# Patient Record
Sex: MALE | Race: WHITE | Employment: OTHER | URBAN - NONMETROPOLITAN AREA
[De-identification: names, ages, dates, MRNs, and addresses within clinical notes are randomized per-mention and may not be internally consistent; named-entity substitution may affect disease eponyms.]

---

## 2017-10-30 ENCOUNTER — TELEPHONE (OUTPATIENT)
Dept: FAMILY MEDICINE | Facility: OTHER | Age: 62
End: 2017-10-30

## 2017-10-30 ENCOUNTER — OFFICE VISIT (OUTPATIENT)
Dept: FAMILY MEDICINE | Facility: OTHER | Age: 62
End: 2017-10-30
Attending: FAMILY MEDICINE
Payer: COMMERCIAL

## 2017-10-30 VITALS
HEART RATE: 72 BPM | WEIGHT: 170 LBS | TEMPERATURE: 96.9 F | BODY MASS INDEX: 26.68 KG/M2 | OXYGEN SATURATION: 98 % | HEIGHT: 67 IN | SYSTOLIC BLOOD PRESSURE: 132 MMHG | DIASTOLIC BLOOD PRESSURE: 80 MMHG

## 2017-10-30 DIAGNOSIS — R73.03 PRE-DIABETES: Primary | ICD-10-CM

## 2017-10-30 DIAGNOSIS — Z13.220 SCREENING FOR LIPOID DISORDERS: Primary | ICD-10-CM

## 2017-10-30 DIAGNOSIS — E78.2 MIXED HYPERLIPIDEMIA: ICD-10-CM

## 2017-10-30 DIAGNOSIS — B19.20 HEPATITIS C VIRUS INFECTION WITHOUT HEPATIC COMA, UNSPECIFIED CHRONICITY: ICD-10-CM

## 2017-10-30 DIAGNOSIS — I10 ESSENTIAL HYPERTENSION, BENIGN: ICD-10-CM

## 2017-10-30 LAB
ANION GAP SERPL CALCULATED.3IONS-SCNC: 4 MMOL/L (ref 3–14)
BUN SERPL-MCNC: 13 MG/DL (ref 7–30)
CALCIUM SERPL-MCNC: 9 MG/DL (ref 8.5–10.1)
CHLORIDE SERPL-SCNC: 100 MMOL/L (ref 94–109)
CHOLEST SERPL-MCNC: 199 MG/DL
CO2 SERPL-SCNC: 30 MMOL/L (ref 20–32)
CREAT SERPL-MCNC: 0.94 MG/DL (ref 0.66–1.25)
GFR SERPL CREATININE-BSD FRML MDRD: 81 ML/MIN/1.7M2
GLUCOSE SERPL-MCNC: 114 MG/DL (ref 70–99)
HDLC SERPL-MCNC: 51 MG/DL
LDLC SERPL CALC-MCNC: 131 MG/DL
NONHDLC SERPL-MCNC: 148 MG/DL
POTASSIUM SERPL-SCNC: 4.5 MMOL/L (ref 3.4–5.3)
SODIUM SERPL-SCNC: 134 MMOL/L (ref 133–144)
TRIGL SERPL-MCNC: 83 MG/DL

## 2017-10-30 PROCEDURE — 36415 COLL VENOUS BLD VENIPUNCTURE: CPT | Performed by: FAMILY MEDICINE

## 2017-10-30 PROCEDURE — 80048 BASIC METABOLIC PNL TOTAL CA: CPT | Performed by: FAMILY MEDICINE

## 2017-10-30 PROCEDURE — 99214 OFFICE O/P EST MOD 30 MIN: CPT | Performed by: FAMILY MEDICINE

## 2017-10-30 PROCEDURE — 80061 LIPID PANEL: CPT | Performed by: FAMILY MEDICINE

## 2017-10-30 RX ORDER — LISINOPRIL 10 MG/1
10 TABLET ORAL DAILY
Qty: 90 TABLET | Refills: 3 | Status: SHIPPED | OUTPATIENT
Start: 2017-10-30 | End: 2018-11-19

## 2017-10-30 RX ORDER — ATORVASTATIN CALCIUM 10 MG/1
10 TABLET, FILM COATED ORAL DAILY
Qty: 90 TABLET | Refills: 1 | Status: SHIPPED | OUTPATIENT
Start: 2017-10-30 | End: 2018-11-29

## 2017-10-30 ASSESSMENT — ANXIETY QUESTIONNAIRES
2. NOT BEING ABLE TO STOP OR CONTROL WORRYING: NOT AT ALL
1. FEELING NERVOUS, ANXIOUS, OR ON EDGE: NOT AT ALL
GAD7 TOTAL SCORE: 0
5. BEING SO RESTLESS THAT IT IS HARD TO SIT STILL: NOT AT ALL
4. TROUBLE RELAXING: NOT AT ALL
6. BECOMING EASILY ANNOYED OR IRRITABLE: NOT AT ALL
7. FEELING AFRAID AS IF SOMETHING AWFUL MIGHT HAPPEN: NOT AT ALL
3. WORRYING TOO MUCH ABOUT DIFFERENT THINGS: NOT AT ALL

## 2017-10-30 ASSESSMENT — PATIENT HEALTH QUESTIONNAIRE - PHQ9: SUM OF ALL RESPONSES TO PHQ QUESTIONS 1-9: 0

## 2017-10-30 ASSESSMENT — PAIN SCALES - GENERAL: PAINLEVEL: NO PAIN (0)

## 2017-10-30 NOTE — PROGRESS NOTES
SUBJECTIVE:   Gamaliel Johnson is a 62 year old male who presents to clinic today for the following health issues:      Hypertension Follow-up      Outpatient blood pressures are not being checked.    Low Salt Diet: low salt  Currently on lisinopril 10 mg daily- needs refill       Amount of exercise or physical activity: 6-7 days/week for an average of 45-60 minutes    Problems taking medications regularly: No    Medication side effects: none    Diet: low salt        Diagnosed with hepatitis C a few years ago.  He never did follow-through to see the gastroenterologist    Problem list and histories reviewed & adjusted, as indicated.  Additional history: as documented    Patient Active Problem List   Diagnosis     Chronic hepatitis C (H)     Hypertension     ACP (advance care planning)     Past Surgical History:   Procedure Laterality Date     COLONOSCOPY  1986     COLONOSCOPY N/A 2/19/2015    Procedure: COLONOSCOPY;  Surgeon: Efren Hernandez MD;  Location: [facility]     VASECTOMY Bilateral        Social History   Substance Use Topics     Smoking status: Never Smoker     Smokeless tobacco: Current User     Types: Chew     Alcohol use Yes      Comment: 4-5 beers max/case a week     Family History   Problem Relation Age of Onset     Hypertension Father      Hypertension Mother          Current Outpatient Prescriptions   Medication Sig Dispense Refill     lisinopril (PRINIVIL/ZESTRIL) 10 MG tablet Take 1 tablet (10 mg) by mouth daily 90 tablet 3     sildenafil (VIAGRA) 100 MG tablet Take 0.5-1 tablets ( mg) by mouth daily as needed for erectile dysfunction Take 30 min to 4 hours before intercourse.  Never use with nitroglycerin, terazosin or doxazosin. 12 tablet 11     [DISCONTINUED] lisinopril (PRINIVIL,ZESTRIL) 10 MG tablet TAKE 1 TABLET DAILY 90 tablet 2     Allergies   Allergen Reactions     Sulfa Drugs          Reviewed and updated as needed this visit by clinical staff     Reviewed and updated as needed this  "visit by Provider         ROS:  Constitutional, HEENT, cardiovascular, pulmonary, gi and gu systems are negative, except as otherwise noted.      OBJECTIVE:   /80  Pulse 72  Temp 96.9  F (36.1  C) (Tympanic)  Ht 5' 6.5\" (1.689 m)  Wt 170 lb (77.1 kg)  SpO2 98%  BMI 27.03 kg/m2  Body mass index is 27.03 kg/(m^2).  GENERAL: healthy, alert and no distress  NECK: no adenopathy, no asymmetry, masses, or scars and thyroid normal to palpation  RESP: lungs clear to auscultation - no rales, rhonchi or wheezes  CV: regular rate and rhythm, normal S1 S2, no S3 or S4, no murmur, click or rub, no peripheral edema and peripheral pulses strong  ABDOMEN: soft, nontender, no hepatosplenomegaly, no masses and bowel sounds normal  MS: no gross musculoskeletal defects noted, no edema    Diagnostic Test Results:pending  Exam Date Exam Time Accession # Results    11/28/14  8:56 AM [id number]    Component Results   Component Value Flag Ref Range Units Status Collected Lab   HCV RNA Quant IU/ml [id number] (H) 0 - 12 IU/mL Final 11/28/2014  8:56 AM 75   Log of HCV RNA Qt 5.4 (H) 0 - 1.1 Log IU/mL Final 11/28/2014  8:56 AM 75   Comment:   Assay methodology is polymerase chain reaction (PCR) using FDA approved Abbott    RealTime HCV test.    The International Unit (IU) is a designated unit value assigned to the    \"International Standard for Nucleic Acid Amplification Technology Assays for    HCV RNA\" which is accepted by the WHO Expert Committee on Biological    Standardization       ASSESSMENT/PLAN:               ICD-10-CM    1. Screening for lipoid disorders Z13.220 Lipid Profile done will call with results   2. Essential hypertension, benign I10 Basic metabolic panel done. BP is controlled     lisinopril (PRINIVIL/ZESTRIL) 10 MG tablet   3. Hepatitis C virus infection without hepatic coma, unspecified chronicity B19.20 GASTROENTEROLOGY ADULT REF CONSULT ONLY. Patient had abnormal hepatitis C lab tests in 2014.  He never " followed through to see the gastroenterologist.  He is now willing to see them.  Will make another referral to be treated for hepatitis C.  He did not want  Current labs on this now.           NEIL Valverde MD  [facility]

## 2017-10-30 NOTE — TELEPHONE ENCOUNTER
Patient notified of lab results, declined diabetic ed, will recheck labs in 3 months and start medication  DANNY JORDAN

## 2017-10-30 NOTE — MR AVS SNAPSHOT
After Visit Summary   10/30/2017    Gamaliel Johnson    MRN: [id number]           Patient Information     Date Of Birth          1955        Visit Information        Provider Department      10/30/2017 10:30 AM NEIL Valverde MD [facility]        Today's Diagnoses     Screening for lipoid disorders    -  1    Essential hypertension, benign        Hepatitis C virus infection without hepatic coma, unspecified chronicity        Essential hypertension with goal blood pressure less than 130/85          Care Instructions    We will call with the labs.            Follow-ups after your visit        Additional Services     GASTROENTEROLOGY ADULT REF CONSULT ONLY       Preferred Location: [facility]      Please be aware that coverage of these services is subject to the terms and limitations of your health insurance plan.  Call member services at your health plan with any benefit or coverage questions.  Any procedures must be performed at a [facility] facility OR coordinated by your clinic's referral office.    Please bring the following with you to your appointment:    (1) Any X-Rays, CTs or MRIs which have been performed.  Contact the facility where they were done to arrange for  prior to your scheduled appointment.    (2) List of current medications   (3) This referral request   (4) Any documents/labs given to you for this referral                  Who to contact     If you have questions or need follow up information about today's clinic visit or your schedule please contact [facility] directly at [phone].  Normal or non-critical lab and imaging results will be communicated to you by MyChart, letter or phone within 4 business days after the clinic has received the results. If you do not hear from us within 7 days, please contact the clinic through MyChart or phone. If you have a critical or abnormal lab result, we will notify you by phone as soon as  "possible.  Submit refill requests through [employer] or call your pharmacy and they will forward the refill request to us. Please allow 3 business days for your refill to be completed.          Additional Information About Your Visit        [employer] Information     The [employer] lets you send messages to your doctor, view your test results, renew your prescriptions, schedule appointments and more. To sign up, go to [email or web address] . Click on \"Log in\" on the left side of the screen, which will take you to the Welcome page. Then click on \"Sign up Now\" on the right side of the page.     You will be asked to enter the access code listed below, as well as some personal information. Please follow the directions to create your username and password.     Your access code is: [email or web address]  Expires: 2018 10:42 AM     Your access code will  in 90 days. If you need help or a new code, please call your [facility] clinic or [phone].        Care EveryWhere ID     This is your Care EveryWhere ID. This could be used by other organizations to access your [facility] medical records  [id number]        Your Vitals Were     Pulse Temperature Height Pulse Oximetry BMI (Body Mass Index)       72 96.9  F (36.1  C) (Tympanic) 5' 6.5\" (1.689 m) 98% 27.03 kg/m2        Blood Pressure from Last 3 Encounters:   10/30/17 132/80   16 130/81   03/16/15 132/80    Weight from Last 3 Encounters:   10/30/17 170 lb (77.1 kg)   16 167 lb (75.8 kg)   03/16/15 170 lb (77.1 kg)              We Performed the Following     Basic metabolic panel     GASTROENTEROLOGY ADULT REF CONSULT ONLY     Lipid Profile          Today's Medication Changes          These changes are accurate as of: 10/30/17 10:42 AM.  If you have any questions, ask your nurse or doctor.               These medicines have changed or have updated prescriptions.        Dose/Directions    lisinopril 10 MG tablet   Commonly known as:  PRINIVIL/ZESTRIL   This may have " changed:  See the new instructions.   Used for:  Essential hypertension with goal blood pressure less than 130/85   Changed by:  NEIL Valverde MD        Dose:  10 mg   Take 1 tablet (10 mg) by mouth daily   Quantity:  90 tablet   Refills:  3            Where to get your medicines      These medications were sent to [facility] - [place], [place] - [address]  [address] [place] [address]     Phone:  [phone]     lisinopril 10 MG tablet                Primary Care Provider    None Specified       No primary provider on file.        Equal Access to Services     [facility]: Hadii aad ku hadasho Soomaali, waaxda luqadaha, qaybta kaalmada adeegyada, waxay audrey fuentes . So [facility] [phone].    ATENCIÓN: Si habla español, tiene a watters disposición servicios gratuitos de asistencia lingüística. Ll[facility] [phone].    We comply with applicable federal civil rights laws and [place] laws. We do not discriminate on the basis of race, color, national origin, age, disability, sex, sexual orientation, or gender identity.            Thank you!     Thank you for choosing [facility]  for your care. Our goal is always to provide you with excellent care. Hearing back from our patients is one way we can continue to improve our services. Please take a few minutes to complete the written survey that you may receive in the mail after your visit with us. Thank you!             Your Updated Medication List - Protect others around you: Learn how to safely use, store and throw away your medicines at www.disposemymeds.org.          This list is accurate as of: 10/30/17 10:42 AM.  Always use your most recent med list.                   Brand Name Dispense Instructions for use Diagnosis    lisinopril 10 MG tablet    PRINIVIL/ZESTRIL    90 tablet    Take 1 tablet (10 mg) by mouth daily    Essential hypertension with goal blood pressure less than 130/85       sildenafil 100 MG  tablet    VIAGRA    12 tablet    Take 0.5-1 tablets ( mg) by mouth daily as needed for erectile dysfunction Take 30 min to 4 hours before intercourse.  Never use with nitroglycerin, terazosin or doxazosin.    Other male erectile dysfunction

## 2017-10-30 NOTE — NURSING NOTE
"Chief Complaint   Patient presents with     [facility]     Last PCP- Venice Antony      Hypertension       Initial BP (!) 148/92 (BP Location: Left arm, Patient Position: Chair, Cuff Size: Adult Large)  Pulse 72  Temp 96.9  F (36.1  C) (Tympanic)  Ht 5' 6.5\" (1.689 m)  Wt 170 lb (77.1 kg)  SpO2 98%  BMI 27.03 kg/m2 Estimated body mass index is 27.03 kg/(m^2) as calculated from the following:    Height as of this encounter: 5' 6.5\" (1.689 m).    Weight as of this encounter: 170 lb (77.1 kg).  Medication Reconciliation: complete     DANNY JORDAN      "

## 2017-10-31 ASSESSMENT — ANXIETY QUESTIONNAIRES: GAD7 TOTAL SCORE: 0

## 2017-12-12 ENCOUNTER — TRANSFERRED RECORDS (OUTPATIENT)
Dept: HEALTH INFORMATION MANAGEMENT | Facility: HOSPITAL | Age: 62
End: 2017-12-12

## 2017-12-20 ENCOUNTER — TRANSFERRED RECORDS (OUTPATIENT)
Dept: HEALTH INFORMATION MANAGEMENT | Facility: HOSPITAL | Age: 62
End: 2017-12-20

## 2018-01-05 ENCOUNTER — TRANSFERRED RECORDS (OUTPATIENT)
Dept: HEALTH INFORMATION MANAGEMENT | Facility: HOSPITAL | Age: 63
End: 2018-01-05

## 2018-01-06 ENCOUNTER — TRANSFERRED RECORDS (OUTPATIENT)
Dept: HEALTH INFORMATION MANAGEMENT | Facility: HOSPITAL | Age: 63
End: 2018-01-06

## 2018-01-10 ENCOUNTER — TRANSFERRED RECORDS (OUTPATIENT)
Dept: HEALTH INFORMATION MANAGEMENT | Facility: HOSPITAL | Age: 63
End: 2018-01-10

## 2018-01-10 LAB — HBA1C MFR BLD: 5.6 % (ref 4–6)

## 2018-01-15 ENCOUNTER — TRANSFERRED RECORDS (OUTPATIENT)
Dept: HEALTH INFORMATION MANAGEMENT | Facility: HOSPITAL | Age: 63
End: 2018-01-15

## 2018-01-18 ENCOUNTER — TRANSFERRED RECORDS (OUTPATIENT)
Dept: HEALTH INFORMATION MANAGEMENT | Facility: HOSPITAL | Age: 63
End: 2018-01-18

## 2018-03-20 ENCOUNTER — TRANSFERRED RECORDS (OUTPATIENT)
Dept: HEALTH INFORMATION MANAGEMENT | Facility: CLINIC | Age: 63
End: 2018-03-20

## 2018-04-16 ENCOUNTER — TRANSFERRED RECORDS (OUTPATIENT)
Dept: HEALTH INFORMATION MANAGEMENT | Facility: CLINIC | Age: 63
End: 2018-04-16

## 2018-05-25 ENCOUNTER — TRANSFERRED RECORDS (OUTPATIENT)
Dept: HEALTH INFORMATION MANAGEMENT | Facility: CLINIC | Age: 63
End: 2018-05-25

## 2018-08-02 ENCOUNTER — TRANSFERRED RECORDS (OUTPATIENT)
Dept: HEALTH INFORMATION MANAGEMENT | Facility: CLINIC | Age: 63
End: 2018-08-02

## 2018-08-09 ENCOUNTER — TRANSFERRED RECORDS (OUTPATIENT)
Dept: HEALTH INFORMATION MANAGEMENT | Facility: CLINIC | Age: 63
End: 2018-08-09

## 2018-11-19 DIAGNOSIS — I10 ESSENTIAL HYPERTENSION, BENIGN: ICD-10-CM

## 2018-11-19 NOTE — TELEPHONE ENCOUNTER
lisinopril (PRINIVIL/ZESTRIL) 10 MG tablet    Last Written Prescription Date:  10/30/17  Last Fill Quantity: 90,   # refills: 3  Last Office Visit: 10/30/17  Future Office visit:    Next 5 appointments (look out 90 days)     Nov 29, 2018  9:00 AM CST   (Arrive by 8:45 AM)   SHORT with NEIL Valverde MD   [facility] ([facility]    [address] [place] [address]   [phone]

## 2018-11-20 RX ORDER — LISINOPRIL 10 MG/1
10 TABLET ORAL DAILY
Qty: 90 TABLET | Refills: 0 | Status: SHIPPED | OUTPATIENT
Start: 2018-11-20 | End: 2019-02-18

## 2018-11-27 NOTE — PROGRESS NOTES
SUBJECTIVE:   Gamaliel Johnson is a 63 year old male who presents to clinic today for the following health issues:      Hyperlipidemia Follow-Up      Rate your low fat/cholesterol diet?: not monitoring fat    Taking statin?  No taking Lipitor     Other lipid medications/supplements?:  none    Hypertension Follow-up      Outpatient blood pressures are not being checked.    Low Salt Diet: low salt      Musculoskeletal problem/pain      Duration: almost 1 year     Description  Location: right hip     Intensity:  mild, moderate    Accompanying signs and symptoms: symptoms occur with physical activity     History  Previous similar problem: no   Previous evaluation:  none    Precipitating or alleviating factors:  Trauma or overuse: no   Aggravating factors include: shoveling     Therapies tried and outcome: rest     WART(S)      Onset: 6 months     Description (location/number): right hand middle finger     Accompanying signs and symptoms: Painful: YES    History: prior warts: YES    Therapies tried and outcome: otc wart removal       [facility]    Gamaliel Johnson, 63 year old, male presents with   Chief Complaint   Patient presents with     Lipids     Hypertension     Musculoskeletal Problem     Derm Problem       PAST MEDICAL HISTORY:  Past Medical History:   Diagnosis Date     Compensated HCV cirrhosis (H)      Hepatitis C        PAST SURGICAL HISTORY:  Past Surgical History:   Procedure Laterality Date     COLONOSCOPY  1986     COLONOSCOPY N/A 2/19/2015    Procedure: COLONOSCOPY;  Surgeon: Efren Hernandez MD;  Location: [facility]     VASECTOMY Bilateral        MEDICATIONS:  Prior to Admission medications    Medication Sig Start Date End Date Taking? Authorizing Provider   lisinopril (PRINIVIL/ZESTRIL) 10 MG tablet Take 1 tablet (10 mg) by mouth daily 11/20/18  Yes NEIL Valvrede MD   sildenafil (VIAGRA) 100 MG tablet Take 0.5-1 tablets ( mg) by mouth daily as needed for erectile dysfunction  Take 30 min to 4 hours before intercourse.  Never use with nitroglycerin, terazosin or doxazosin. 10/28/16  Yes Venice Antony NP       ALLERGIES:     Allergies   Allergen Reactions     Sulfa Drugs        ROS:  Constitutional, neuro, ENT, endocrine, pulmonary, cardiac, gastrointestinal, genitourinary, musculoskeletal, integument and psychiatric systems are negative, except as otherwise noted.      EXAM:  /78  Pulse 82  Temp 97  F (36.1  C) (Tympanic)  Wt 185 lb (83.9 kg)  SpO2 98%  BMI 29.41 kg/m2 Body mass index is 29.41 kg/(m^2).   GENERAL APPEARANCE: healthy, alert and no distress  EYES: Eyes grossly normal to inspection, PERRL and conjunctivae and sclerae normal  NECK: no adenopathy, no asymmetry, masses, or scars and thyroid normal to palpation  RESP: lungs clear to auscultation - no rales, rhonchi or wheezes  CV: regular rates and rhythm, normal S1 S2, no S3 or S4 and no murmur, click or rub  ABDOMEN: soft, nontender, without hepatosplenomegaly or masses and bowel sounds normal  MS: extremities normal- no gross deformities noted, has little tenderness left paraspinal lumbar region  NEURO: Normal strength and tone, mentation intact and speech normal  PSYCH: mentation appears normal and affect normal/bright  Lab/ X-ray  No results found for this or any previous visit (from the past 24 hour(s)).    ASSESSMENT/PLAN:    ICD-10-CM    1. Hypercholesterolemia E78.00 Lipid Profile   2. Essential hypertension, benign I10 Basic metabolic panel   3. Common wart B07.8    4. Chronic hepatitis C without hepatic coma (H) B18.2    5. Back strain, initial encounter S39.012A    We will check fasting lipids for hypercholesterolemia and a BMP for his hypertension blood pressure is controlled on lisinopril.  Has a common wart right hand middle digit PIP joint laterally discussed cryotherapy he consents cryotherapy x2 tolerated the procedure well.  Is followed by gastroenterology for his chronic hepatitis C will be having  quantitative hepatitis C test in a couple weeks.  For his back he notices it when he goes out and shovels.  I told him before he travels he should stretch and warm up because there is a lot of bending twisting and lifting.  He will try these measures first and if that does not help then he will come in for evaluation x-ray and possible formal physical therapy.      FORREST Valverde MD  November 29, 2018

## 2018-11-29 ENCOUNTER — TELEPHONE (OUTPATIENT)
Dept: FAMILY MEDICINE | Facility: OTHER | Age: 63
End: 2018-11-29

## 2018-11-29 ENCOUNTER — OFFICE VISIT (OUTPATIENT)
Dept: FAMILY MEDICINE | Facility: OTHER | Age: 63
End: 2018-11-29
Attending: FAMILY MEDICINE
Payer: COMMERCIAL

## 2018-11-29 VITALS
SYSTOLIC BLOOD PRESSURE: 138 MMHG | WEIGHT: 185 LBS | TEMPERATURE: 97 F | BODY MASS INDEX: 29.41 KG/M2 | DIASTOLIC BLOOD PRESSURE: 78 MMHG | HEART RATE: 82 BPM | OXYGEN SATURATION: 98 %

## 2018-11-29 DIAGNOSIS — S39.012A BACK STRAIN, INITIAL ENCOUNTER: ICD-10-CM

## 2018-11-29 DIAGNOSIS — B18.2 CHRONIC HEPATITIS C WITHOUT HEPATIC COMA (H): ICD-10-CM

## 2018-11-29 DIAGNOSIS — I10 ESSENTIAL HYPERTENSION, BENIGN: ICD-10-CM

## 2018-11-29 DIAGNOSIS — E78.00 HYPERCHOLESTEROLEMIA: Primary | ICD-10-CM

## 2018-11-29 DIAGNOSIS — E78.2 MIXED HYPERLIPIDEMIA: Primary | ICD-10-CM

## 2018-11-29 DIAGNOSIS — B07.8 COMMON WART: ICD-10-CM

## 2018-11-29 LAB
ANION GAP SERPL CALCULATED.3IONS-SCNC: 4 MMOL/L (ref 3–14)
BUN SERPL-MCNC: 19 MG/DL (ref 7–30)
CALCIUM SERPL-MCNC: 8.7 MG/DL (ref 8.5–10.1)
CHLORIDE SERPL-SCNC: 103 MMOL/L (ref 94–109)
CHOLEST SERPL-MCNC: 183 MG/DL
CO2 SERPL-SCNC: 26 MMOL/L (ref 20–32)
CREAT SERPL-MCNC: 1 MG/DL (ref 0.66–1.25)
GFR SERPL CREATININE-BSD FRML MDRD: 75 ML/MIN/1.7M2
GLUCOSE SERPL-MCNC: 106 MG/DL (ref 70–99)
HDLC SERPL-MCNC: 51 MG/DL
LDLC SERPL CALC-MCNC: 117 MG/DL
NONHDLC SERPL-MCNC: 132 MG/DL
POTASSIUM SERPL-SCNC: 4.6 MMOL/L (ref 3.4–5.3)
SODIUM SERPL-SCNC: 133 MMOL/L (ref 133–144)
TRIGL SERPL-MCNC: 76 MG/DL

## 2018-11-29 PROCEDURE — 36415 COLL VENOUS BLD VENIPUNCTURE: CPT | Performed by: FAMILY MEDICINE

## 2018-11-29 PROCEDURE — 80061 LIPID PANEL: CPT | Performed by: FAMILY MEDICINE

## 2018-11-29 PROCEDURE — 17110 DESTRUCTION B9 LES UP TO 14: CPT | Performed by: FAMILY MEDICINE

## 2018-11-29 PROCEDURE — 99212 OFFICE O/P EST SF 10 MIN: CPT | Mod: 25 | Performed by: FAMILY MEDICINE

## 2018-11-29 PROCEDURE — 80048 BASIC METABOLIC PNL TOTAL CA: CPT | Performed by: FAMILY MEDICINE

## 2018-11-29 RX ORDER — ATORVASTATIN CALCIUM 10 MG/1
10 TABLET, FILM COATED ORAL DAILY
Qty: 90 TABLET | Refills: 1 | COMMUNITY
Start: 2018-11-29 | End: 2020-02-03

## 2018-11-29 ASSESSMENT — PAIN SCALES - GENERAL: PAINLEVEL: NO PAIN (0)

## 2018-11-29 NOTE — NURSING NOTE
"Chief Complaint   Patient presents with     Lipids     Hypertension     Musculoskeletal Problem     Derm Problem       Initial BP (!) 150/94 (BP Location: Left arm, Patient Position: Chair, Cuff Size: Adult Large)  Pulse 82  Temp 97  F (36.1  C) (Tympanic)  Wt 185 lb (83.9 kg)  SpO2 98%  BMI 29.41 kg/m2 Estimated body mass index is 29.41 kg/(m^2) as calculated from the following:    Height as of 10/30/17: 5' 6.5\" (1.689 m).    Weight as of this encounter: 185 lb (83.9 kg).  Medication Reconciliation: complete    DANNY JORDAN LPN  "

## 2018-11-29 NOTE — LETTER
[facility]  [address]  [place] [address]  [phone]        Sylvia 3, 2019    Gamaliel Johnson  [address] [place] [address]              Dear Gamaliel Johnson    This is to remind you that your fasting lab is due.    You may call our office at [phone] to schedule an appointment.    Please disregard this notice if you have already had your labs drawn or made an appointment.        Sincerely,        NEIL Valverde MD

## 2018-11-29 NOTE — MR AVS SNAPSHOT
After Visit Summary   11/29/2018    Gamaliel Johnson    MRN: [id number]           Patient Information     Date Of Birth          1955        Visit Information        Provider Department      11/29/2018 9:00 AM NEIL Valverde MD [facility]        Today's Diagnoses     Hypercholesterolemia    -  1    Essential hypertension, benign        Common wart           Follow-ups after your visit        Who to contact     If you have questions or need follow up information about today's clinic visit or your schedule please contact [facility] directly at [phone].  Normal or non-critical lab and imaging results will be communicated to you by MyChart, letter or phone within 4 business days after the clinic has received the results. If you do not hear from us within 7 days, please contact the clinic through MyChart or phone. If you have a critical or abnormal lab result, we will notify you by phone as soon as possible.  Submit refill requests through [employer] or call your pharmacy and they will forward the refill request to us. Please allow 3 business days for your refill to be completed.          Additional Information About Your Visit        Care EveryWhere ID     This is your Care EveryWhere ID. This could be used by other organizations to access your [facility] medical records  [id number]        Your Vitals Were     Pulse Temperature Pulse Oximetry BMI (Body Mass Index)          82 97  F (36.1  C) (Tympanic) 98% 29.41 kg/m2         Blood Pressure from Last 3 Encounters:   11/29/18 138/78   10/30/17 132/80   07/27/16 130/81    Weight from Last 3 Encounters:   11/29/18 185 lb (83.9 kg)   10/30/17 170 lb (77.1 kg)   07/27/16 167 lb (75.8 kg)              We Performed the Following     Basic metabolic panel     Lipid Profile          Today's Medication Changes          These changes are accurate as of 11/29/18  9:12 AM.  If you have any questions, ask your nurse  or doctor.               Stop taking these medicines if you haven't already. Please contact your care team if you have questions.     atorvastatin 10 MG tablet   Commonly known as:  LIPITOR   Stopped by:  NEIL Valverde MD                    Primary Care Provider Office Phone # Fax #    NEIL Valverde [phone] [address] [address]        Equal Access to Services     [facility]: Hadii aad ku hadasho Soomaali, waaxda luqadaha, qaybta kaalmada adeegyada, waxay idiin hayaan adeeg kharash la'aan . So [facility] [phone].    ATENCIÓN: Si habla español, tiene a watters disposición servicios gratuitos de asistencia lingüística. Llame al [phone].    We comply with applicable federal civil rights laws and [place] laws. We do not discriminate on the basis of race, color, national origin, age, disability, sex, sexual orientation, or gender identity.            Thank you!     Thank you for choosing [facility]  for your care. Our goal is always to provide you with excellent care. Hearing back from our patients is one way we can continue to improve our services. Please take a few minutes to complete the written survey that you may receive in the mail after your visit with us. Thank you!             Your Updated Medication List - Protect others around you: Learn how to safely use, store and throw away your medicines at www.disposemymeds.org.          This list is accurate as of 11/29/18  9:12 AM.  Always use your most recent med list.                   Brand Name Dispense Instructions for use Diagnosis    lisinopril 10 MG tablet    PRINIVIL/ZESTRIL    90 tablet    Take 1 tablet (10 mg) by mouth daily    Essential hypertension, benign       sildenafil 100 MG tablet    VIAGRA    12 tablet    Take 0.5-1 tablets ( mg) by mouth daily as needed for erectile dysfunction Take 30 min to 4 hours before intercourse.  Never use with nitroglycerin, terazosin or  doxazosin.    Other male erectile dysfunction

## 2018-12-03 ENCOUNTER — TRANSFERRED RECORDS (OUTPATIENT)
Dept: HEALTH INFORMATION MANAGEMENT | Facility: HOSPITAL | Age: 63
End: 2018-12-03
Payer: COMMERCIAL

## 2018-12-03 LAB — HEP C HIM: NORMAL

## 2018-12-06 ENCOUNTER — TRANSFERRED RECORDS (OUTPATIENT)
Dept: HEALTH INFORMATION MANAGEMENT | Facility: CLINIC | Age: 63
End: 2018-12-06

## 2019-02-18 DIAGNOSIS — I10 ESSENTIAL HYPERTENSION, BENIGN: ICD-10-CM

## 2019-02-18 RX ORDER — LISINOPRIL 10 MG/1
TABLET ORAL
Qty: 90 TABLET | Refills: 1 | Status: SHIPPED | OUTPATIENT
Start: 2019-02-18 | End: 2019-10-02

## 2019-10-02 DIAGNOSIS — I10 ESSENTIAL HYPERTENSION, BENIGN: ICD-10-CM

## 2019-10-02 NOTE — TELEPHONE ENCOUNTER
lisinopril (PRINIVIL/ZESTRIL) 10 MG tablet  Last Written Prescription Date:  2/118/19  Last Fill Quantity: 90,   # refills: 1  Last Office Visit: 11/29/18  Future Office visit:

## 2019-10-03 RX ORDER — LISINOPRIL 10 MG/1
TABLET ORAL
Qty: 90 TABLET | Refills: 0 | Status: SHIPPED | OUTPATIENT
Start: 2019-10-03 | End: 2020-01-27

## 2020-01-27 DIAGNOSIS — I10 ESSENTIAL HYPERTENSION, BENIGN: ICD-10-CM

## 2020-01-27 RX ORDER — LISINOPRIL 10 MG/1
10 TABLET ORAL DAILY
Qty: 30 TABLET | Refills: 0 | Status: SHIPPED | OUTPATIENT
Start: 2020-01-27 | End: 2020-02-03

## 2020-01-29 NOTE — PROGRESS NOTES
Subjective     Gamaliel Johnson is a 64 year old male who presents to clinic today for the following health issues:    HPI   Hyperlipidemia Follow-Up      Are you regularly taking any medication or supplement to lower your cholesterol?   No    Are you having muscle aches or other side effects that you think could be caused by your cholesterol lowering medication?  No    Hypertension Follow-up      Do you check your blood pressure regularly outside of the clinic? No     Are you following a low salt diet? No    Are your blood pressures ever more than 140 on the top number (systolic) OR more   than 90 on the bottom number (diastolic), for example 140/90? Yes      How many servings of fruits and vegetables do you eat daily?  4 or more    On average, how many sweetened beverages do you drink each day (Examples: soda, juice, sweet tea, etc.  Do NOT count diet or artificially sweetened beverages)?   0    How many days per week do you exercise enough to make your heart beat faster? 5    How many minutes a day do you exercise enough to make your heart beat faster? 30 - 60  How many days per week do you miss taking your medication? 1    What makes it hard for you to take your medications?  remembering to take    [facility]    Gamaliel Johnson, 64 year old, male presents with   Chief Complaint   Patient presents with     Lipids     Hypertension       PAST MEDICAL HISTORY:  Past Medical History:   Diagnosis Date     Compensated HCV cirrhosis (H)      Hepatitis C        PAST SURGICAL HISTORY:  Past Surgical History:   Procedure Laterality Date     COLONOSCOPY  1986     COLONOSCOPY N/A 2/19/2015    Procedure: COLONOSCOPY;  Surgeon: Efren Hernandez MD;  Location: [facility]     VASECTOMY Bilateral        MEDICATIONS:  Prior to Admission medications    Medication Sig Start Date End Date Taking? Authorizing Provider   lisinopril (PRINIVIL/ZESTRIL) 10 MG tablet Take 1 tablet (10 mg) by mouth daily 2/3/20  Yes Abram  "NEIL Sargent MD   sildenafil (VIAGRA) 100 MG tablet Take 0.5-1 tablets ( mg) by mouth daily as needed for erectile dysfunction Take 30 min to 4 hours before intercourse.  Never use with nitroglycerin, terazosin or doxazosin. 10/28/16  Yes Venice Antony NP       ALLERGIES:     Allergies   Allergen Reactions     Sulfa Drugs        ROS:  Constitutional, HEENT, cardiovascular, pulmonary, gi and gu systems are negative, except as otherwise noted.      EXAM:  /74   Pulse 73   Temp 97.4  F (36.3  C) (Tympanic)   Ht 1.702 m (5' 7\")   Wt 81.2 kg (179 lb)   SpO2 97%   BMI 28.04 kg/m   Body mass index is 28.04 kg/m .   GENERAL APPEARANCE: healthy, alert and no distress  EYES: Eyes grossly normal to inspection, PERRL and conjunctivae and sclerae normal  NECK: no adenopathy, no asymmetry, masses, or scars and thyroid normal to palpation  RESP: lungs clear to auscultation - no rales, rhonchi or wheezes  CV: regular rates and rhythm, normal S1 S2, no S3 or S4 and no murmur, click or rub  NEURO: Normal strength and tone, mentation intact and speech normal  Lab/ X-ray  pending    ASSESSMENT/PLAN:    ICD-10-CM    1. Hypercholesterolemia E78.00 Lipid Profile   2. Essential hypertension, benign I10 lisinopril (PRINIVIL/ZESTRIL) 10 MG tablet     Basic metabolic panel   3. Screening for prostate cancer Z12.5 Prostate spec antigen screen     He did have a little coffee with little cream but otherwise is fasting we will check lipids follow-up hypercholesterolemia and follow-up hypertension blood pressures controlled lisinopril renewed.  Check a BMP today.  Also is going to check a PSA.  He is up-to-date with colonoscopy.  Previous knee and hip problems seem to be improved with more physical activities.    FORREST Valverde MD  February 3, 2020              "

## 2020-02-03 ENCOUNTER — OFFICE VISIT (OUTPATIENT)
Dept: FAMILY MEDICINE | Facility: OTHER | Age: 65
End: 2020-02-03
Attending: FAMILY MEDICINE
Payer: COMMERCIAL

## 2020-02-03 VITALS
SYSTOLIC BLOOD PRESSURE: 132 MMHG | HEART RATE: 73 BPM | BODY MASS INDEX: 28.09 KG/M2 | WEIGHT: 179 LBS | TEMPERATURE: 97.4 F | DIASTOLIC BLOOD PRESSURE: 74 MMHG | OXYGEN SATURATION: 97 % | HEIGHT: 67 IN

## 2020-02-03 DIAGNOSIS — I10 ESSENTIAL HYPERTENSION, BENIGN: ICD-10-CM

## 2020-02-03 DIAGNOSIS — E78.00 HYPERCHOLESTEROLEMIA: Primary | ICD-10-CM

## 2020-02-03 DIAGNOSIS — Z12.5 SCREENING FOR PROSTATE CANCER: ICD-10-CM

## 2020-02-03 LAB
ANION GAP SERPL CALCULATED.3IONS-SCNC: 5 MMOL/L (ref 3–14)
BUN SERPL-MCNC: 13 MG/DL (ref 7–30)
CALCIUM SERPL-MCNC: 8.8 MG/DL (ref 8.5–10.1)
CHLORIDE SERPL-SCNC: 104 MMOL/L (ref 94–109)
CHOLEST SERPL-MCNC: 176 MG/DL
CO2 SERPL-SCNC: 26 MMOL/L (ref 20–32)
CREAT SERPL-MCNC: 0.98 MG/DL (ref 0.66–1.25)
GFR SERPL CREATININE-BSD FRML MDRD: 81 ML/MIN/{1.73_M2}
GLUCOSE SERPL-MCNC: 108 MG/DL (ref 70–99)
HDLC SERPL-MCNC: 57 MG/DL
LDLC SERPL CALC-MCNC: 105 MG/DL
NONHDLC SERPL-MCNC: 119 MG/DL
POTASSIUM SERPL-SCNC: 4.1 MMOL/L (ref 3.4–5.3)
PSA SERPL-ACNC: 0.39 UG/L (ref 0–4)
SODIUM SERPL-SCNC: 135 MMOL/L (ref 133–144)
TRIGL SERPL-MCNC: 71 MG/DL

## 2020-02-03 PROCEDURE — 99213 OFFICE O/P EST LOW 20 MIN: CPT | Performed by: FAMILY MEDICINE

## 2020-02-03 PROCEDURE — G0103 PSA SCREENING: HCPCS | Mod: ZL | Performed by: FAMILY MEDICINE

## 2020-02-03 PROCEDURE — 80048 BASIC METABOLIC PNL TOTAL CA: CPT | Mod: ZL | Performed by: FAMILY MEDICINE

## 2020-02-03 PROCEDURE — 36415 COLL VENOUS BLD VENIPUNCTURE: CPT | Mod: ZL | Performed by: FAMILY MEDICINE

## 2020-02-03 PROCEDURE — G0463 HOSPITAL OUTPT CLINIC VISIT: HCPCS

## 2020-02-03 PROCEDURE — 80061 LIPID PANEL: CPT | Mod: ZL | Performed by: FAMILY MEDICINE

## 2020-02-03 RX ORDER — LISINOPRIL 10 MG/1
10 TABLET ORAL DAILY
Qty: 90 TABLET | Refills: 3 | Status: SHIPPED | OUTPATIENT
Start: 2020-02-03 | End: 2021-04-09

## 2020-02-03 ASSESSMENT — ANXIETY QUESTIONNAIRES
4. TROUBLE RELAXING: NOT AT ALL
2. NOT BEING ABLE TO STOP OR CONTROL WORRYING: NOT AT ALL
6. BECOMING EASILY ANNOYED OR IRRITABLE: NOT AT ALL
1. FEELING NERVOUS, ANXIOUS, OR ON EDGE: NOT AT ALL
7. FEELING AFRAID AS IF SOMETHING AWFUL MIGHT HAPPEN: NOT AT ALL
IF YOU CHECKED OFF ANY PROBLEMS ON THIS QUESTIONNAIRE, HOW DIFFICULT HAVE THESE PROBLEMS MADE IT FOR YOU TO DO YOUR WORK, TAKE CARE OF THINGS AT HOME, OR GET ALONG WITH OTHER PEOPLE: NOT DIFFICULT AT ALL
5. BEING SO RESTLESS THAT IT IS HARD TO SIT STILL: NOT AT ALL
3. WORRYING TOO MUCH ABOUT DIFFERENT THINGS: NOT AT ALL
GAD7 TOTAL SCORE: 0

## 2020-02-03 ASSESSMENT — PATIENT HEALTH QUESTIONNAIRE - PHQ9: SUM OF ALL RESPONSES TO PHQ QUESTIONS 1-9: 0

## 2020-02-03 ASSESSMENT — MIFFLIN-ST. JEOR: SCORE: 1555.57

## 2020-02-03 ASSESSMENT — PAIN SCALES - GENERAL: PAINLEVEL: NO PAIN (0)

## 2020-02-03 NOTE — NURSING NOTE
"Chief Complaint   Patient presents with     Lipids     Hypertension       Initial /74   Pulse 73   Temp 97.4  F (36.3  C) (Tympanic)   Ht 1.702 m (5' 7\")   Wt 81.2 kg (179 lb)   SpO2 97%   BMI 28.04 kg/m   Estimated body mass index is 28.04 kg/m  as calculated from the following:    Height as of this encounter: 1.702 m (5' 7\").    Weight as of this encounter: 81.2 kg (179 lb).  Medication Reconciliation: complete  Chitra Lehman LPN  "

## 2020-02-03 NOTE — LETTER
February 3, 2020      Gamaliel Johnson  [address] [place] [address]        Dear ,    We are writing to inform you of your test results. -PSA is good     The 10-year ASCVD risk score (Javi GOODSON Jr., et al., 2013) is: 13.2%.  Ten-year heart risk is slightly elevated plus fasting glucose is a little up.  Would recommend restarting Lipitor 10 mg a day to prevent heart attack.  We can check fasting lipids SGOT in 3-month     Values used to calculate the score:       Age: 64 years       Sex: Male       Is Non- : No       Diabetic: No       Tobacco smoker: No       Systolic Blood Pressure: 132 mmHg       Is BP treated: Yes       HDL Cholesterol: 57 mg/dL       Total Cholesterol: 176 mg/dL   Resulted Orders   Lipid Profile   Result Value Ref Range    Cholesterol 176 <200 mg/dL    Triglycerides 71 <150 mg/dL    HDL Cholesterol 57 >39 mg/dL    LDL Cholesterol Calculated 105 (H) <100 mg/dL      Comment:      Above desirable:  100-129 mg/dl  Borderline High:  130-159 mg/dL  High:             160-189 mg/dL  Very high:       >189 mg/dl      Non HDL Cholesterol 119 <130 mg/dL   Basic metabolic panel   Result Value Ref Range    Sodium 135 133 - 144 mmol/L    Potassium 4.1 3.4 - 5.3 mmol/L    Chloride 104 94 - 109 mmol/L    Carbon Dioxide 26 20 - 32 mmol/L    Anion Gap 5 3 - 14 mmol/L    Glucose 108 (H) 70 - 99 mg/dL    Urea Nitrogen 13 7 - 30 mg/dL    Creatinine 0.98 0.66 - 1.25 mg/dL    GFR Estimate 81 >60 mL/min/[1.73_m2]      Comment:      Non  GFR Calc  Starting 12/18/2018, serum creatinine based estimated GFR (eGFR) will be   calculated using the Chronic Kidney Disease Epidemiology Collaboration   (CKD-EPI) equation.      GFR Estimate If Black >90 >60 mL/min/[1.73_m2]      Comment:       GFR Calc  Starting 12/18/2018, serum creatinine based estimated GFR (eGFR) will be   calculated using the Chronic Kidney Disease Epidemiology Collaboration   (CKD-EPI)  equation.      Calcium 8.8 8.5 - 10.1 mg/dL   Prostate spec antigen screen   Result Value Ref Range    PSA 0.39 0 - 4 ug/L      Comment:      Assay Method:  Chemiluminescence using Siemens Vista analyzer       If you have any questions or concerns, please call the clinic at the number listed above.       Sincerely,        NEIL Valverde MD

## 2020-02-04 ASSESSMENT — ANXIETY QUESTIONNAIRES: GAD7 TOTAL SCORE: 0

## 2020-03-16 ENCOUNTER — OFFICE VISIT (OUTPATIENT)
Dept: FAMILY MEDICINE | Facility: OTHER | Age: 65
End: 2020-03-16
Attending: FAMILY MEDICINE
Payer: COMMERCIAL

## 2020-03-16 ENCOUNTER — NURSE TRIAGE (OUTPATIENT)
Dept: FAMILY MEDICINE | Facility: OTHER | Age: 65
End: 2020-03-16

## 2020-03-16 VITALS
TEMPERATURE: 98.4 F | HEIGHT: 67 IN | WEIGHT: 179 LBS | BODY MASS INDEX: 28.09 KG/M2 | HEART RATE: 91 BPM | OXYGEN SATURATION: 97 % | SYSTOLIC BLOOD PRESSURE: 144 MMHG | RESPIRATION RATE: 20 BRPM | DIASTOLIC BLOOD PRESSURE: 92 MMHG

## 2020-03-16 DIAGNOSIS — L30.9 DERMATITIS: Primary | ICD-10-CM

## 2020-03-16 DIAGNOSIS — L03.319 CELLULITIS OF TRUNK, UNSPECIFIED SITE OF TRUNK: ICD-10-CM

## 2020-03-16 PROCEDURE — 99213 OFFICE O/P EST LOW 20 MIN: CPT | Performed by: FAMILY MEDICINE

## 2020-03-16 PROCEDURE — G0463 HOSPITAL OUTPT CLINIC VISIT: HCPCS

## 2020-03-16 RX ORDER — MOMETASONE FUROATE 1 MG/G
CREAM TOPICAL DAILY
Qty: 30 G | Refills: 1 | Status: SHIPPED | OUTPATIENT
Start: 2020-03-16 | End: 2020-04-06

## 2020-03-16 ASSESSMENT — PAIN SCALES - GENERAL: PAINLEVEL: NO PAIN (0)

## 2020-03-16 ASSESSMENT — MIFFLIN-ST. JEOR: SCORE: 1555.57

## 2020-03-16 NOTE — NURSING NOTE
"Chief Complaint   Patient presents with     Derm Problem       Initial BP (!) 144/92   Pulse 91   Temp 98.4  F (36.9  C) (Tympanic)   Resp 20   Ht 1.702 m (5' 7\")   Wt 81.2 kg (179 lb)   SpO2 97%   BMI 28.04 kg/m   Estimated body mass index is 28.04 kg/m  as calculated from the following:    Height as of this encounter: 1.702 m (5' 7\").    Weight as of this encounter: 81.2 kg (179 lb).  Medication Reconciliation: complete  Chitra Lehman LPN  "

## 2020-03-16 NOTE — TELEPHONE ENCOUNTER
Patient called and has had rash for a couple of weeks.he thought it would go away.Started on his hand and now its on torso,arms,back. One area a little itchy. Little, red dots.None on face.Denies difficulty breathing,thick tongue.He has not tried anything for this and states its not an emergency. Gave care advise and scheduled. Advised if s/s worsen go to ED/UC.    Riddhi Wilson RN       Reason for Disposition    Mild widespread rash    Additional Information    Negative: [1] Life-threatening reaction (anaphylaxis) in the past to similar substance (e.g., food, insect bite/sting, chemical, etc.) AND [2] < 2 hours since exposure    Negative: Shock suspected (e.g., cold/pale/clammy skin, too weak to stand, low BP, rapid pulse)    Negative: Difficult to awaken or acting confused (e.g., disoriented, slurred speech)    Negative: [1] Sudden onset of rash (within last 2 hours) AND [2] difficulty with breathing or swallowing    Negative: [1] Purple or blood-colored spots or dots AND [2] fever    Negative: Sounds like a life-threatening emergency to the triager    Negative: [1] Chickenpox suspected AND [2] known exposure to chickenpox in past 3 weeks    Negative: Hives suspected    Negative: Sunburn suspected    Negative: Swimmer's Itch suspected    Negative: Insect bites suspected    Negative: [1] Drug rash suspected AND [2] started taking new medicine within last 2 weeks(Exception: antihistamine, eye drops, ear drops, decongestant or other OTC cough/cold medicines)    Negative: [1] Widespread rash AND [2] bright red, sunburn-like AND [3] current tampon use or nasal packing    Negative: [1] Widespread rash AND [2] bright red, sunburn-like AND [3] wound infection or recent surgery    Negative: [1] Bright red skin AND [2] peels off in sheets    Negative: Stiff neck (unable to touch chin to chest)    Negative: Fever    Negative: Joint pain or swelling    Negative: Rash looks like large or small blisters (i.e., fluid filled  "bubbles or sacs on the skin)    Negative: Patient sounds very sick or weak to the triager    Negative: Face becomes swollen    Negative: Sores in mouth    Negative: [1] Purple or blood-colored rash (spots or dots) AND [2] no fever AND [3] sounds well to triager    Negative: [1] Headache AND [2] no fever    Negative: SEVERE itching (i.e., interferes with sleep, normal activities or school)    Negative: Sore throat    Negative: Ring-like appearance of rash (or ask: does it look like a  \"target\" or \"bulls- eye\")    Negative: Pregnant    Protocols used: RASH OR REDNESS - WIDESPREAD-A-AH      "

## 2020-03-16 NOTE — PROGRESS NOTES
Subjective     Gamaliel Johnson is a 64 year old male who presents to clinic today for the following health issues:    HPI   Rash      Duration: He has had these patches on his back for a few months is tried different treatments including antibiotic and antifungal    Description  Location: back, bilateral arms, lower legs  Itching: mild    Intensity:  mild    Accompanying signs and symptoms: dry skin     History (similar episodes/previous evaluation): None    Precipitating or alleviating factors:  New exposures:  None  Recent travel: no      Therapies tried and outcome: topical antibiotic, cream for athletes foot        PAST MEDICAL HISTORY:  Past Medical History:   Diagnosis Date     Compensated HCV cirrhosis (H)      Hepatitis C        PAST SURGICAL HISTORY:  Past Surgical History:   Procedure Laterality Date     COLONOSCOPY  1986     COLONOSCOPY N/A 2/19/2015    Procedure: COLONOSCOPY;  Surgeon: Efren Hernandez MD;  Location: [facility]     VASECTOMY Bilateral        MEDICATIONS:  Prior to Admission medications    Medication Sig Start Date End Date Taking? Authorizing Provider   amoxicillin-clavulanate (AUGMENTIN) 875-125 MG tablet Take 1 tablet by mouth 2 times daily 3/16/20  Yes NEIL Valverde MD   lisinopril (PRINIVIL/ZESTRIL) 10 MG tablet Take 1 tablet (10 mg) by mouth daily 2/3/20  Yes NEIL Valverde MD   mometasone (ELOCON) 0.1 % external cream Apply topically daily 3/16/20  Yes NEIL Valverde MD   sildenafil (VIAGRA) 100 MG tablet Take 0.5-1 tablets ( mg) by mouth daily as needed for erectile dysfunction Take 30 min to 4 hours before intercourse.  Never use with nitroglycerin, terazosin or doxazosin. 10/28/16  Yes Venice Antony NP       ALLERGIES:     Allergies   Allergen Reactions     Sulfa Drugs        ROS:  Constitutional, HEENT, cardiovascular, pulmonary, gi and gu systems are negative, except as otherwise noted.      EXAM:  BP (!) 144/92   Pulse 91   Temp 98.4  F (36.9  C) (Tympanic)   Resp  "20   Ht 1.702 m (5' 7\")   Wt 81.2 kg (179 lb)   SpO2 97%   BMI 28.04 kg/m   Body mass index is 28.04 kg/m .   GENERAL APPEARANCE: healthy, alert and no distress  RESP: Breathing comfortably  SKIN: On his tricep area right arm has 2 cm diameter patch of count of reddish rough skin skin and has 3 lesions on his back they are all about a centimeter little bigger in diameter have a rough feel to them little bit red and has a couple on his forearm that are small look like little areas of redness where he is been scratching  Lab/ X-ray  No results found for this or any previous visit (from the past 24 hour(s)).    ASSESSMENT/PLAN:    ICD-10-CM    1. Dermatitis  L30.9 mometasone (ELOCON) 0.1 % external cream   2. Cellulitis of trunk, unspecified site of trunk  L03.319 amoxicillin-clavulanate (AUGMENTIN) 875-125 MG tablet   Looks like he has had patches of just dry skin it does not look like psoriasis but he has been scratching at it now it secondarily infected.  It sounds like he tried a antifungal topical and an antibiotic topical to the 3 big lesions on his back and it did not change them.  We will treat with oral antibiotic and topical Elocon.  He will give us an update in a couple weeks if it is not cleared.      FORREST Valverde MD  March 16, 2020                   "

## 2020-04-06 DIAGNOSIS — L03.319 CELLULITIS OF TRUNK, UNSPECIFIED SITE OF TRUNK: ICD-10-CM

## 2020-04-06 DIAGNOSIS — L30.9 DERMATITIS: ICD-10-CM

## 2020-04-06 RX ORDER — MOMETASONE FUROATE 1 MG/G
CREAM TOPICAL DAILY
Qty: 30 G | Refills: 1 | Status: SHIPPED | OUTPATIENT
Start: 2020-04-06

## 2020-04-06 NOTE — TELEPHONE ENCOUNTER
Elocon cream refill request:    Last refill was 3/16/2020 for 30 gm and 1 additional refill..  Patient would like this refilled.  The refill is prepared for you to sign if you wish him to continue.      See additional notes regarding antibiotic refill, should you choose. It is also prepared, should you wish to refill.   Please, delete if you decline.     Thank you, Dr. Sargent.  Deepali Mane RN

## 2020-04-06 NOTE — TELEPHONE ENCOUNTER
mometasone      Last Written Prescription Date:  3/16/2020  Last Fill Quantity: 30 g,   # refills: 1  Last Office Visit: 3/16/2020  Future Office visit:       Routing refill request to provider for review/approval because:    Patient called requesting a refill on the cream for his rash, states it has improved since he was prescribed the Augmentin and cream. States he is about out of the cream though and feels it is helping and would like more of it. He wasn't sure if he would need more antibiotics. States the rash isn't getting any worse, has cleared up quite a bit but it is still there. Ashley A. Lechevalier, LPN on 4/6/2020 at 11:06 AM

## 2020-04-22 ENCOUNTER — OFFICE VISIT (OUTPATIENT)
Dept: FAMILY MEDICINE | Facility: OTHER | Age: 65
End: 2020-04-22
Attending: FAMILY MEDICINE
Payer: COMMERCIAL

## 2020-04-22 VITALS
SYSTOLIC BLOOD PRESSURE: 138 MMHG | HEART RATE: 75 BPM | BODY MASS INDEX: 28.41 KG/M2 | OXYGEN SATURATION: 98 % | HEIGHT: 67 IN | DIASTOLIC BLOOD PRESSURE: 78 MMHG | WEIGHT: 181 LBS | TEMPERATURE: 98 F

## 2020-04-22 DIAGNOSIS — L40.9 PSORIASIS: Primary | ICD-10-CM

## 2020-04-22 PROCEDURE — 99213 OFFICE O/P EST LOW 20 MIN: CPT

## 2020-04-22 PROCEDURE — G0463 HOSPITAL OUTPT CLINIC VISIT: HCPCS

## 2020-04-22 RX ORDER — PREDNISONE 20 MG/1
20 TABLET ORAL 2 TIMES DAILY
Qty: 14 TABLET | Refills: 0 | Status: SHIPPED | OUTPATIENT
Start: 2020-04-22 | End: 2020-04-22

## 2020-04-22 RX ORDER — BETAMETHASONE DIPROPIONATE 0.5 MG/G
CREAM TOPICAL DAILY
Qty: 50 G | Refills: 0 | Status: SHIPPED | OUTPATIENT
Start: 2020-04-22 | End: 2020-04-28

## 2020-04-22 ASSESSMENT — PAIN SCALES - GENERAL: PAINLEVEL: NO PAIN (0)

## 2020-04-22 ASSESSMENT — MIFFLIN-ST. JEOR: SCORE: 1564.64

## 2020-04-22 NOTE — PROGRESS NOTES
"Subjective     Gamaliel Johnson is a 64 year old male who presents to clinic today for the following health issues:    HPI   Rash      Duration: months    Description  Location: arms, legs, back  Itching: moderate    Intensity:  moderate    Accompanying signs and symptoms: None    History (similar episodes/previous evaluation): None    Precipitating or alleviating factors:  New exposures:  None  Recent travel: no      Therapies tried and outcome: antibiotics, antifungal, creams        PAST MEDICAL HISTORY:  Past Medical History:   Diagnosis Date     Compensated HCV cirrhosis (H)      Hepatitis C        PAST SURGICAL HISTORY:  Past Surgical History:   Procedure Laterality Date     COLONOSCOPY  1986     COLONOSCOPY N/A 2/19/2015    Procedure: COLONOSCOPY;  Surgeon: Efren Hernandez MD;  Location: [facility]     VASECTOMY Bilateral        MEDICATIONS:  Prior to Admission medications    Medication Sig Start Date End Date Taking? Authorizing Provider   lisinopril (PRINIVIL/ZESTRIL) 10 MG tablet Take 1 tablet (10 mg) by mouth daily 2/3/20  Yes NEIL Valverde MD   mometasone (ELOCON) 0.1 % external cream Apply topically daily 4/6/20  Yes NEIL Valverde MD   predniSONE (DELTASONE) 20 MG tablet Take 1 tablet (20 mg) by mouth 2 times daily 4/22/20  Yes NEIL Valverde MD   sildenafil (VIAGRA) 100 MG tablet Take 0.5-1 tablets ( mg) by mouth daily as needed for erectile dysfunction Take 30 min to 4 hours before intercourse.  Never use with nitroglycerin, terazosin or doxazosin. 10/28/16  Yes Venice Antony NP       ALLERGIES:     Allergies   Allergen Reactions     Sulfa Drugs        ROS:  Constitutional, HEENT, cardiovascular, pulmonary, gi and gu systems are negative, except as otherwise noted.      EXAM:  /78   Pulse 75   Temp 98  F (36.7  C)   Ht 1.702 m (5' 7\")   Wt 82.1 kg (181 lb)   SpO2 98%   BMI 28.35 kg/m   Body mass index is 28.35 kg/m .   He is alert breathing comfortably in no acute distress the " lesions on his arms seem to be drying up.  On his legs are several slightly raised salmon-colored lesions no vesicles no nodules.  He has a few on his low back but mainly both lower extremities  Lab/ X-ray  No results found for this or any previous visit (from the past 24 hour(s)).    ASSESSMENT/PLAN:    ICD-10-CM    2. Psoriasis  L40.9 augmented betamethasone dipropionate (DIPROLENE-AF) 0.05 % external cream     Generalized oval-shaped slightly raised salmon lesions mostly on his legs a few on his low back there is a history of psoriasis but there is no real scaling.  I did take a picture and send it to Dr. Mayes.  For now we will treat with a prednisone burst and see if that clears he will stop any topical treatment.  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  Would advise a punch biopsy but at this time because a COVID unable will go with his recommendation of betamethasone dipropionate 0.05% patient will apply twice a day for 2 weeks and can give us a call with an update.  FORREST Valverde MD  April 22, 2020

## 2020-04-22 NOTE — NURSING NOTE
"Chief Complaint   Patient presents with     Derm Problem       Initial /78   Pulse 75   Temp 98  F (36.7  C)   Ht 1.702 m (5' 7\")   Wt 82.1 kg (181 lb)   SpO2 98%   BMI 28.35 kg/m   Estimated body mass index is 28.35 kg/m  as calculated from the following:    Height as of this encounter: 1.702 m (5' 7\").    Weight as of this encounter: 82.1 kg (181 lb).  Medication Reconciliation: complete  Calvin Goddard LPN  "

## 2020-04-28 ENCOUNTER — TELEPHONE (OUTPATIENT)
Dept: FAMILY MEDICINE | Facility: OTHER | Age: 65
End: 2020-04-28

## 2020-04-28 DIAGNOSIS — L40.9 PSORIASIS: ICD-10-CM

## 2020-04-28 RX ORDER — BETAMETHASONE DIPROPIONATE 0.5 MG/G
CREAM TOPICAL DAILY
Qty: 50 G | Refills: 1 | Status: SHIPPED | OUTPATIENT
Start: 2020-04-28

## 2020-07-05 ENCOUNTER — APPOINTMENT (OUTPATIENT)
Dept: CT IMAGING | Facility: HOSPITAL | Age: 65
End: 2020-07-05
Attending: EMERGENCY MEDICINE
Payer: COMMERCIAL

## 2020-07-05 ENCOUNTER — HOSPITAL ENCOUNTER (EMERGENCY)
Facility: HOSPITAL | Age: 65
Discharge: ANOTHER HOSPITAL | End: 2020-07-05
Attending: EMERGENCY MEDICINE | Admitting: EMERGENCY MEDICINE
Payer: COMMERCIAL

## 2020-07-05 ENCOUNTER — APPOINTMENT (OUTPATIENT)
Dept: GENERAL RADIOLOGY | Facility: HOSPITAL | Age: 65
End: 2020-07-05
Attending: EMERGENCY MEDICINE
Payer: COMMERCIAL

## 2020-07-05 ENCOUNTER — TRANSFERRED RECORDS (OUTPATIENT)
Dept: HEALTH INFORMATION MANAGEMENT | Facility: CLINIC | Age: 65
End: 2020-07-05

## 2020-07-05 DIAGNOSIS — I62.9 INTRACRANIAL HEMORRHAGE (H): ICD-10-CM
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
## 2020-07-05 LAB
ALBUMIN SERPL-MCNC: 3.8 G/DL (ref 3.4–5)
ALP SERPL-CCNC: 79 U/L (ref 40–150)
ALT SERPL W P-5'-P-CCNC: 46 U/L (ref 0–70)
ANION GAP SERPL CALCULATED.3IONS-SCNC: 12 MMOL/L (ref 3–14)
AST SERPL W P-5'-P-CCNC: 39 U/L (ref 0–45)
BASOPHILS # BLD AUTO: 0 10E9/L (ref 0–0.2)
BASOPHILS NFR BLD AUTO: 0.5 %
BILIRUB SERPL-MCNC: 0.7 MG/DL (ref 0.2–1.3)
BUN SERPL-MCNC: 13 MG/DL (ref 7–30)
CALCIUM SERPL-MCNC: 8.7 MG/DL (ref 8.5–10.1)
CHLORIDE SERPL-SCNC: 104 MMOL/L (ref 94–109)
CO2 SERPL-SCNC: 19 MMOL/L (ref 20–32)
CREAT SERPL-MCNC: 1.02 MG/DL (ref 0.7–1.2)
CREAT SERPL-MCNC: 1.06 MG/DL (ref 0.66–1.25)
DIFFERENTIAL METHOD BLD: ABNORMAL
EOSINOPHIL # BLD AUTO: 0.1 10E9/L (ref 0–0.7)
EOSINOPHIL NFR BLD AUTO: 0.6 %
ERYTHROCYTE [DISTWIDTH] IN BLOOD BY AUTOMATED COUNT: 13.2 % (ref 10–15)
ETHANOL SERPL-MCNC: <0.01 G/DL
GFR SERPL CREATININE-BSD FRML MDRD: 73 ML/MIN/{1.73_M2}
GFR SERPL CREATININE-BSD FRML MDRD: >60 ML/MIN/1.73M*2
GLUCOSE BLDC GLUCOMTR-MCNC: 141 MG/DL (ref 70–99)
GLUCOSE SERPL-MCNC: 121 MG/DL (ref 70–99)
GLUCOSE SERPL-MCNC: 133 MG/DL (ref 70–99)
HCT VFR BLD AUTO: 42.5 % (ref 40–53)
HGB BLD-MCNC: 14.9 G/DL (ref 13.3–17.7)
IMM GRANULOCYTES # BLD: 0.2 10E9/L (ref 0–0.4)
IMM GRANULOCYTES NFR BLD: 2.4 %
INR PPP: 1.11 (ref 0.86–1.14)
INR PPP: 1.2 (ref 0.9–1.1)
LACTATE BLD-SCNC: 6.8 MMOL/L (ref 0.7–2)
LYMPHOCYTES # BLD AUTO: 1.7 10E9/L (ref 0.8–5.3)
LYMPHOCYTES NFR BLD AUTO: 19.9 %
MCH RBC QN AUTO: 33.6 PG (ref 26.5–33)
MCHC RBC AUTO-ENTMCNC: 35.1 G/DL (ref 31.5–36.5)
MCV RBC AUTO: 96 FL (ref 78–100)
MONOCYTES # BLD AUTO: 0.6 10E9/L (ref 0–1.3)
MONOCYTES NFR BLD AUTO: 7.2 %
NEUTROPHILS # BLD AUTO: 5.7 10E9/L (ref 1.6–8.3)
NEUTROPHILS NFR BLD AUTO: 69.4 %
NRBC # BLD AUTO: 0 10*3/UL
NRBC BLD AUTO-RTO: 0 /100
PLATELET # BLD AUTO: 195 10E9/L (ref 150–450)
POTASSIUM SERPL-SCNC: 3.9 MMOL/L (ref 3.4–5.3)
POTASSIUM SERPL-SCNC: 4 MEQ/L (ref 3.4–5.1)
PROT SERPL-MCNC: 7.5 G/DL (ref 6.8–8.8)
RBC # BLD AUTO: 4.43 10E12/L (ref 4.4–5.9)
SODIUM SERPL-SCNC: 135 MMOL/L (ref 133–144)
TROPONIN I SERPL-MCNC: <0.015 UG/L (ref 0–0.04)
WBC # BLD AUTO: 8.3 10E9/L (ref 4–11)

## 2020-07-05 PROCEDURE — 70450 CT HEAD/BRAIN W/O DYE: CPT | Mod: TC

## 2020-07-05 PROCEDURE — 99291 CRITICAL CARE FIRST HOUR: CPT | Mod: 25

## 2020-07-05 PROCEDURE — 99285 EMERGENCY DEPT VISIT HI MDM: CPT | Mod: Z6 | Performed by: EMERGENCY MEDICINE

## 2020-07-05 PROCEDURE — 83605 ASSAY OF LACTIC ACID: CPT | Performed by: EMERGENCY MEDICINE

## 2020-07-05 PROCEDURE — 74177 CT ABD & PELVIS W/CONTRAST: CPT | Mod: TC

## 2020-07-05 PROCEDURE — 36415 COLL VENOUS BLD VENIPUNCTURE: CPT | Performed by: EMERGENCY MEDICINE

## 2020-07-05 PROCEDURE — 93005 ELECTROCARDIOGRAM TRACING: CPT

## 2020-07-05 PROCEDURE — 25500064 ZZH RX 255 OP 636: Performed by: EMERGENCY MEDICINE

## 2020-07-05 PROCEDURE — 40000986 XR ANKLE PORT LT G/E 3 VW: Mod: TC,LT

## 2020-07-05 PROCEDURE — 93010 ELECTROCARDIOGRAM REPORT: CPT | Performed by: INTERNAL MEDICINE

## 2020-07-05 PROCEDURE — 80320 DRUG SCREEN QUANTALCOHOLS: CPT | Performed by: EMERGENCY MEDICINE

## 2020-07-05 PROCEDURE — 25000128 H RX IP 250 OP 636

## 2020-07-05 PROCEDURE — 25800030 ZZH RX IP 258 OP 636: Performed by: EMERGENCY MEDICINE

## 2020-07-05 PROCEDURE — 71045 X-RAY EXAM CHEST 1 VIEW: CPT | Mod: TC

## 2020-07-05 PROCEDURE — 96375 TX/PRO/DX INJ NEW DRUG ADDON: CPT | Mod: XU

## 2020-07-05 PROCEDURE — 72125 CT NECK SPINE W/O DYE: CPT | Mod: TC

## 2020-07-05 PROCEDURE — 85610 PROTHROMBIN TIME: CPT

## 2020-07-05 PROCEDURE — 25000125 ZZHC RX 250

## 2020-07-05 PROCEDURE — G0390 TRAUMA RESPONS W/HOSP CRITI: HCPCS

## 2020-07-05 PROCEDURE — 25000125 ZZHC RX 250: Performed by: EMERGENCY MEDICINE

## 2020-07-05 PROCEDURE — 85025 COMPLETE CBC W/AUTO DIFF WBC: CPT | Performed by: EMERGENCY MEDICINE

## 2020-07-05 PROCEDURE — 00000146 ZZHCL STATISTIC GLUCOSE BY METER IP

## 2020-07-05 PROCEDURE — 80053 COMPREHEN METABOLIC PANEL: CPT | Performed by: EMERGENCY MEDICINE

## 2020-07-05 PROCEDURE — 73600 X-RAY EXAM OF ANKLE: CPT | Mod: TC,LT

## 2020-07-05 PROCEDURE — 84484 ASSAY OF TROPONIN QUANT: CPT | Performed by: EMERGENCY MEDICINE

## 2020-07-05 PROCEDURE — 96374 THER/PROPH/DIAG INJ IV PUSH: CPT | Mod: XU

## 2020-07-05 RX ORDER — KETAMINE HYDROCHLORIDE 10 MG/ML
100 INJECTION, SOLUTION INTRAMUSCULAR; INTRAVENOUS ONCE
Status: COMPLETED | OUTPATIENT
Start: 2020-07-05 | End: 2020-07-05

## 2020-07-05 RX ORDER — FENTANYL CITRATE 50 UG/ML
50 INJECTION, SOLUTION INTRAMUSCULAR; INTRAVENOUS ONCE
Status: COMPLETED | OUTPATIENT
Start: 2020-07-05 | End: 2020-07-05

## 2020-07-05 RX ORDER — FENTANYL CITRATE 50 UG/ML
INJECTION, SOLUTION INTRAMUSCULAR; INTRAVENOUS
Status: COMPLETED
Start: 2020-07-05 | End: 2020-07-05

## 2020-07-05 RX ORDER — KETAMINE HYDROCHLORIDE 10 MG/ML
100 INJECTION, SOLUTION INTRAMUSCULAR; INTRAVENOUS ONCE
Status: DISCONTINUED | OUTPATIENT
Start: 2020-07-05 | End: 2020-07-06 | Stop reason: HOSPADM

## 2020-07-05 RX ORDER — IOPAMIDOL 612 MG/ML
100 INJECTION, SOLUTION INTRAVASCULAR ONCE
Status: COMPLETED | OUTPATIENT
Start: 2020-07-05 | End: 2020-07-05

## 2020-07-05 RX ORDER — KETAMINE HYDROCHLORIDE 10 MG/ML
INJECTION, SOLUTION INTRAMUSCULAR; INTRAVENOUS
Status: COMPLETED
Start: 2020-07-05 | End: 2020-07-05

## 2020-07-05 RX ADMIN — IOPAMIDOL 100 ML: 612 INJECTION, SOLUTION INTRAVENOUS at 18:11

## 2020-07-05 RX ADMIN — FENTANYL CITRATE 50 MCG: 50 INJECTION, SOLUTION INTRAMUSCULAR; INTRAVENOUS at 18:14

## 2020-07-05 RX ADMIN — KETAMINE HYDROCHLORIDE 100 MG: 10 INJECTION INTRAMUSCULAR; INTRAVENOUS at 18:16

## 2020-07-05 RX ADMIN — SODIUM CHLORIDE 3 MG/HR: 9 INJECTION, SOLUTION INTRAVENOUS at 18:09

## 2020-07-05 RX ADMIN — KETAMINE HYDROCHLORIDE 100 MG: 10 INJECTION INTRAMUSCULAR; INTRAVENOUS at 18:12

## 2020-07-05 RX ADMIN — KETAMINE HYDROCHLORIDE 100 MG: 10 INJECTION, SOLUTION INTRAMUSCULAR; INTRAVENOUS at 18:12

## 2020-07-05 NOTE — ED NOTES
Transferred to [facility] with assist of 5 and maintaining c-spine. Patient on [employer] III monitors.

## 2020-07-05 NOTE — ED NOTES
DATE:  7/5/2020   TIME OF RECEIPT FROM LAB:  4967  LAB TEST:  Lactic acid  LAB VALUE:  6.8  RESULTS GIVEN WITH READ-BACK TO (PROVIDER):  Cortes Brannon MD  TIME LAB VALUE REPORTED TO PROVIDER:   4335
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
## 2020-07-05 NOTE — ED NOTES
100mg IVP ketamine given at this time. Pr restless and unable to lay still for imaging PTA. Verbal order from Dr. Brannon

## 2020-07-06 VITALS — DIASTOLIC BLOOD PRESSURE: 110 MMHG | HEART RATE: 86 BPM | SYSTOLIC BLOOD PRESSURE: 173 MMHG | OXYGEN SATURATION: 100 %

## 2020-07-06 LAB
CREAT SERPL-MCNC: 0.87 MG/DL (ref 0.7–1.2)
GFR SERPL CREATININE-BSD FRML MDRD: >60 ML/MIN/1.73M*2
GLUCOSE SERPL-MCNC: 119 MG/DL (ref 70–99)
POTASSIUM SERPL-SCNC: 3.7 MEQ/L (ref 3.4–5.1)
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
## 2020-07-06 NOTE — ED NOTES
"Dr. Brannon at bedside and states he is going to reduce left ankle fx. Dr. Brannon giving verbal orders for medication to TIANNA Brito. Patient continues to have no short term memory and repeatedly asking \"what is going on, where am I, what happened.\" Patient requires constant reassurance and explanation of what happened and where he is. Patient stays calm and in bed with constant reassurance and reminders of what is happening.   "

## 2020-07-06 NOTE — ED NOTES
TIANNA Sow,  from [facility] completing bedside FAST exam with ultrasound at this time.

## 2020-07-06 NOTE — ED NOTES
Portable x-ray at bedside. Dr. Brannon states he wants portable x-rays completed before patient is brought to CT.

## 2020-07-07 ENCOUNTER — TRANSFERRED RECORDS (OUTPATIENT)
Dept: HEALTH INFORMATION MANAGEMENT | Facility: CLINIC | Age: 65
End: 2020-07-07

## 2020-07-08 ENCOUNTER — HOSPITAL ENCOUNTER (EMERGENCY)
Facility: HOSPITAL | Age: 65
Discharge: HOME | End: 2020-07-09
Attending: INTERNAL MEDICINE | Admitting: INTERNAL MEDICINE
Payer: COMMERCIAL

## 2020-07-08 VITALS
HEART RATE: 100 BPM | TEMPERATURE: 98.6 F | SYSTOLIC BLOOD PRESSURE: 166 MMHG | OXYGEN SATURATION: 95 % | RESPIRATION RATE: 20 BRPM | DIASTOLIC BLOOD PRESSURE: 108 MMHG

## 2020-07-08 DIAGNOSIS — Z48.89 ENCOUNTER FOR POST SURGICAL WOUND CHECK: ICD-10-CM

## 2020-07-08 LAB
CREAT SERPL-MCNC: 0.78 MG/DL (ref 0.7–1.2)
GFR SERPL CREATININE-BSD FRML MDRD: >60 ML/MIN/1.73M*2
GLUCOSE SERPL-MCNC: 145 MG/DL (ref 70–99)
POTASSIUM SERPL-SCNC: 4.2 MEQ/L (ref 3.4–5.1)

## 2020-07-08 PROCEDURE — 99282 EMERGENCY DEPT VISIT SF MDM: CPT | Mod: Z6 | Performed by: INTERNAL MEDICINE

## 2020-07-08 PROCEDURE — 99281 EMR DPT VST MAYX REQ PHY/QHP: CPT

## 2020-07-08 RX ORDER — OXYCODONE AND ACETAMINOPHEN 10; 325 MG/1; MG/1
1 TABLET ORAL EVERY 6 HOURS PRN
COMMUNITY
End: 2020-09-11

## 2020-07-08 NOTE — ED AVS SNAPSHOT
[facility]  [address]  [place] [address]  Phone:  [phone]                                    Gamaliel Johnson   MRN: [id number]    Department:  [facility]   Date of Visit:  7/8/2020           After Visit Summary Signature Page    I have received my discharge instructions, and my questions have been answered. I have discussed any challenges I see with this plan with the nurse or doctor.    ..........................................................................................................................................  Patient/Patient Representative Signature      ..........................................................................................................................................  Patient Representative Print Name and Relationship to Patient    ..................................................               ................................................  Date                                   Time    ..........................................................................................................................................  Reviewed by Signature/Title    ...................................................              ..............................................  Date                                               Time          22EPIC Rev 08/18

## 2020-07-09 ASSESSMENT — ENCOUNTER SYMPTOMS
DIZZINESS: 0
ANAL BLEEDING: 0
NUMBNESS: 0
COLOR CHANGE: 0
WEAKNESS: 0
WHEEZING: 0
MYALGIAS: 0
PALPITATIONS: 0
HEADACHES: 0
VOICE CHANGE: 0
ABDOMINAL PAIN: 0
CHEST TIGHTNESS: 0
FLANK PAIN: 0
BLOOD IN STOOL: 0
FEVER: 0
DYSURIA: 0
SLEEP DISTURBANCE: 0
NECK PAIN: 0
SHORTNESS OF BREATH: 0
LIGHT-HEADEDNESS: 0
CONFUSION: 0
DIAPHORESIS: 0
COUGH: 0
FREQUENCY: 0
NAUSEA: 0
VOMITING: 0
BACK PAIN: 0
CHILLS: 0
ABDOMINAL DISTENTION: 0

## 2020-07-09 NOTE — ED NOTES
Patient states that he was released from [facility] today after having surgery on bilateral ankles/feet. States he was walking today using his walker and tonight noticed drainage from bilateral ankles. Did remove bandages and reapplied new dressings. States they then called [facility] with symptoms and were advised to be seen.  Dressings are removed and provider is in room.  Has a couple drops of blood from right site. Left site had 2 dressings and outside has drainage on dressing and inside dressing with no drainage.

## 2020-07-09 NOTE — ED NOTES
Discharge instructions gone over with patient and he states understanding. Patient is then discharged in stable condition, ambulatory with walker, with significant other.

## 2020-07-10 NOTE — ED PROVIDER NOTES
History     Chief Complaint   Patient presents with     Wound Check     The history is provided by the patient.     Gamaliel Johnson is a 64 year old male who came for checking surgical wound on both ankle, he discharged from [facility] today and and dressing soaked in bloody/ yellowish discharge.     Allergies:  Allergies   Allergen Reactions     Sulfa Drugs        Problem List:    Patient Active Problem List    Diagnosis Date Noted     ACP (advance care planning) 07/27/2016     Priority: Medium     Advance Care Planning 7/27/2016: ACP Review of Chart / Resources Provided:  Reviewed chart for advance care plan.  Gamaliel Johnson has no plan or code status on file. Discussed available resources and provided with information. Confirmed code status reflects current choices pending further ACP discussions.  Confirmed/documented legally designated decision makers.  Added by Shima Hanson             Hypertension 03/16/2015     Priority: Medium     Chronic hepatitis C without hepatic coma (H) 12/02/2014     Priority: Medium     Overview:   SVR at end of Epclusa therapy   Liver biopsy done /unknown date          Past Medical History:    Past Medical History:   Diagnosis Date     Compensated HCV cirrhosis (H)      Hepatitis C        Past Surgical History:    Past Surgical History:   Procedure Laterality Date     COLONOSCOPY  1986     COLONOSCOPY N/A 2/19/2015    Procedure: COLONOSCOPY;  Surgeon: Efren Hernandez MD;  Location: [facility]     VASECTOMY Bilateral        Family History:    Family History   Problem Relation Age of Onset     Hypertension Father      Hypertension Mother        Social History:  Marital Status:  Single [1]  Social History     Tobacco Use     Smoking status: Never Smoker     Smokeless tobacco: Current User     Types: Chew   Substance Use Topics     Alcohol use: Yes     Comment: 4-5 beers max/case a week     Drug use: Yes     Comment: pot        Medications:    augmented betamethasone  dipropionate (DIPROLENE-AF) 0.05 % external cream  lisinopril (PRINIVIL/ZESTRIL) 10 MG tablet  oxyCODONE-acetaminophen (PERCOCET)  MG per tablet  mometasone (ELOCON) 0.1 % external cream  sildenafil (VIAGRA) 100 MG tablet          Review of Systems   Constitutional: Negative for chills, diaphoresis and fever.   HENT: Negative for voice change.    Eyes: Negative for visual disturbance.   Respiratory: Negative for cough, chest tightness, shortness of breath and wheezing.    Cardiovascular: Negative for chest pain, palpitations and leg swelling.   Gastrointestinal: Negative for abdominal distention, abdominal pain, anal bleeding, blood in stool, nausea and vomiting.   Genitourinary: Negative for decreased urine volume, dysuria, flank pain and frequency.   Musculoskeletal: Negative for back pain, gait problem, myalgias and neck pain.   Skin: Negative for color change, pallor and rash.   Neurological: Negative for dizziness, syncope, weakness, light-headedness, numbness and headaches.   Psychiatric/Behavioral: Negative for confusion, sleep disturbance and suicidal ideas.   All other systems reviewed and are negative.      Physical Exam   BP: (!) 166/108  Pulse: 100  Temp: 98.6  F (37  C)  Resp: 20  SpO2: 95 %      Physical Exam  Vitals signs and nursing note reviewed.   Constitutional:       Appearance: He is well-developed.   HENT:      Head: Normocephalic and atraumatic.      Mouth/Throat:      Pharynx: No oropharyngeal exudate.   Eyes:      Conjunctiva/sclera: Conjunctivae normal.      Pupils: Pupils are equal, round, and reactive to light.   Neck:      Musculoskeletal: Normal range of motion and neck supple.      Thyroid: No thyromegaly.      Vascular: No JVD.      Trachea: No tracheal deviation.   Cardiovascular:      Rate and Rhythm: Normal rate and regular rhythm.      Heart sounds: Normal heart sounds. No murmur. No friction rub. No gallop.    Pulmonary:      Effort: Pulmonary effort is normal. No  respiratory distress.      Breath sounds: Normal breath sounds. No stridor. No wheezing or rales.   Chest:      Chest wall: No tenderness.   Abdominal:      General: Bowel sounds are normal. There is no distension.      Palpations: Abdomen is soft. There is no mass.      Tenderness: There is no abdominal tenderness. There is no guarding or rebound.   Musculoskeletal: Normal range of motion.         General: No tenderness.      Comments: Surgical wound on ankles looks clean ,no bleeding. Slight serosanguinous oozing around sutures was noticed.    Lymphadenopathy:      Cervical: No cervical adenopathy.   Skin:     General: Skin is warm and dry.      Coloration: Skin is not pale.      Findings: No erythema or rash.   Neurological:      Mental Status: He is alert and oriented to person, place, and time.   Psychiatric:         Behavior: Behavior normal.         ED Course        Procedures             No results found for this or any previous visit (from the past 24 hour(s)).    Medications - No data to display    Assessments & Plan (with Medical Decision Making)   Normal serosanguinous discharge from surgical wound of ankle, no bleeding  Dressing replaced, pt observed in ER , no bleeding, dressing remain dry  D C home  I have reviewed the nursing notes.    I have reviewed the findings, diagnosis, plan and need for follow up with the patient.      Discharge Medication List as of 7/9/2020 12:42 AM          Final diagnoses:   Encounter for post surgical wound check       7/8/2020   [facility]     Tobias Jackson MD  07/09/20 2058
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
## 2020-07-20 ENCOUNTER — OFFICE VISIT (OUTPATIENT)
Dept: FAMILY MEDICINE | Facility: OTHER | Age: 65
End: 2020-07-20
Attending: FAMILY MEDICINE
Payer: COMMERCIAL

## 2020-07-20 VITALS
TEMPERATURE: 97.1 F | DIASTOLIC BLOOD PRESSURE: 74 MMHG | OXYGEN SATURATION: 99 % | HEART RATE: 89 BPM | SYSTOLIC BLOOD PRESSURE: 132 MMHG

## 2020-07-20 DIAGNOSIS — S82.891S OPEN RIGHT ANKLE FRACTURE, SEQUELA: ICD-10-CM

## 2020-07-20 DIAGNOSIS — S93.05XS ANKLE DISLOCATION, LEFT, SEQUELA: ICD-10-CM

## 2020-07-20 DIAGNOSIS — S32.010S COMPRESSION FRACTURE OF L1 VERTEBRA, SEQUELA: ICD-10-CM

## 2020-07-20 DIAGNOSIS — S06.6X9S SUBARACHNOID HEMORRHAGE FOLLOWING INJURY, WITH LOSS OF CONSCIOUSNESS, SEQUELA (H): ICD-10-CM

## 2020-07-20 DIAGNOSIS — Z87.828 HISTORY OF MOTOR VEHICLE ACCIDENT: Primary | ICD-10-CM

## 2020-07-20 PROBLEM — S06.6X1A: Status: ACTIVE | Noted: 2020-07-06

## 2020-07-20 PROBLEM — S32.020A CLOSED COMPRESSION FRACTURE OF L2 LUMBAR VERTEBRA, INITIAL ENCOUNTER (H): Status: ACTIVE | Noted: 2020-07-06
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
## 2020-07-20 PROCEDURE — 99214 OFFICE O/P EST MOD 30 MIN: CPT | Performed by: FAMILY MEDICINE

## 2020-07-20 ASSESSMENT — PAIN SCALES - GENERAL: PAINLEVEL: NO PAIN (0)

## 2020-07-31 ENCOUNTER — TELEPHONE (OUTPATIENT)
Dept: FAMILY MEDICINE | Facility: OTHER | Age: 65
End: 2020-07-31

## 2020-07-31 NOTE — TELEPHONE ENCOUNTER
Please call daughter back regarding FMLA forms, brought forms in around 7/23/20. Already sent to boss, but needs FMLA forms faxed to [phone] today.

## 2020-08-06 ENCOUNTER — TELEPHONE (OUTPATIENT)
Dept: FAMILY MEDICINE | Facility: OTHER | Age: 65
End: 2020-08-06
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
## 2020-08-07 ENCOUNTER — HOSPITAL ENCOUNTER (EMERGENCY)
Facility: HOSPITAL | Age: 65
Discharge: HOME | End: 2020-08-07
Attending: EMERGENCY MEDICINE | Admitting: EMERGENCY MEDICINE
Payer: COMMERCIAL

## 2020-08-07 VITALS
OXYGEN SATURATION: 99 % | DIASTOLIC BLOOD PRESSURE: 101 MMHG | SYSTOLIC BLOOD PRESSURE: 145 MMHG | TEMPERATURE: 97 F | RESPIRATION RATE: 16 BRPM

## 2020-08-07 DIAGNOSIS — T81.49XA POSTOPERATIVE WOUND INFECTION: Primary | ICD-10-CM

## 2020-08-07 LAB
GRAM STN SPEC: ABNORMAL
GRAM STN SPEC: ABNORMAL
SPECIMEN SOURCE: ABNORMAL

## 2020-08-07 PROCEDURE — 87077 CULTURE AEROBIC IDENTIFY: CPT | Performed by: EMERGENCY MEDICINE

## 2020-08-07 PROCEDURE — 87205 SMEAR GRAM STAIN: CPT | Performed by: EMERGENCY MEDICINE

## 2020-08-07 PROCEDURE — 99283 EMERGENCY DEPT VISIT LOW MDM: CPT

## 2020-08-07 PROCEDURE — 87186 SC STD MICRODIL/AGAR DIL: CPT | Performed by: EMERGENCY MEDICINE

## 2020-08-07 PROCEDURE — 87205 SMEAR GRAM STAIN: CPT

## 2020-08-07 PROCEDURE — 87070 CULTURE OTHR SPECIMN AEROBIC: CPT | Performed by: EMERGENCY MEDICINE

## 2020-08-07 PROCEDURE — 99283 EMERGENCY DEPT VISIT LOW MDM: CPT | Mod: Z6 | Performed by: EMERGENCY MEDICINE

## 2020-08-07 RX ORDER — CLINDAMYCIN HCL 300 MG
300 CAPSULE ORAL 3 TIMES DAILY
Qty: 30 CAPSULE | Refills: 0 | Status: SHIPPED | OUTPATIENT
Start: 2020-08-07 | End: 2020-08-17

## 2020-08-07 ASSESSMENT — ENCOUNTER SYMPTOMS
ABDOMINAL PAIN: 0
FEVER: 0
SHORTNESS OF BREATH: 0
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
## 2020-08-07 NOTE — ED NOTES
Condition stable, understands discharge instructions, prescription e-scribed to Pharmacy of choice, discharged home.

## 2020-08-07 NOTE — ED AVS SNAPSHOT
[facility]  [address]  [place] [address]  Phone:  [phone]                                    Gamaliel Johnson   MRN: [id number]    Department:  [facility]   Date of Visit:  8/7/2020           After Visit Summary Signature Page    I have received my discharge instructions, and my questions have been answered. I have discussed any challenges I see with this plan with the nurse or doctor.    ..........................................................................................................................................  Patient/Patient Representative Signature      ..........................................................................................................................................  Patient Representative Print Name and Relationship to Patient    ..................................................               ................................................  Date                                   Time    ..........................................................................................................................................  Reviewed by Signature/Title    ...................................................              ..............................................  Date                                               Time          22EPIC Rev 08/18
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
## 2020-08-09 LAB
BACTERIA SPEC CULT: ABNORMAL
Lab: ABNORMAL
SPECIMEN SOURCE: ABNORMAL

## 2020-09-10 ENCOUNTER — TELEPHONE (OUTPATIENT)
Dept: FAMILY MEDICINE | Facility: OTHER | Age: 65
End: 2020-09-10
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
## 2020-09-11 ENCOUNTER — OFFICE VISIT (OUTPATIENT)
Dept: FAMILY MEDICINE | Facility: OTHER | Age: 65
End: 2020-09-11
Attending: FAMILY MEDICINE
Payer: COMMERCIAL

## 2020-09-11 VITALS
SYSTOLIC BLOOD PRESSURE: 134 MMHG | HEART RATE: 82 BPM | BODY MASS INDEX: 25.76 KG/M2 | WEIGHT: 170 LBS | OXYGEN SATURATION: 98 % | HEIGHT: 68 IN | DIASTOLIC BLOOD PRESSURE: 82 MMHG

## 2020-09-11 DIAGNOSIS — L08.9 FOOT INFECTION: Primary | ICD-10-CM

## 2020-09-11 PROCEDURE — G0463 HOSPITAL OUTPT CLINIC VISIT: HCPCS

## 2020-09-11 PROCEDURE — 99213 OFFICE O/P EST LOW 20 MIN: CPT | Performed by: FAMILY MEDICINE

## 2020-09-11 RX ORDER — CLINDAMYCIN HCL 300 MG
300 CAPSULE ORAL 3 TIMES DAILY
Qty: 30 CAPSULE | Refills: 0 | Status: SHIPPED | OUTPATIENT
Start: 2020-09-11 | End: 2020-10-13

## 2020-09-11 ASSESSMENT — PAIN SCALES - GENERAL: PAINLEVEL: NO PAIN (0)

## 2020-09-11 ASSESSMENT — MIFFLIN-ST. JEOR: SCORE: 1522.67
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
## 2020-09-11 NOTE — NURSING NOTE
"Chief Complaint   Patient presents with     Wound Check       Initial /82   Pulse 82   Ht 1.715 m (5' 7.5\")   Wt 77.1 kg (170 lb)   SpO2 98%   BMI 26.23 kg/m   Estimated body mass index is 26.23 kg/m  as calculated from the following:    Height as of this encounter: 1.715 m (5' 7.5\").    Weight as of this encounter: 77.1 kg (170 lb).  Medication Reconciliation: complete  Chitra Lehman LPN  "

## 2020-10-30 ENCOUNTER — TELEPHONE (OUTPATIENT)
Dept: FAMILY MEDICINE | Facility: OTHER | Age: 65
End: 2020-10-30

## 2020-10-30 NOTE — TELEPHONE ENCOUNTER
11:30 AM    Reason for Call: Phone Call    Description: pt would like to be worked in for a recheck on his ankle/ please call    Was an appointment offered for this call? No  If yes : Appointment type              Date    Preferred method for responding to this message: Telephone Call  What is your phone number ? [phone]    If we cannot reach you directly, may we leave a detailed response at the number you provided? Yes    Can this message wait until your PCP/provider returns, if available today? YES, No, provider is in    Mari Chaudhry

## 2020-11-02 ENCOUNTER — OFFICE VISIT (OUTPATIENT)
Dept: FAMILY MEDICINE | Facility: OTHER | Age: 65
End: 2020-11-02
Attending: FAMILY MEDICINE
Payer: COMMERCIAL

## 2020-11-02 VITALS
BODY MASS INDEX: 28.08 KG/M2 | HEART RATE: 81 BPM | WEIGHT: 182 LBS | DIASTOLIC BLOOD PRESSURE: 92 MMHG | SYSTOLIC BLOOD PRESSURE: 162 MMHG | OXYGEN SATURATION: 99 %

## 2020-11-02 DIAGNOSIS — Z98.890 STATUS POST ORIF OF FRACTURE OF ANKLE: Primary | ICD-10-CM

## 2020-11-02 DIAGNOSIS — Z87.81 STATUS POST ORIF OF FRACTURE OF ANKLE: Primary | ICD-10-CM

## 2020-11-02 PROBLEM — S51.852A DOG BITE OF LEFT FOREARM: Status: ACTIVE | Noted: 2020-07-06
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
## 2020-11-02 PROBLEM — R79.89 ELEVATED LIVER FUNCTION TESTS: Status: ACTIVE | Noted: 2018-01-06

## 2020-11-02 PROBLEM — S93.05XA: Status: ACTIVE | Noted: 2020-07-05

## 2020-11-02 PROCEDURE — G0463 HOSPITAL OUTPT CLINIC VISIT: HCPCS

## 2020-11-02 PROCEDURE — 99213 OFFICE O/P EST LOW 20 MIN: CPT | Performed by: FAMILY MEDICINE

## 2020-11-02 PROCEDURE — G0463 HOSPITAL OUTPT CLINIC VISIT: HCPCS | Mod: 25

## 2020-11-02 ASSESSMENT — PAIN SCALES - GENERAL: PAINLEVEL: NO PAIN (0)
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
## 2020-11-02 NOTE — NURSING NOTE
"Chief Complaint   Patient presents with     Wound Check       Initial BP (!) 162/92 (Patient Position: Sitting)   Pulse 81   Wt 82.6 kg (182 lb)   SpO2 99%   BMI 28.08 kg/m   Estimated body mass index is 28.08 kg/m  as calculated from the following:    Height as of 9/11/20: 1.715 m (5' 7.5\").    Weight as of this encounter: 82.6 kg (182 lb).  Medication Reconciliation: complete  Adriana Smith LPN  "

## 2020-11-16 ENCOUNTER — OFFICE VISIT (OUTPATIENT)
Dept: FAMILY MEDICINE | Facility: OTHER | Age: 65
End: 2020-11-16
Attending: FAMILY MEDICINE
Payer: COMMERCIAL

## 2020-11-16 ENCOUNTER — NURSE TRIAGE (OUTPATIENT)
Dept: FAMILY MEDICINE | Facility: OTHER | Age: 65
End: 2020-11-16

## 2020-11-16 VITALS
BODY MASS INDEX: 26.83 KG/M2 | HEIGHT: 68 IN | DIASTOLIC BLOOD PRESSURE: 90 MMHG | WEIGHT: 177 LBS | HEART RATE: 88 BPM | TEMPERATURE: 98.2 F | OXYGEN SATURATION: 98 % | SYSTOLIC BLOOD PRESSURE: 154 MMHG

## 2020-11-16 DIAGNOSIS — S91.001D WOUND OF RIGHT ANKLE, SUBSEQUENT ENCOUNTER: Primary | ICD-10-CM

## 2020-11-16 PROCEDURE — 87077 CULTURE AEROBIC IDENTIFY: CPT | Mod: ZL | Performed by: FAMILY MEDICINE

## 2020-11-16 PROCEDURE — 87186 SC STD MICRODIL/AGAR DIL: CPT | Mod: ZL | Performed by: FAMILY MEDICINE

## 2020-11-16 PROCEDURE — 99213 OFFICE O/P EST LOW 20 MIN: CPT | Performed by: FAMILY MEDICINE

## 2020-11-16 PROCEDURE — G0463 HOSPITAL OUTPT CLINIC VISIT: HCPCS | Mod: 25

## 2020-11-16 PROCEDURE — G0463 HOSPITAL OUTPT CLINIC VISIT: HCPCS

## 2020-11-16 PROCEDURE — 87070 CULTURE OTHR SPECIMN AEROBIC: CPT | Mod: ZL | Performed by: FAMILY MEDICINE

## 2020-11-16 ASSESSMENT — PAIN SCALES - GENERAL: PAINLEVEL: NO PAIN (0)

## 2020-11-16 ASSESSMENT — MIFFLIN-ST. JEOR: SCORE: 1549.43

## 2020-11-16 NOTE — TELEPHONE ENCOUNTER
"Pt called triage reports \"I had  Surgery on my right ankle\" Pt reports size of wound is smaller than a pencil eraser, blisters was present \"but I popped it\" pt reports it drained. Denies fever, reports mild discomfort occasionally. Pt encouraged not to pop a blister in the future. Pt wanting PCP to take a look pt had recent wound ck with Dr. Vera on 11/2/20. Pt reports his wound rubs on his shoes pt encouraged to rest and elevate leg.     Next 5 appointments (look out 90 days)    Nov 16, 2020  4:30 PM  (Arrive by 4:15 PM)  SHORT with NEIL Valverde MD  [facility] [place] ([facility] [place] ) [address]  [place] [address]  [phone]        Pt scheduled for today with pcp.   .     "
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
## 2020-11-16 NOTE — NURSING NOTE
"Chief Complaint   Patient presents with     Wound Check       Initial BP (!) 154/90 (BP Location: Right arm, Patient Position: Sitting, Cuff Size: Adult Regular)   Pulse 88   Temp 98.2  F (36.8  C) (Tympanic)   Ht 1.715 m (5' 7.5\")   Wt 80.3 kg (177 lb)   SpO2 98%   BMI 27.31 kg/m   Estimated body mass index is 27.31 kg/m  as calculated from the following:    Height as of this encounter: 1.715 m (5' 7.5\").    Weight as of this encounter: 80.3 kg (177 lb).  Medication Reconciliation: complete  Sabi Sadler LPN  "

## 2020-11-19 DIAGNOSIS — L08.9 FOOT INFECTION: Primary | ICD-10-CM

## 2020-11-19 LAB
BACTERIA SPEC CULT: ABNORMAL
SPECIMEN SOURCE: ABNORMAL

## 2020-11-19 RX ORDER — DOXYCYCLINE 100 MG/1
100 CAPSULE ORAL 2 TIMES DAILY
Qty: 14 CAPSULE | Refills: 0 | Status: SHIPPED | OUTPATIENT
Start: 2020-11-19 | End: 2020-12-04

## 2020-12-04 ENCOUNTER — TELEPHONE (OUTPATIENT)
Dept: FAMILY MEDICINE | Facility: OTHER | Age: 65
End: 2020-12-04

## 2020-12-04 DIAGNOSIS — L08.9 FOOT INFECTION: ICD-10-CM

## 2020-12-04 RX ORDER — DOXYCYCLINE 100 MG/1
100 CAPSULE ORAL 2 TIMES DAILY
Qty: 14 CAPSULE | Refills: 0 | Status: SHIPPED | OUTPATIENT
Start: 2020-12-04 | End: 2020-12-14

## 2020-12-04 NOTE — TELEPHONE ENCOUNTER
Call from pt reporting he has completed the prescribed course of doxycycline hyclate (VIBRAMYCIN) 100 MG capsule (prescribed on 11/19/20 x 7 days).    Pt reports right ankle wound has increased in pain, redness to the area, increase pain to touch and drainage (creamy / white drainage). No fever noted.     Pt inquiring on another course of antibiotic or if he should come in to have ankle evaluated. No openings on Dr. SP Valverde schedule.     Please advise.     Pt may be reached at [phone]

## 2020-12-11 NOTE — PATIENT INSTRUCTIONS

## 2020-12-14 ENCOUNTER — OFFICE VISIT (OUTPATIENT)
Dept: FAMILY MEDICINE | Facility: OTHER | Age: 65
End: 2020-12-14
Attending: FAMILY MEDICINE
Payer: COMMERCIAL

## 2020-12-14 ENCOUNTER — TELEPHONE (OUTPATIENT)
Dept: FAMILY MEDICINE | Facility: OTHER | Age: 65
End: 2020-12-14

## 2020-12-14 VITALS
WEIGHT: 177 LBS | HEART RATE: 82 BPM | TEMPERATURE: 97.2 F | BODY MASS INDEX: 26.83 KG/M2 | OXYGEN SATURATION: 97 % | HEIGHT: 68 IN

## 2020-12-14 DIAGNOSIS — Z01.818 PREOP GENERAL PHYSICAL EXAM: Primary | ICD-10-CM

## 2020-12-14 DIAGNOSIS — I10 ESSENTIAL HYPERTENSION: ICD-10-CM

## 2020-12-14 LAB
BASOPHILS # BLD AUTO: 0 10E9/L (ref 0–0.2)
BASOPHILS NFR BLD AUTO: 0.6 %
DIFFERENTIAL METHOD BLD: NORMAL
EOSINOPHIL # BLD AUTO: 0.2 10E9/L (ref 0–0.7)
EOSINOPHIL NFR BLD AUTO: 3.5 %
ERYTHROCYTE [DISTWIDTH] IN BLOOD BY AUTOMATED COUNT: 14.3 % (ref 10–15)
HCT VFR BLD AUTO: 45.3 % (ref 40–53)
HGB BLD-MCNC: 15.7 G/DL (ref 13.3–17.7)
IMM GRANULOCYTES # BLD: 0 10E9/L (ref 0–0.4)
IMM GRANULOCYTES NFR BLD: 0.6 %
LYMPHOCYTES # BLD AUTO: 1.7 10E9/L (ref 0.8–5.3)
LYMPHOCYTES NFR BLD AUTO: 32.1 %
MCH RBC QN AUTO: 32.1 PG (ref 26.5–33)
MCHC RBC AUTO-ENTMCNC: 34.7 G/DL (ref 31.5–36.5)
MCV RBC AUTO: 93 FL (ref 78–100)
MONOCYTES # BLD AUTO: 0.6 10E9/L (ref 0–1.3)
MONOCYTES NFR BLD AUTO: 11.1 %
NEUTROPHILS # BLD AUTO: 2.7 10E9/L (ref 1.6–8.3)
NEUTROPHILS NFR BLD AUTO: 52.1 %
NRBC # BLD AUTO: 0 10*3/UL
NRBC BLD AUTO-RTO: 0 /100
PLATELET # BLD AUTO: 202 10E9/L (ref 150–450)
RBC # BLD AUTO: 4.89 10E12/L (ref 4.4–5.9)
WBC # BLD AUTO: 5.1 10E9/L (ref 4–11)

## 2020-12-14 PROCEDURE — 99214 OFFICE O/P EST MOD 30 MIN: CPT | Performed by: FAMILY MEDICINE

## 2020-12-14 PROCEDURE — 85025 COMPLETE CBC W/AUTO DIFF WBC: CPT | Mod: ZL | Performed by: FAMILY MEDICINE

## 2020-12-14 PROCEDURE — 93005 ELECTROCARDIOGRAM TRACING: CPT

## 2020-12-14 PROCEDURE — 93010 ELECTROCARDIOGRAM REPORT: CPT | Performed by: INTERNAL MEDICINE

## 2020-12-14 PROCEDURE — G0463 HOSPITAL OUTPT CLINIC VISIT: HCPCS | Mod: 25

## 2020-12-14 PROCEDURE — 36415 COLL VENOUS BLD VENIPUNCTURE: CPT | Mod: ZL | Performed by: FAMILY MEDICINE

## 2020-12-14 RX ORDER — CLINDAMYCIN HCL 300 MG
CAPSULE ORAL
COMMUNITY
Start: 2020-12-08 | End: 2021-04-08

## 2020-12-14 ASSESSMENT — MIFFLIN-ST. JEOR: SCORE: 1549.43

## 2020-12-14 ASSESSMENT — PAIN SCALES - GENERAL: PAINLEVEL: NO PAIN (0)

## 2020-12-14 NOTE — NURSING NOTE
"Chief Complaint   Patient presents with     Pre-Op Exam       Initial Pulse 82   Temp 97.2  F (36.2  C) (Tympanic)   Ht 1.715 m (5' 7.5\")   Wt 80.3 kg (177 lb)   SpO2 97%   BMI 27.31 kg/m   Estimated body mass index is 27.31 kg/m  as calculated from the following:    Height as of this encounter: 1.715 m (5' 7.5\").    Weight as of this encounter: 80.3 kg (177 lb).  Medication Reconciliation: complete  Sabi Sadler LPN  "

## 2020-12-15 ENCOUNTER — TELEPHONE (OUTPATIENT)
Dept: FAMILY MEDICINE | Facility: OTHER | Age: 65
End: 2020-12-15

## 2020-12-16 ENCOUNTER — TELEPHONE (OUTPATIENT)
Dept: FAMILY MEDICINE | Facility: OTHER | Age: 65
End: 2020-12-16

## 2020-12-16 DIAGNOSIS — Z20.822 COVID-19 RULED OUT: Primary | ICD-10-CM

## 2020-12-17 ENCOUNTER — TRANSFERRED RECORDS (OUTPATIENT)
Dept: HEALTH INFORMATION MANAGEMENT | Facility: CLINIC | Age: 65
End: 2020-12-17

## 2021-04-08 ENCOUNTER — HOSPITAL ENCOUNTER (EMERGENCY)
Facility: HOSPITAL | Age: 66
Discharge: HOME | End: 2021-04-08
Attending: PHYSICIAN ASSISTANT | Admitting: PHYSICIAN ASSISTANT
Payer: COMMERCIAL

## 2021-04-08 ENCOUNTER — APPOINTMENT (OUTPATIENT)
Dept: CT IMAGING | Facility: HOSPITAL | Age: 66
End: 2021-04-08
Attending: PHYSICIAN ASSISTANT
Payer: COMMERCIAL

## 2021-04-08 VITALS
RESPIRATION RATE: 18 BRPM | TEMPERATURE: 98.2 F | OXYGEN SATURATION: 97 % | HEART RATE: 66 BPM | SYSTOLIC BLOOD PRESSURE: 159 MMHG | DIASTOLIC BLOOD PRESSURE: 106 MMHG

## 2021-04-08 DIAGNOSIS — R41.0 EPISODIC CONFUSION: Primary | ICD-10-CM

## 2021-04-08 DIAGNOSIS — K14.0 TONGUE ULCER: ICD-10-CM

## 2021-04-08 LAB
ALBUMIN SERPL-MCNC: 3.9 G/DL (ref 3.4–5)
ALP SERPL-CCNC: 56 U/L (ref 40–150)
ALT SERPL W P-5'-P-CCNC: 38 U/L (ref 0–70)
ANION GAP SERPL CALCULATED.3IONS-SCNC: 8 MMOL/L (ref 3–14)
AST SERPL W P-5'-P-CCNC: 35 U/L (ref 0–45)
BASOPHILS # BLD AUTO: 0 10E9/L (ref 0–0.2)
BASOPHILS NFR BLD AUTO: 0.4 %
BILIRUB SERPL-MCNC: 0.6 MG/DL (ref 0.2–1.3)
BUN SERPL-MCNC: 10 MG/DL (ref 7–30)
CALCIUM SERPL-MCNC: 8.9 MG/DL (ref 8.5–10.1)
CHLORIDE SERPL-SCNC: 101 MMOL/L (ref 94–109)
CO2 SERPL-SCNC: 23 MMOL/L (ref 20–32)
CREAT SERPL-MCNC: 0.92 MG/DL (ref 0.66–1.25)
DIFFERENTIAL METHOD BLD: ABNORMAL
EOSINOPHIL # BLD AUTO: 0.1 10E9/L (ref 0–0.7)
EOSINOPHIL NFR BLD AUTO: 1.1 %
ERYTHROCYTE [DISTWIDTH] IN BLOOD BY AUTOMATED COUNT: 12.8 % (ref 10–15)
GFR SERPL CREATININE-BSD FRML MDRD: 86 ML/MIN/{1.73_M2}
GLUCOSE SERPL-MCNC: 109 MG/DL (ref 70–99)
HCT VFR BLD AUTO: 42.4 % (ref 40–53)
HGB BLD-MCNC: 15.2 G/DL (ref 13.3–17.7)
IMM GRANULOCYTES # BLD: 0 10E9/L (ref 0–0.4)
IMM GRANULOCYTES NFR BLD: 0.3 %
LYMPHOCYTES # BLD AUTO: 1.2 10E9/L (ref 0.8–5.3)
LYMPHOCYTES NFR BLD AUTO: 14.8 %
MAGNESIUM SERPL-MCNC: 2.4 MG/DL (ref 1.6–2.3)
MCH RBC QN AUTO: 33.4 PG (ref 26.5–33)
MCHC RBC AUTO-ENTMCNC: 35.8 G/DL (ref 31.5–36.5)
MCV RBC AUTO: 93 FL (ref 78–100)
MONOCYTES # BLD AUTO: 0.7 10E9/L (ref 0–1.3)
MONOCYTES NFR BLD AUTO: 8.4 %
NEUTROPHILS # BLD AUTO: 5.9 10E9/L (ref 1.6–8.3)
NEUTROPHILS NFR BLD AUTO: 75 %
NRBC # BLD AUTO: 0 10*3/UL
NRBC BLD AUTO-RTO: 0 /100
PLATELET # BLD AUTO: 186 10E9/L (ref 150–450)
POTASSIUM SERPL-SCNC: 3.9 MMOL/L (ref 3.4–5.3)
PROT SERPL-MCNC: 7.9 G/DL (ref 6.8–8.8)
RBC # BLD AUTO: 4.55 10E12/L (ref 4.4–5.9)
SODIUM SERPL-SCNC: 132 MMOL/L (ref 133–144)
TSH SERPL DL<=0.005 MIU/L-ACNC: 1.44 MU/L (ref 0.4–4)
WBC # BLD AUTO: 7.9 10E9/L (ref 4–11)

## 2021-04-08 PROCEDURE — 85025 COMPLETE CBC W/AUTO DIFF WBC: CPT | Performed by: PHYSICIAN ASSISTANT

## 2021-04-08 PROCEDURE — 99284 EMERGENCY DEPT VISIT MOD MDM: CPT | Mod: 25

## 2021-04-08 PROCEDURE — 70450 CT HEAD/BRAIN W/O DYE: CPT

## 2021-04-08 PROCEDURE — 80053 COMPREHEN METABOLIC PANEL: CPT | Performed by: PHYSICIAN ASSISTANT

## 2021-04-08 PROCEDURE — 36415 COLL VENOUS BLD VENIPUNCTURE: CPT | Performed by: PHYSICIAN ASSISTANT

## 2021-04-08 PROCEDURE — 83735 ASSAY OF MAGNESIUM: CPT | Performed by: PHYSICIAN ASSISTANT

## 2021-04-08 PROCEDURE — 84443 ASSAY THYROID STIM HORMONE: CPT | Performed by: PHYSICIAN ASSISTANT

## 2021-04-08 PROCEDURE — 99284 EMERGENCY DEPT VISIT MOD MDM: CPT | Performed by: PHYSICIAN ASSISTANT

## 2021-04-08 RX ORDER — NYSTATIN 100000/ML
500000 SUSPENSION, ORAL (FINAL DOSE FORM) ORAL 4 TIMES DAILY
Qty: 400 ML | Refills: 1 | Status: SHIPPED | OUTPATIENT
Start: 2021-04-08

## 2021-04-08 ASSESSMENT — ENCOUNTER SYMPTOMS
VOMITING: 0
BRUISES/BLEEDS EASILY: 0
NECK PAIN: 0
LIGHT-HEADEDNESS: 0
FATIGUE: 0
SPEECH DIFFICULTY: 0
APPETITE CHANGE: 0
NAUSEA: 0
DIAPHORESIS: 0
DIZZINESS: 0
WEAKNESS: 0
CHILLS: 0
ABDOMINAL PAIN: 0
NUMBNESS: 0
RESPIRATORY NEGATIVE: 1
PHOTOPHOBIA: 0
NECK STIFFNESS: 0
ACTIVITY CHANGE: 0
HEADACHES: 0
PALPITATIONS: 0
BACK PAIN: 0
PSYCHIATRIC NEGATIVE: 1
FEVER: 0

## 2021-04-09 ENCOUNTER — TELEPHONE (OUTPATIENT)
Dept: FAMILY MEDICINE | Facility: OTHER | Age: 66
End: 2021-04-09

## 2021-04-09 ENCOUNTER — OFFICE VISIT (OUTPATIENT)
Dept: FAMILY MEDICINE | Facility: OTHER | Age: 66
End: 2021-04-09
Attending: FAMILY MEDICINE
Payer: COMMERCIAL

## 2021-04-09 VITALS
BODY MASS INDEX: 27.62 KG/M2 | WEIGHT: 179 LBS | HEART RATE: 77 BPM | OXYGEN SATURATION: 98 % | SYSTOLIC BLOOD PRESSURE: 166 MMHG | TEMPERATURE: 98 F | RESPIRATION RATE: 20 BRPM | DIASTOLIC BLOOD PRESSURE: 86 MMHG

## 2021-04-09 DIAGNOSIS — E78.00 HYPERCHOLESTEROLEMIA: ICD-10-CM

## 2021-04-09 DIAGNOSIS — I10 ESSENTIAL HYPERTENSION, BENIGN: ICD-10-CM

## 2021-04-09 DIAGNOSIS — R41.3 MEMORY LOSS: Primary | ICD-10-CM

## 2021-04-09 LAB
ALBUMIN SERPL-MCNC: 4.2 G/DL (ref 3.4–5)
ALP SERPL-CCNC: 61 U/L (ref 40–150)
ALT SERPL W P-5'-P-CCNC: 43 U/L (ref 0–70)
ANION GAP SERPL CALCULATED.3IONS-SCNC: 6 MMOL/L (ref 3–14)
AST SERPL W P-5'-P-CCNC: 42 U/L (ref 0–45)
BILIRUB SERPL-MCNC: 0.7 MG/DL (ref 0.2–1.3)
BUN SERPL-MCNC: 10 MG/DL (ref 7–30)
CALCIUM SERPL-MCNC: 9.3 MG/DL (ref 8.5–10.1)
CHLORIDE SERPL-SCNC: 102 MMOL/L (ref 94–109)
CHOLEST SERPL-MCNC: 175 MG/DL
CO2 SERPL-SCNC: 24 MMOL/L (ref 20–32)
CREAT SERPL-MCNC: 0.89 MG/DL (ref 0.66–1.25)
GFR SERPL CREATININE-BSD FRML MDRD: 89 ML/MIN/{1.73_M2}
GLUCOSE SERPL-MCNC: 97 MG/DL (ref 70–99)
HDLC SERPL-MCNC: 63 MG/DL
LDLC SERPL CALC-MCNC: 96 MG/DL
NONHDLC SERPL-MCNC: 112 MG/DL
POTASSIUM SERPL-SCNC: 4.1 MMOL/L (ref 3.4–5.3)
PROT SERPL-MCNC: 8.3 G/DL (ref 6.8–8.8)
SODIUM SERPL-SCNC: 132 MMOL/L (ref 133–144)
TRIGL SERPL-MCNC: 80 MG/DL

## 2021-04-09 PROCEDURE — 82607 VITAMIN B-12: CPT | Mod: ZL | Performed by: FAMILY MEDICINE

## 2021-04-09 PROCEDURE — 80053 COMPREHEN METABOLIC PANEL: CPT | Mod: ZL | Performed by: FAMILY MEDICINE

## 2021-04-09 PROCEDURE — 36415 COLL VENOUS BLD VENIPUNCTURE: CPT | Mod: ZL | Performed by: FAMILY MEDICINE

## 2021-04-09 PROCEDURE — 80061 LIPID PANEL: CPT | Mod: ZL | Performed by: FAMILY MEDICINE

## 2021-04-09 PROCEDURE — 99214 OFFICE O/P EST MOD 30 MIN: CPT | Performed by: FAMILY MEDICINE

## 2021-04-09 PROCEDURE — G0463 HOSPITAL OUTPT CLINIC VISIT: HCPCS

## 2021-04-09 RX ORDER — LISINOPRIL 10 MG/1
20 TABLET ORAL DAILY
Qty: 60 TABLET | Refills: 5 | Status: SHIPPED | OUTPATIENT
Start: 2021-04-09 | End: 2021-10-12

## 2021-04-09 ASSESSMENT — PAIN SCALES - GENERAL: PAINLEVEL: NO PAIN (0)
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
## 2021-04-09 NOTE — PROGRESS NOTES
"    Assessment & Plan     Memory loss    - Comprehensive metabolic panel (BMP + Alb, Alk Phos, ALT, AST, Total. Bili, TP)  - Vitamin B12    Hypercholesterolemia    - Lipid Profile    Essential hypertension, benign    - lisinopril (ZESTRIL) 10 MG tablet; Take 2 tablets (20 mg) by mouth daily  Blood pressure is up a little today he has no chest pain or shortness of breath he takes 10 mg lisinopril we will have him take 2 tablets a day.  We will also check a lipids and if they are elevated we will start him on a statin.  He does chew and I stressed the importance for him to not chew tobacco.  We will try to move up and get his MRI next Tuesday he is leaving to go to the [facility] and will be back late Sunday night so we will see if we get the MRI Tuesday and I will call him with the results.  Note he did have lipids done in February 2020 and they were good.  We will check a B12 level and a CMP as well as a lipids today.  This weekend he is traveling and he will be with his daughter who is a nurse and if he has any issues he knows he would need to seek medical attention.           Tobacco Cessation:   reports that he has never smoked. His smokeless tobacco use includes chew.              NEIL Valverde MD  [facility] - THANIA Alston is a 65 year old who presents for the following health issues     HPI     ED/UC Followup:    Facility:  [facility]  Date of visit: 4/8/21  Reason for visit: Memory loss  Current Status: \"im doing ok\"  Patient was seen in the ER yesterday after having an episode of confusion.  He had a nonfocal neuro exam and did have a head CT that showed no acute change from previous CT and it was recommended he have a follow-up MRI.  Blood work was negative for any acute pathology and currently now he has no headache and feels fine no vision change or weakness.           Review of Systems   Constitutional, HEENT, cardiovascular, pulmonary, GI, , musculoskeletal, neuro, skin, " endocrine and psych systems are negative, except as otherwise noted.      Objective    BP (!) 166/86   Pulse 77   Temp 98  F (36.7  C)   Resp 20   Wt 81.2 kg (179 lb)   SpO2 98%   BMI 27.62 kg/m    Body mass index is 27.62 kg/m .  Physical Exam   GENERAL: healthy, alert and no distress  EYES: Eyes grossly normal to inspection, PERRL and conjunctivae and sclerae normal  HEENT: He is a little sore in the right side of his tongue he woke up with it.  Had no incontinence with that.  NECK: no adenopathy, no asymmetry, masses, or scars and thyroid normal to palpation  RESP: lungs clear to auscultation - no rales, rhonchi or wheezes  CV: regular rate and rhythm, normal S1 S2, no S3 or S4, no murmur, click or rub, no peripheral edema and peripheral pulses strong  ABDOMEN: soft, nontender, no hepatosplenomegaly, no masses and bowel sounds normal  MS: no gross musculoskeletal defects noted, no edema  NEURO: Normal strength and tone, mentation intact and speech normal, cranial nerves II through XII intact.  Negative drift negative Romberg finger-to-nose intact he is alert and oriented x3 no focal weakness
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
## 2021-04-09 NOTE — TELEPHONE ENCOUNTER
8:09 AM    Reason for Call: OVERBOOK    Daughter Elsa calling for her Dad and he was in the ER last night they would like Gamaliel to be seen today by Dr Arie Valverde for memory loss, seizure and sores on tongue.     The patient is requesting an appointment for today with Dr Arie Valverde.    Was an appointment offered for this call? No  If yes : Appointment type              Date    Preferred method for responding to this message: Telephone Call  What is your phone number ? Elsa [phone]    If we cannot reach you directly, may we leave a detailed response at the number you provided? Yes    Can this message wait until your PCP/provider returns, if unavailable today? No     Saray Ladd

## 2021-04-09 NOTE — NURSING NOTE
"Chief Complaint   Patient presents with     Hospital F/U       Initial BP (!) 166/86   Pulse 77   Temp 98  F (36.7  C)   Resp 20   Wt 81.2 kg (179 lb)   SpO2 98%   BMI 27.62 kg/m   Estimated body mass index is 27.62 kg/m  as calculated from the following:    Height as of 12/14/20: 1.715 m (5' 7.5\").    Weight as of this encounter: 81.2 kg (179 lb).  Medication Reconciliation: mickey Hung  "
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
## 2021-04-10 LAB — VIT B12 SERPL-MCNC: 860 PG/ML (ref 193–986)

## 2021-04-13 ENCOUNTER — HOSPITAL ENCOUNTER (OUTPATIENT)
Dept: MRI IMAGING | Facility: HOSPITAL | Age: 66
Discharge: HOME | End: 2021-04-13
Attending: PHYSICIAN ASSISTANT | Admitting: PHYSICIAN ASSISTANT
Payer: COMMERCIAL

## 2021-04-13 DIAGNOSIS — R41.0 EPISODIC CONFUSION: ICD-10-CM

## 2021-04-13 PROCEDURE — A9585 GADOBUTROL INJECTION: HCPCS | Performed by: RADIOLOGY

## 2021-04-13 PROCEDURE — 70553 MRI BRAIN STEM W/O & W/DYE: CPT

## 2021-04-13 PROCEDURE — 255N000002 HC RX 255 OP 636: Performed by: RADIOLOGY

## 2021-04-13 RX ORDER — GADOBUTROL 604.72 MG/ML
7.5 INJECTION INTRAVENOUS ONCE
Status: COMPLETED | OUTPATIENT
Start: 2021-04-13 | End: 2021-04-13

## 2021-04-13 RX ADMIN — GADOBUTROL 7.5 ML: 604.72 INJECTION INTRAVENOUS at 12:41

## 2021-04-15 ENCOUNTER — TELEPHONE (OUTPATIENT)
Dept: FAMILY MEDICINE | Facility: OTHER | Age: 66
End: 2021-04-15

## 2021-04-15 NOTE — TELEPHONE ENCOUNTER
To: Nurse to Dr. Arie Valverde  Patient would like to speak with nurse regarding MRI and blood test results.  Please call him @ [phone].  Thank you

## 2021-04-15 NOTE — TELEPHONE ENCOUNTER
Spoke with pt, gave MRI results. Verbalized understanding does not want follow up appt at this time.     Previous area on CT that was seen in the parietal area is a venous angioma which is not malignant.  There is also an innocent cyst seen on the right side no worrisome findings on MRI

## 2021-05-12 NOTE — ED NOTES
Portable x-ray at bedside for post reduction x-ray of left ankle.    A Mediterranean Diet is recommended! Some suggestions include continue incorporating 2 or more servings per day of vegetables, fruits, and whole grains. Increase intake of fish and legumes/beans to 2 or more servings per week. Aim to increase intake of healthy fats, such as olive oil and avocados, and have a handful of nuts/seeds most days. Reduce red/processed meat consumption to 2 or fewer times per week

## 2021-10-12 DIAGNOSIS — I10 ESSENTIAL HYPERTENSION, BENIGN: ICD-10-CM

## 2021-10-12 RX ORDER — LISINOPRIL 10 MG/1
TABLET ORAL
Qty: 180 TABLET | Refills: 3 | Status: SHIPPED | OUTPATIENT
Start: 2021-10-12

## 2021-10-12 NOTE — TELEPHONE ENCOUNTER
lisinopril (ZESTRIL) 10 MG tablet  Last Written Prescription Date:  4/9/21  Last Fill Quantity: 60,  # refills: 5   Last office visit: 4/15/2021   Future Office Visit:      Routing refill request to provider for review/approval because:  Blood pressure out of range   BP Readings from Last 3 Encounters:   04/09/21 (!) 166/86   04/08/21 (!) 159/106   11/16/20 (!) 154/90

## 2021-10-21 ENCOUNTER — TELEPHONE (OUTPATIENT)
Dept: FAMILY MEDICINE | Facility: OTHER | Age: 66
End: 2021-10-21

## 2021-10-21 NOTE — TELEPHONE ENCOUNTER
11:59 AM    Reason for Call: Phone Call    Description: Patient called and is requesting a call back regarding a referral to neurology. Please return his call    Was an appointment offered for this call? No  If yes : Appointment type              Date    Preferred method for responding to this message: Telephone Call  What is your phone number ?[phone]    If we cannot reach you directly, may we leave a detailed response at the number you provided? Yes    Can this message wait until your PCP/provider returns, if available today? Not applicable    Mary Carmen Kennedy

## 2021-10-26 ENCOUNTER — OFFICE VISIT (OUTPATIENT)
Dept: FAMILY MEDICINE | Facility: OTHER | Age: 66
End: 2021-10-26
Attending: FAMILY MEDICINE
Payer: COMMERCIAL

## 2021-10-26 VITALS
SYSTOLIC BLOOD PRESSURE: 132 MMHG | BODY MASS INDEX: 26.83 KG/M2 | DIASTOLIC BLOOD PRESSURE: 88 MMHG | WEIGHT: 177 LBS | HEIGHT: 68 IN | OXYGEN SATURATION: 98 % | TEMPERATURE: 97.7 F | RESPIRATION RATE: 16 BRPM | HEART RATE: 74 BPM

## 2021-10-26 DIAGNOSIS — D18.02 VENOUS ANGIOMA OF BRAIN (H): Primary | ICD-10-CM

## 2021-10-26 PROCEDURE — G0463 HOSPITAL OUTPT CLINIC VISIT: HCPCS | Mod: 25

## 2021-10-26 PROCEDURE — 99214 OFFICE O/P EST MOD 30 MIN: CPT | Performed by: FAMILY MEDICINE

## 2021-10-26 ASSESSMENT — PAIN SCALES - GENERAL: PAINLEVEL: NO PAIN (0)

## 2021-10-26 ASSESSMENT — MIFFLIN-ST. JEOR: SCORE: 1544.43
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
## 2021-10-26 NOTE — NURSING NOTE
"Chief Complaint   Patient presents with     Hypertension     Referral       Initial /88 (BP Location: Left arm, Patient Position: Sitting, Cuff Size: Adult Large)   Pulse 74   Temp 97.7  F (36.5  C) (Tympanic)   Resp 16   Ht 1.715 m (5' 7.5\")   Wt 80.3 kg (177 lb)   SpO2 98%   BMI 27.31 kg/m   Estimated body mass index is 27.31 kg/m  as calculated from the following:    Height as of this encounter: 1.715 m (5' 7.5\").    Weight as of this encounter: 80.3 kg (177 lb).  Medication Reconciliation: complete  Sabi Sadler LPN  "

## 2021-11-10 ENCOUNTER — HOSPITAL ENCOUNTER (OUTPATIENT)
Dept: MRI IMAGING | Facility: HOSPITAL | Age: 66
Discharge: HOME | End: 2021-11-10
Attending: FAMILY MEDICINE | Admitting: FAMILY MEDICINE
Payer: COMMERCIAL

## 2021-11-10 DIAGNOSIS — D18.02 VENOUS ANGIOMA OF BRAIN (H): ICD-10-CM

## 2021-11-10 PROCEDURE — 70553 MRI BRAIN STEM W/O & W/DYE: CPT

## 2021-11-10 PROCEDURE — 255N000002 HC RX 255 OP 636: Performed by: RADIOLOGY

## 2021-11-10 PROCEDURE — A9585 GADOBUTROL INJECTION: HCPCS | Performed by: RADIOLOGY

## 2021-11-10 RX ORDER — GADOBUTROL 604.72 MG/ML
7.5 INJECTION INTRAVENOUS ONCE
Status: COMPLETED | OUTPATIENT
Start: 2021-11-10 | End: 2021-11-10

## 2021-11-10 RX ADMIN — GADOBUTROL 7.5 ML: 604.72 INJECTION INTRAVENOUS at 15:06

## 2021-11-11 ENCOUNTER — TELEPHONE (OUTPATIENT)
Dept: FAMILY MEDICINE | Facility: OTHER | Age: 66
End: 2021-11-11
Payer: COMMERCIAL

## 2021-11-11 DIAGNOSIS — R41.3 MEMORY LOSS: Primary | ICD-10-CM

## 2021-11-11 DIAGNOSIS — D18.02 VENOUS ANGIOMA OF BRAIN (H): ICD-10-CM

## 2021-11-11 NOTE — TELEPHONE ENCOUNTER
Patient calling back and notified of the following results. Patient is wanting to see neurology in regards to MRI. Pended MRI order. Patient states he would like to go through [facility] for referral. Patient is wanting a telemedicine/virtual appointment. Please advise, thank you.     NEIL Valverde MD   11/10/2021  4:00 PM CST         Stable no changes from previous MRI

## 2021-11-12 ENCOUNTER — TRANSCRIBE ORDERS (OUTPATIENT)
Dept: OTHER | Age: 66
End: 2021-11-12
Payer: COMMERCIAL

## 2021-11-12 DIAGNOSIS — R41.3 MEMORY LOSS: Primary | ICD-10-CM

## 2021-11-12 DIAGNOSIS — D18.02 VENOUS ANGIOMA OF BRAIN (H): ICD-10-CM

## 2022-01-01 NOTE — TELEPHONE ENCOUNTER
FUTURE VISIT INFORMATION      FUTURE VISIT INFORMATION:    Date: 1/28/2022    Time: 115pm    Location: [facility]  REFERRAL INFORMATION:    Referring provider:  Dr. Valverde    Referring providers clinic:  [facility]    Reason for visit/diagnosis  Memory Loss, Venous Angioma of brain    RECORDS REQUESTED FROM:       Clinic name Comments Records Status Imaging Status   INternal Dr. Valverde-10/26/2021    MR Brain-11/10/2021, 4/13/2021    CT Head-4/8/2021, 7/5/2020 Epic PACS         [facility] Care Everywhere Requested to PACS                        1/1/2022-Request for images faxed to [facility]-MR @ 830am    1/24/2022-Swrve Images now in PACS-MR @ 601am

## 2022-01-28 ENCOUNTER — PRE VISIT (OUTPATIENT)
Dept: NEUROLOGY | Facility: CLINIC | Age: 67
End: 2022-01-28

## 2022-01-28 ENCOUNTER — VIRTUAL VISIT (OUTPATIENT)
Dept: NEUROLOGY | Facility: CLINIC | Age: 67
End: 2022-01-28
Attending: FAMILY MEDICINE
Payer: COMMERCIAL

## 2022-01-28 DIAGNOSIS — D18.02: Primary | ICD-10-CM

## 2022-01-28 DIAGNOSIS — R40.20 LOSS OF CONSCIOUSNESS (H): ICD-10-CM

## 2022-01-28 PROCEDURE — 99205 OFFICE O/P NEW HI 60 MIN: CPT | Mod: 95 | Performed by: PSYCHIATRY & NEUROLOGY
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
## 2022-01-28 NOTE — PROGRESS NOTES
Gamaliel is a 66 year old who is being evaluated via a billable video visit.      How would you like to obtain your AVS? Mail a copy  If the video visit is dropped, the invitation should be resent by: Text to cell phone: [phone]  Will anyone else be joining your video visit? Yes: Elsa. How would they like to receive their invitation? Text to cell phone: [phone]      Video Start Time: 115  Video-Visit Details    Type of service:  Video Visit    Video End Time:2:13 PM    Originating Location (pt. Location): Home    Distant Location (provider location):  [facility] NEUROLOGY CLINIC [place]     Platform used for Video Visit: Kole    Chief Complaint   Patient presents with     Consult      Darrell Dalal
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
## 2022-01-31 ENCOUNTER — LAB (OUTPATIENT)
Dept: LAB | Facility: OTHER | Age: 67
End: 2022-01-31
Payer: COMMERCIAL

## 2022-01-31 DIAGNOSIS — D18.02: ICD-10-CM

## 2022-01-31 DIAGNOSIS — R40.20 LOSS OF CONSCIOUSNESS (H): ICD-10-CM

## 2022-01-31 DIAGNOSIS — R40.20 LOSS OF CONSCIOUSNESS (H): Primary | ICD-10-CM

## 2022-01-31 LAB
ALBUMIN SERPL-MCNC: 3.9 G/DL (ref 3.4–5)
ALP SERPL-CCNC: 66 U/L (ref 40–150)
ALT SERPL W P-5'-P-CCNC: 39 U/L (ref 0–70)
ANION GAP SERPL CALCULATED.3IONS-SCNC: 5 MMOL/L (ref 3–14)
AST SERPL W P-5'-P-CCNC: 31 U/L (ref 0–45)
BASOPHILS # BLD AUTO: 0 10E3/UL (ref 0–0.2)
BASOPHILS NFR BLD AUTO: 1 %
BILIRUB SERPL-MCNC: 0.7 MG/DL (ref 0.2–1.3)
BUN SERPL-MCNC: 8 MG/DL (ref 7–30)
CALCIUM SERPL-MCNC: 9.3 MG/DL (ref 8.5–10.1)
CHLORIDE BLD-SCNC: 105 MMOL/L (ref 94–109)
CO2 SERPL-SCNC: 26 MMOL/L (ref 20–32)
CREAT SERPL-MCNC: 0.93 MG/DL (ref 0.66–1.25)
EOSINOPHIL # BLD AUTO: 0.2 10E3/UL (ref 0–0.7)
EOSINOPHIL NFR BLD AUTO: 3 %
ERYTHROCYTE [DISTWIDTH] IN BLOOD BY AUTOMATED COUNT: 13 % (ref 10–15)
GFR SERPL CREATININE-BSD FRML MDRD: 90 ML/MIN/1.73M2
GLUCOSE BLD-MCNC: 130 MG/DL (ref 70–99)
HCT VFR BLD AUTO: 45.5 % (ref 40–53)
HGB BLD-MCNC: 16 G/DL (ref 13.3–17.7)
IMM GRANULOCYTES # BLD: 0 10E3/UL
IMM GRANULOCYTES NFR BLD: 0 %
LYMPHOCYTES # BLD AUTO: 1.7 10E3/UL (ref 0.8–5.3)
LYMPHOCYTES NFR BLD AUTO: 32 %
MCH RBC QN AUTO: 33.7 PG (ref 26.5–33)
MCHC RBC AUTO-ENTMCNC: 35.2 G/DL (ref 31.5–36.5)
MCV RBC AUTO: 96 FL (ref 78–100)
MONOCYTES # BLD AUTO: 0.7 10E3/UL (ref 0–1.3)
MONOCYTES NFR BLD AUTO: 13 %
NEUTROPHILS # BLD AUTO: 2.8 10E3/UL (ref 1.6–8.3)
NEUTROPHILS NFR BLD AUTO: 51 %
NRBC # BLD AUTO: 0 10E3/UL
NRBC BLD AUTO-RTO: 0 /100
PLATELET # BLD AUTO: 222 10E3/UL (ref 150–450)
POTASSIUM BLD-SCNC: 4.1 MMOL/L (ref 3.4–5.3)
PROT SERPL-MCNC: 7.7 G/DL (ref 6.8–8.8)
RBC # BLD AUTO: 4.75 10E6/UL (ref 4.4–5.9)
SODIUM SERPL-SCNC: 136 MMOL/L (ref 133–144)
WBC # BLD AUTO: 5.4 10E3/UL (ref 4–11)

## 2022-01-31 PROCEDURE — 36415 COLL VENOUS BLD VENIPUNCTURE: CPT | Mod: ZL

## 2022-01-31 PROCEDURE — 85025 COMPLETE CBC W/AUTO DIFF WBC: CPT | Mod: ZL

## 2022-01-31 PROCEDURE — 84155 ASSAY OF PROTEIN SERUM: CPT | Mod: ZL

## 2022-01-31 PROCEDURE — 84425 ASSAY OF VITAMIN B-1: CPT | Mod: ZL

## 2022-02-01 ENCOUNTER — CARE COORDINATION (OUTPATIENT)
Dept: NEUROLOGY | Facility: CLINIC | Age: 67
End: 2022-02-01
Payer: COMMERCIAL

## 2022-02-02 ENCOUNTER — CARE COORDINATION (OUTPATIENT)
Dept: NEUROLOGY | Facility: CLINIC | Age: 67
End: 2022-02-02
Payer: COMMERCIAL

## 2022-02-04 LAB — VIT B1 PYROPHOSHATE BLD-SCNC: 116 NMOL/L

## 2022-02-08 ENCOUNTER — HOSPITAL ENCOUNTER (OUTPATIENT)
Dept: MRI IMAGING | Facility: HOSPITAL | Age: 67
Discharge: HOME | End: 2022-02-08
Attending: PSYCHIATRY & NEUROLOGY | Admitting: PSYCHIATRY & NEUROLOGY
Payer: COMMERCIAL

## 2022-02-08 DIAGNOSIS — D18.02: ICD-10-CM

## 2022-02-08 DIAGNOSIS — R40.20 LOSS OF CONSCIOUSNESS (H): ICD-10-CM

## 2022-02-08 PROCEDURE — 70544 MR ANGIOGRAPHY HEAD W/O DYE: CPT

## 2022-02-09 NOTE — PROGRESS NOTES
"  Assessment & Plan     Seizure-like activity (H)  Patient with several seizure-like episodes  MRI and MRA unremarkable  Was seen by neurology via virtual visit and advised to quit drinking EtOH as that was thought to be the seizure precipitant  He does have an EEG scheduled for 06/30/2022  I discussed medications (Vivitrol and Campral) however patient refused saying that he has the willpower to quit  Neurological exam essentially unremarkable      Essential hypertension  Well controlled on current regimen, continue without changes    Alcohol dependence with other alcohol-induced disorder (H)  Patient with significant EtOH but without hospitalizations, DTs  Has been able to abstain the past  Discussed medications to help curb EtOH appetite, declined       Tobacco Cessation:   reports that he has never smoked. His smokeless tobacco use includes chew.  Tobacco Cessation Action Plan: Self help information given to patient    BMI:   Estimated body mass index is 27.44 kg/m  as calculated from the following:    Height as of this encounter: 1.676 m (5' 6\").    Weight as of this encounter: 77.1 kg (170 lb).       No follow-ups on file.    Kerri Caballero MD  [facility] - THANIA Alston is a 66 year old who presents for the following health issues     HPI     Patient presenting to clinic to establish care.  Admits he has been drinking too much and working on quitting.  Had about 5 beers during course of day yesterday, prior to this he has not had a drink in about a week or so.  He has not tried any medication to curb his desire for alcohol and feels like he doesn't need to take any.  He has EEG scheduled for end of June and wants to know if it can be done sooner.  His last seizure like episode occurred on 1/27/2022.  He does have an extensive history of drinking.  With 24 beers lasting 2 days for him.  He denies any significant withdrawal symptoms and has never been hospitalized for withdrawal " "seizures or DTs.       Review of Systems   Constitutional, HEENT, cardiovascular, pulmonary, gi and gu systems are negative, except as otherwise noted.      Objective    /78   Pulse 66   Ht 1.676 m (5' 6\")   Wt 77.1 kg (170 lb)   BMI 27.44 kg/m    Body mass index is 27.44 kg/m .  Physical Exam   GENERAL: healthy, alert and no distress  EYES: Eyes grossly normal to inspection, PERRL and conjunctivae and sclerae normal  HENT: ear canals and TM's normal, nose and mouth without ulcers or lesions  NECK: no adenopathy, no asymmetry, masses, or scars and thyroid normal to palpation  RESP: lungs clear to auscultation - no rales, rhonchi or wheezes  CV: regular rate and rhythm, normal S1 S2, no S3 or S4, no murmur, click or rub, no peripheral edema and peripheral pulses strong  ABDOMEN: soft, nontender, no hepatosplenomegaly, no masses and bowel sounds normal  MS: no gross musculoskeletal defects noted, no edema  SKIN: no suspicious lesions or rashes  NEURO: Normal strength and tone, mentation intact and speech normal  PSYCH: mentation appears normal, affect normal/bright    "

## 2022-02-14 ENCOUNTER — OFFICE VISIT (OUTPATIENT)
Dept: FAMILY MEDICINE | Facility: OTHER | Age: 67
End: 2022-02-14
Attending: STUDENT IN AN ORGANIZED HEALTH CARE EDUCATION/TRAINING PROGRAM
Payer: COMMERCIAL

## 2022-02-14 VITALS
BODY MASS INDEX: 27.32 KG/M2 | WEIGHT: 170 LBS | HEIGHT: 66 IN | SYSTOLIC BLOOD PRESSURE: 126 MMHG | HEART RATE: 66 BPM | DIASTOLIC BLOOD PRESSURE: 78 MMHG

## 2022-02-14 DIAGNOSIS — R56.9 SEIZURE-LIKE ACTIVITY (H): ICD-10-CM

## 2022-02-14 DIAGNOSIS — F10.288 ALCOHOL DEPENDENCE WITH OTHER ALCOHOL-INDUCED DISORDER (H): ICD-10-CM

## 2022-02-14 DIAGNOSIS — I10 ESSENTIAL HYPERTENSION: Primary | ICD-10-CM

## 2022-02-14 PROCEDURE — G0463 HOSPITAL OUTPT CLINIC VISIT: HCPCS

## 2022-02-14 PROCEDURE — 99213 OFFICE O/P EST LOW 20 MIN: CPT | Performed by: STUDENT IN AN ORGANIZED HEALTH CARE EDUCATION/TRAINING PROGRAM

## 2022-02-14 ASSESSMENT — ANXIETY QUESTIONNAIRES
2. NOT BEING ABLE TO STOP OR CONTROL WORRYING: NOT AT ALL
3. WORRYING TOO MUCH ABOUT DIFFERENT THINGS: NOT AT ALL
6. BECOMING EASILY ANNOYED OR IRRITABLE: NOT AT ALL
4. TROUBLE RELAXING: NOT AT ALL
5. BEING SO RESTLESS THAT IT IS HARD TO SIT STILL: NOT AT ALL
GAD7 TOTAL SCORE: 0
1. FEELING NERVOUS, ANXIOUS, OR ON EDGE: NOT AT ALL
7. FEELING AFRAID AS IF SOMETHING AWFUL MIGHT HAPPEN: NOT AT ALL

## 2022-02-14 ASSESSMENT — MIFFLIN-ST. JEOR: SCORE: 1488.86

## 2022-02-14 ASSESSMENT — PATIENT HEALTH QUESTIONNAIRE - PHQ9: SUM OF ALL RESPONSES TO PHQ QUESTIONS 1-9: 0

## 2022-02-14 ASSESSMENT — PAIN SCALES - GENERAL: PAINLEVEL: NO PAIN (0)

## 2022-02-14 NOTE — NURSING NOTE
"Chief Complaint   Patient presents with     Establish Care       Initial BP (!) 148/80   Pulse 66   Ht 1.676 m (5' 6\")   Wt 77.1 kg (170 lb)   BMI 27.44 kg/m   Estimated body mass index is 27.44 kg/m  as calculated from the following:    Height as of this encounter: 1.676 m (5' 6\").    Weight as of this encounter: 77.1 kg (170 lb).  Medication Reconciliation: complete  Jessa Behrman, LPN  "

## 2022-02-15 ASSESSMENT — ANXIETY QUESTIONNAIRES: GAD7 TOTAL SCORE: 0

## 2022-03-19 ENCOUNTER — HEALTH MAINTENANCE LETTER (OUTPATIENT)
Age: 67
End: 2022-03-19

## 2022-09-04 ENCOUNTER — HEALTH MAINTENANCE LETTER (OUTPATIENT)
Age: 67
End: 2022-09-04

## 2023-04-29 ENCOUNTER — HEALTH MAINTENANCE LETTER (OUTPATIENT)
Age: 68
End: 2023-04-29

## 2024-07-06 ENCOUNTER — HEALTH MAINTENANCE LETTER (OUTPATIENT)
Age: 69
End: 2024-07-06

## 2024-10-14 NOTE — TELEPHONE ENCOUNTER
I spoke to patient by phone for follow-up of his leg rash.  He states it is significantly better with the Diprolene.  I told him I will send in a refill and once were done with the Covid pandemic he will get back in and we would set him up to see a dermatologist for follow-up. We are treating presumed psoriasis.  I told him if it was not getting better we would have to do a punch biopsy to rule out other diseases but it sounds per patient's report over the phone that the Diprolene cream is definitely clearing up the rash and he is happy about that.   No

## 2025-01-01 ENCOUNTER — RESULTS FOLLOW-UP (OUTPATIENT)
Dept: SURGERY | Facility: CLINIC | Age: 70
End: 2025-01-01

## 2025-01-01 ENCOUNTER — RESULTS FOLLOW-UP (OUTPATIENT)
Dept: FAMILY MEDICINE | Facility: CLINIC | Age: 70
End: 2025-01-01

## 2025-01-01 ENCOUNTER — APPOINTMENT (OUTPATIENT)
Dept: CT IMAGING | Facility: CLINIC | Age: 70
DRG: 981 | End: 2025-01-01
Attending: PHYSICIAN ASSISTANT
Payer: COMMERCIAL

## 2025-01-01 ENCOUNTER — APPOINTMENT (OUTPATIENT)
Dept: CARDIOLOGY | Facility: CLINIC | Age: 70
DRG: 981 | End: 2025-01-01
Attending: PHYSICIAN ASSISTANT
Payer: COMMERCIAL

## 2025-01-01 ENCOUNTER — APPOINTMENT (OUTPATIENT)
Dept: CT IMAGING | Facility: CLINIC | Age: 70
DRG: 981 | End: 2025-01-01
Attending: NURSE PRACTITIONER
Payer: COMMERCIAL

## 2025-01-01 ENCOUNTER — APPOINTMENT (OUTPATIENT)
Dept: CT IMAGING | Facility: HOSPITAL | Age: 70
End: 2025-01-01
Attending: EMERGENCY MEDICINE
Payer: COMMERCIAL

## 2025-01-01 ENCOUNTER — ANESTHESIA EVENT (OUTPATIENT)
Dept: SURGERY | Facility: CLINIC | Age: 70
DRG: 981 | End: 2025-01-01
Payer: COMMERCIAL

## 2025-01-01 ENCOUNTER — APPOINTMENT (OUTPATIENT)
Dept: GENERAL RADIOLOGY | Facility: CLINIC | Age: 70
DRG: 981 | End: 2025-01-01
Attending: SURGERY
Payer: COMMERCIAL

## 2025-01-01 ENCOUNTER — ANESTHESIA (OUTPATIENT)
Dept: INTENSIVE CARE | Facility: CLINIC | Age: 70
DRG: 981 | End: 2025-01-01
Payer: COMMERCIAL

## 2025-01-01 ENCOUNTER — APPOINTMENT (OUTPATIENT)
Dept: GENERAL RADIOLOGY | Facility: CLINIC | Age: 70
DRG: 981 | End: 2025-01-01
Attending: NURSE PRACTITIONER
Payer: COMMERCIAL

## 2025-01-01 ENCOUNTER — APPOINTMENT (OUTPATIENT)
Dept: GENERAL RADIOLOGY | Facility: CLINIC | Age: 70
DRG: 981 | End: 2025-01-01
Payer: COMMERCIAL

## 2025-01-01 ENCOUNTER — APPOINTMENT (OUTPATIENT)
Dept: GENERAL RADIOLOGY | Facility: CLINIC | Age: 70
DRG: 981 | End: 2025-01-01
Attending: INTERNAL MEDICINE
Payer: COMMERCIAL

## 2025-01-01 ENCOUNTER — ANESTHESIA (OUTPATIENT)
Dept: SURGERY | Facility: CLINIC | Age: 70
DRG: 981 | End: 2025-01-01
Payer: COMMERCIAL

## 2025-01-01 ENCOUNTER — ANESTHESIA EVENT (OUTPATIENT)
Dept: INTENSIVE CARE | Facility: CLINIC | Age: 70
DRG: 981 | End: 2025-01-01
Payer: COMMERCIAL

## 2025-01-01 PROCEDURE — 258N000003 HC RX IP 258 OP 636: Performed by: NURSE ANESTHETIST, CERTIFIED REGISTERED

## 2025-01-01 PROCEDURE — 999N000181 XR SURGERY CARM FLUORO GREATER THAN 5 MIN W STILLS

## 2025-01-01 PROCEDURE — 370N000003 HC ANESTHESIA WARD SERVICE: Performed by: ANESTHESIOLOGY

## 2025-01-01 PROCEDURE — 71045 X-RAY EXAM CHEST 1 VIEW: CPT

## 2025-01-01 PROCEDURE — 71260 CT THORAX DX C+: CPT

## 2025-01-01 PROCEDURE — 999N000208 ECHOCARDIOGRAM COMPLETE

## 2025-01-01 PROCEDURE — 250N000009 HC RX 250: Performed by: ANESTHESIOLOGY

## 2025-01-01 PROCEDURE — 71045 X-RAY EXAM CHEST 1 VIEW: CPT | Mod: 26 | Performed by: RADIOLOGY

## 2025-01-01 PROCEDURE — 250N000009 HC RX 250

## 2025-01-01 PROCEDURE — 250N000011 HC RX IP 250 OP 636: Mod: JZ | Performed by: NURSE ANESTHETIST, CERTIFIED REGISTERED

## 2025-01-01 PROCEDURE — 74018 RADEX ABDOMEN 1 VIEW: CPT | Mod: 26 | Performed by: RADIOLOGY

## 2025-01-01 PROCEDURE — 74174 CTA ABD&PLVS W/CONTRAST: CPT | Mod: 26 | Performed by: STUDENT IN AN ORGANIZED HEALTH CARE EDUCATION/TRAINING PROGRAM

## 2025-01-01 PROCEDURE — 250N000011 HC RX IP 250 OP 636: Performed by: NURSE ANESTHETIST, CERTIFIED REGISTERED

## 2025-01-01 PROCEDURE — 250N000009 HC RX 250: Performed by: NURSE ANESTHETIST, CERTIFIED REGISTERED

## 2025-01-01 PROCEDURE — P9045 ALBUMIN (HUMAN), 5%, 250 ML: HCPCS | Performed by: NURSE ANESTHETIST, CERTIFIED REGISTERED

## 2025-01-01 PROCEDURE — 258N000003 HC RX IP 258 OP 636: Performed by: PHYSICIAN ASSISTANT

## 2025-01-01 PROCEDURE — 74177 CT ABD & PELVIS W/CONTRAST: CPT | Mod: 26 | Performed by: RADIOLOGY

## 2025-01-01 PROCEDURE — 74177 CT ABD & PELVIS W/CONTRAST: CPT

## 2025-01-01 PROCEDURE — 250N000011 HC RX IP 250 OP 636

## 2025-01-01 PROCEDURE — 74177 CT ABD & PELVIS W/CONTRAST: CPT | Mod: 26 | Performed by: STUDENT IN AN ORGANIZED HEALTH CARE EDUCATION/TRAINING PROGRAM

## 2025-01-01 PROCEDURE — 74174 CTA ABD&PLVS W/CONTRAST: CPT

## 2025-01-01 PROCEDURE — 93306 TTE W/DOPPLER COMPLETE: CPT | Mod: 26 | Performed by: STUDENT IN AN ORGANIZED HEALTH CARE EDUCATION/TRAINING PROGRAM

## 2025-01-01 PROCEDURE — 999N000065 XR ABDOMEN PORT 1 VIEW

## 2025-01-01 PROCEDURE — 258N000003 HC RX IP 258 OP 636

## 2025-01-01 PROCEDURE — 999N000065 XR CHEST PORT 1 VIEW

## 2025-01-01 PROCEDURE — 71260 CT THORAX DX C+: CPT | Mod: 26 | Performed by: RADIOLOGY

## 2025-01-01 PROCEDURE — 71045 X-RAY EXAM CHEST 1 VIEW: CPT | Mod: 77

## 2025-01-01 RX ORDER — MAGNESIUM SULFATE HEPTAHYDRATE 40 MG/ML
INJECTION, SOLUTION INTRAVENOUS PRN
Status: DISCONTINUED | OUTPATIENT
Start: 2025-01-01 | End: 2025-01-01

## 2025-01-01 RX ORDER — CEFAZOLIN SODIUM/WATER 2 G/20 ML
SYRINGE (ML) INTRAVENOUS PRN
Status: DISCONTINUED | OUTPATIENT
Start: 2025-01-01 | End: 2025-01-01

## 2025-01-01 RX ORDER — SODIUM CHLORIDE, SODIUM GLUCONATE, SODIUM ACETATE, POTASSIUM CHLORIDE AND MAGNESIUM CHLORIDE 526; 502; 368; 37; 30 MG/100ML; MG/100ML; MG/100ML; MG/100ML; MG/100ML
INJECTION, SOLUTION INTRAVENOUS CONTINUOUS PRN
Status: DISCONTINUED | OUTPATIENT
Start: 2025-01-01 | End: 2025-01-01

## 2025-01-01 RX ORDER — FENTANYL CITRATE 50 UG/ML
INJECTION, SOLUTION INTRAMUSCULAR; INTRAVENOUS PRN
Status: DISCONTINUED | OUTPATIENT
Start: 2025-01-01 | End: 2025-01-01

## 2025-01-01 RX ORDER — KETAMINE HYDROCHLORIDE 10 MG/ML
INJECTION INTRAMUSCULAR; INTRAVENOUS PRN
Status: DISCONTINUED | OUTPATIENT
Start: 2025-01-01 | End: 2025-01-01

## 2025-01-01 RX ORDER — PROPOFOL 10 MG/ML
INJECTION, EMULSION INTRAVENOUS PRN
Status: DISCONTINUED | OUTPATIENT
Start: 2025-01-01 | End: 2025-01-01

## 2025-01-01 RX ORDER — POTASSIUM CHLORIDE 7.45 MG/ML
INJECTION INTRAVENOUS PRN
Status: DISCONTINUED | OUTPATIENT
Start: 2025-01-01 | End: 2025-01-01

## 2025-01-01 RX ORDER — VASOPRESSIN IN 0.9 % NACL 2 UNIT/2ML
SYRINGE (ML) INTRAVENOUS PRN
Status: DISCONTINUED | OUTPATIENT
Start: 2025-01-01 | End: 2025-01-01

## 2025-01-01 RX ORDER — CALCIUM CHLORIDE 100 MG/ML
INJECTION INTRAVENOUS; INTRAVENTRICULAR PRN
Status: DISCONTINUED | OUTPATIENT
Start: 2025-01-01 | End: 2025-01-01

## 2025-01-01 RX ORDER — ETOMIDATE 2 MG/ML
INJECTION INTRAVENOUS
Status: DISCONTINUED | OUTPATIENT
Start: 2025-01-01 | End: 2025-01-01

## 2025-01-01 RX ORDER — DEXAMETHASONE SODIUM PHOSPHATE 4 MG/ML
INJECTION, SOLUTION INTRA-ARTICULAR; INTRALESIONAL; INTRAMUSCULAR; INTRAVENOUS; SOFT TISSUE PRN
Status: DISCONTINUED | OUTPATIENT
Start: 2025-01-01 | End: 2025-01-01

## 2025-01-01 RX ORDER — LIDOCAINE HYDROCHLORIDE 20 MG/ML
INJECTION, SOLUTION INFILTRATION; PERINEURAL PRN
Status: DISCONTINUED | OUTPATIENT
Start: 2025-01-01 | End: 2025-01-01

## 2025-01-01 RX ORDER — INDOMETHACIN 25 MG/1
CAPSULE ORAL PRN
Status: DISCONTINUED | OUTPATIENT
Start: 2025-01-01 | End: 2025-01-01

## 2025-01-01 RX ORDER — ONDANSETRON 2 MG/ML
INJECTION INTRAMUSCULAR; INTRAVENOUS PRN
Status: DISCONTINUED | OUTPATIENT
Start: 2025-01-01 | End: 2025-01-01

## 2025-01-01 RX ADMIN — SODIUM CHLORIDE, SODIUM LACTATE, POTASSIUM CHLORIDE, AND CALCIUM CHLORIDE: .6; .31; .03; .02 INJECTION, SOLUTION INTRAVENOUS at 14:14

## 2025-01-01 RX ADMIN — Medication 20 MG: at 16:16

## 2025-01-01 RX ADMIN — DEXMEDETOMIDINE HYDROCHLORIDE 12 MCG: 100 INJECTION, SOLUTION INTRAVENOUS at 18:01

## 2025-01-01 RX ADMIN — ALBUMIN HUMAN: 0.05 INJECTION, SOLUTION INTRAVENOUS at 17:10

## 2025-01-01 RX ADMIN — HYDROMORPHONE HYDROCHLORIDE 0.5 MG: 1 INJECTION, SOLUTION INTRAMUSCULAR; INTRAVENOUS; SUBCUTANEOUS at 17:14

## 2025-01-01 RX ADMIN — PHENYLEPHRINE HYDROCHLORIDE 300 MCG: 10 INJECTION INTRAVENOUS at 11:37

## 2025-01-01 RX ADMIN — CALCIUM CHLORIDE INJECTION 500 MG: 100 INJECTION, SOLUTION INTRAVENOUS at 16:16

## 2025-01-01 RX ADMIN — SODIUM CHLORIDE, SODIUM GLUCONATE, SODIUM ACETATE, POTASSIUM CHLORIDE AND MAGNESIUM CHLORIDE: 526; 502; 368; 37; 30 INJECTION, SOLUTION INTRAVENOUS at 10:52

## 2025-01-01 RX ADMIN — Medication 1 UNITS: at 12:55

## 2025-01-01 RX ADMIN — CALCIUM CHLORIDE INJECTION 500 MG: 100 INJECTION, SOLUTION INTRAVENOUS at 15:26

## 2025-01-01 RX ADMIN — HYDROMORPHONE HYDROCHLORIDE 0.5 MG: 1 INJECTION, SOLUTION INTRAMUSCULAR; INTRAVENOUS; SUBCUTANEOUS at 18:00

## 2025-01-01 RX ADMIN — ALBUMIN HUMAN: 0.05 INJECTION, SOLUTION INTRAVENOUS at 17:00

## 2025-01-01 RX ADMIN — FENTANYL CITRATE 50 MCG: 50 INJECTION INTRAMUSCULAR; INTRAVENOUS at 18:00

## 2025-01-01 RX ADMIN — CALCIUM CHLORIDE INJECTION 200 MG: 100 INJECTION, SOLUTION INTRAVENOUS at 12:49

## 2025-01-01 RX ADMIN — ROCURONIUM BROMIDE 100 MG: 10 INJECTION, SOLUTION INTRAVENOUS at 08:05

## 2025-01-01 RX ADMIN — PHENYLEPHRINE HYDROCHLORIDE 1 MCG/KG/MIN: 10 INJECTION INTRAVENOUS at 11:49

## 2025-01-01 RX ADMIN — Medication 1 UNITS: at 17:10

## 2025-01-01 RX ADMIN — CALCIUM CHLORIDE INJECTION 200 MG: 100 INJECTION, SOLUTION INTRAVENOUS at 12:37

## 2025-01-01 RX ADMIN — NOREPINEPHRINE BITARTRATE 6.4 MCG: 1 INJECTION, SOLUTION, CONCENTRATE INTRAVENOUS at 15:55

## 2025-01-01 RX ADMIN — Medication 1 UNITS: at 17:03

## 2025-01-01 RX ADMIN — LIDOCAINE HYDROCHLORIDE 80 MG: 20 INJECTION, SOLUTION INFILTRATION; PERINEURAL at 11:22

## 2025-01-01 RX ADMIN — PHENYLEPHRINE HYDROCHLORIDE 200 MCG: 10 INJECTION INTRAVENOUS at 11:28

## 2025-01-01 RX ADMIN — DEXAMETHASONE SODIUM PHOSPHATE 4 MG: 4 INJECTION, SOLUTION INTRAMUSCULAR; INTRAVENOUS at 12:36

## 2025-01-01 RX ADMIN — Medication 1 UNITS: at 11:42

## 2025-01-01 RX ADMIN — Medication 20 MG: at 13:40

## 2025-01-01 RX ADMIN — DEXMEDETOMIDINE HYDROCHLORIDE 12 MCG: 100 INJECTION, SOLUTION INTRAVENOUS at 17:37

## 2025-01-01 RX ADMIN — Medication 1 UNITS: at 14:42

## 2025-01-01 RX ADMIN — SUCCINYLCHOLINE CHLORIDE 100 MG: 20 INJECTION, SOLUTION INTRAMUSCULAR; INTRAVENOUS; PARENTERAL at 11:24

## 2025-01-01 RX ADMIN — PHENYLEPHRINE HYDROCHLORIDE 100 MCG: 10 INJECTION INTRAVENOUS at 14:12

## 2025-01-01 RX ADMIN — EPINEPHRINE 20 MCG: 1 INJECTION INTRAMUSCULAR; INTRAVENOUS; SUBCUTANEOUS at 08:17

## 2025-01-01 RX ADMIN — Medication 1 UNITS: at 14:24

## 2025-01-01 RX ADMIN — Medication 10 MG: at 15:55

## 2025-01-01 RX ADMIN — Medication 50 MG: at 11:38

## 2025-01-01 RX ADMIN — MAGNESIUM SULFATE HEPTAHYDRATE 2 G: 2 INJECTION, SOLUTION INTRAVENOUS at 12:49

## 2025-01-01 RX ADMIN — PHENYLEPHRINE HYDROCHLORIDE 200 MCG: 10 INJECTION INTRAVENOUS at 11:34

## 2025-01-01 RX ADMIN — NOREPINEPHRINE BITARTRATE 64 MCG: 1 INJECTION, SOLUTION, CONCENTRATE INTRAVENOUS at 08:07

## 2025-01-01 RX ADMIN — ETOMIDATE 6 MG: 2 INJECTION, SOLUTION INTRAVENOUS at 08:05

## 2025-01-01 RX ADMIN — Medication 1 UNITS: at 15:07

## 2025-01-01 RX ADMIN — Medication 1 UNITS: at 11:50

## 2025-01-01 RX ADMIN — SODIUM CHLORIDE, SODIUM GLUCONATE, SODIUM ACETATE, POTASSIUM CHLORIDE AND MAGNESIUM CHLORIDE: 526; 502; 368; 37; 30 INJECTION, SOLUTION INTRAVENOUS at 15:30

## 2025-01-01 RX ADMIN — NOREPINEPHRINE BITARTRATE 6.4 MCG: 1 INJECTION, SOLUTION, CONCENTRATE INTRAVENOUS at 15:03

## 2025-01-01 RX ADMIN — SODIUM CHLORIDE, SODIUM GLUCONATE, SODIUM ACETATE, POTASSIUM CHLORIDE AND MAGNESIUM CHLORIDE: 526; 502; 368; 37; 30 INJECTION, SOLUTION INTRAVENOUS at 12:19

## 2025-01-01 RX ADMIN — Medication 20 MG: at 12:51

## 2025-01-01 RX ADMIN — CALCIUM CHLORIDE INJECTION 400 MG: 100 INJECTION, SOLUTION INTRAVENOUS at 13:52

## 2025-01-01 RX ADMIN — Medication 20 MG: at 17:00

## 2025-01-01 RX ADMIN — Medication 200 MG: at 17:32

## 2025-01-01 RX ADMIN — SODIUM BICARBONATE 50 MEQ: 84 INJECTION, SOLUTION INTRAVENOUS at 13:20

## 2025-01-01 RX ADMIN — NOREPINEPHRINE BITARTRATE 6.4 MCG: 1 INJECTION, SOLUTION, CONCENTRATE INTRAVENOUS at 14:53

## 2025-01-01 RX ADMIN — Medication 10 MG: at 14:42

## 2025-01-01 RX ADMIN — Medication 2 G: at 11:30

## 2025-01-01 RX ADMIN — CALCIUM CHLORIDE INJECTION 200 MG: 100 INJECTION, SOLUTION INTRAVENOUS at 12:44

## 2025-01-01 RX ADMIN — PHENYLEPHRINE HYDROCHLORIDE 200 MCG: 10 INJECTION INTRAVENOUS at 14:18

## 2025-01-01 RX ADMIN — POTASSIUM CHLORIDE 10 MEQ: 7.46 INJECTION, SOLUTION INTRAVENOUS at 13:29

## 2025-01-01 RX ADMIN — NOREPINEPHRINE BITARTRATE 6.4 MCG: 1 INJECTION, SOLUTION, CONCENTRATE INTRAVENOUS at 15:59

## 2025-01-01 RX ADMIN — ONDANSETRON 4 MG: 2 INJECTION INTRAMUSCULAR; INTRAVENOUS at 17:55

## 2025-01-01 RX ADMIN — SODIUM CHLORIDE, SODIUM GLUCONATE, SODIUM ACETATE, POTASSIUM CHLORIDE AND MAGNESIUM CHLORIDE: 526; 502; 368; 37; 30 INJECTION, SOLUTION INTRAVENOUS at 13:20

## 2025-01-01 RX ADMIN — POTASSIUM CHLORIDE 10 MEQ: 7.46 INJECTION, SOLUTION INTRAVENOUS at 12:36

## 2025-01-01 RX ADMIN — FENTANYL CITRATE 50 MCG: 50 INJECTION INTRAMUSCULAR; INTRAVENOUS at 17:38

## 2025-01-01 RX ADMIN — ALBUMIN HUMAN: 0.05 INJECTION, SOLUTION INTRAVENOUS at 16:22

## 2025-01-01 RX ADMIN — VASOPRESSIN 2 UNITS/HR: 20 INJECTION, SOLUTION INTRAMUSCULAR; SUBCUTANEOUS at 12:15

## 2025-01-01 RX ADMIN — Medication 20 MG: at 15:01

## 2025-01-01 RX ADMIN — Medication 2 G: at 15:30

## 2025-01-01 RX ADMIN — PROPOFOL 150 MG: 10 INJECTION, EMULSION INTRAVENOUS at 11:22

## 2025-01-01 ASSESSMENT — LIFESTYLE VARIABLES: TOBACCO_USE: 1

## 2025-01-01 ASSESSMENT — ENCOUNTER SYMPTOMS: SEIZURES: 1

## 2025-04-25 PROBLEM — R56.9 ALCOHOL RELATED SEIZURE (H): Status: ACTIVE | Noted: 2022-06-19

## 2025-04-25 PROBLEM — F10.20 ALCOHOLISM (H): Status: ACTIVE | Noted: 2022-05-17

## 2025-04-25 RX ORDER — FLUOCINONIDE 0.5 MG/G
GEL TOPICAL DAILY
COMMUNITY
Start: 2024-05-01

## 2025-04-30 ENCOUNTER — OFFICE VISIT (OUTPATIENT)
Dept: SURGERY | Facility: OTHER | Age: 70
End: 2025-04-30
Attending: STUDENT IN AN ORGANIZED HEALTH CARE EDUCATION/TRAINING PROGRAM
Payer: COMMERCIAL

## 2025-04-30 ENCOUNTER — PREP FOR PROCEDURE (OUTPATIENT)
Dept: SURGERY | Facility: OTHER | Age: 70
End: 2025-04-30

## 2025-04-30 VITALS — OXYGEN SATURATION: 97 % | SYSTOLIC BLOOD PRESSURE: 152 MMHG | HEART RATE: 81 BPM | DIASTOLIC BLOOD PRESSURE: 90 MMHG

## 2025-04-30 DIAGNOSIS — K42.9 UMBILICAL HERNIA WITHOUT OBSTRUCTION AND WITHOUT GANGRENE: Primary | ICD-10-CM

## 2025-04-30 DIAGNOSIS — M62.08 RECTUS DIASTASIS: ICD-10-CM

## 2025-04-30 DIAGNOSIS — K42.9 UMBILICAL HERNIA: Primary | ICD-10-CM

## 2025-04-30 PROCEDURE — G0463 HOSPITAL OUTPT CLINIC VISIT: HCPCS

## 2025-04-30 RX ORDER — LISINOPRIL 40 MG/1
40 TABLET ORAL DAILY
COMMUNITY
Start: 2025-03-31

## 2025-04-30 ASSESSMENT — PAIN SCALES - GENERAL: PAINLEVEL_OUTOF10: NO PAIN (0)
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
## 2025-04-30 NOTE — PATIENT INSTRUCTIONS
Thank you for allowing Dr. Brenner and our surgical team to participate in your care. Please call our health unit coordinator at [phone] with scheduling questions or the nurse at [phone] with any other questions or concerns.      You have been scheduled for: Open umbilical hernia repair with  on 5/30/25.   Please see handout for additional instruction.  You will need a pre-operative appointment with your primary care provider.  You may call [phone] or [phone] with any questions.

## 2025-05-11 ENCOUNTER — HEALTH MAINTENANCE LETTER (OUTPATIENT)
Age: 70
End: 2025-05-11

## 2025-05-23 RX ORDER — POLYETHYLENE GLYCOL 3350 17 G/17G
1 POWDER, FOR SOLUTION ORAL DAILY
COMMUNITY
End: 2025-06-04
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
## 2025-05-30 ENCOUNTER — HOSPITAL ENCOUNTER (OUTPATIENT)
Facility: HOSPITAL | Age: 70
Discharge: HOME | End: 2025-05-30
Attending: STUDENT IN AN ORGANIZED HEALTH CARE EDUCATION/TRAINING PROGRAM | Admitting: STUDENT IN AN ORGANIZED HEALTH CARE EDUCATION/TRAINING PROGRAM
Payer: COMMERCIAL

## 2025-05-30 ENCOUNTER — APPOINTMENT (OUTPATIENT)
Dept: ULTRASOUND IMAGING | Facility: HOSPITAL | Age: 70
End: 2025-05-30
Attending: NURSE ANESTHETIST, CERTIFIED REGISTERED
Payer: COMMERCIAL

## 2025-05-30 VITALS
HEART RATE: 91 BPM | RESPIRATION RATE: 16 BRPM | HEIGHT: 67 IN | DIASTOLIC BLOOD PRESSURE: 106 MMHG | TEMPERATURE: 98.3 F | BODY MASS INDEX: 26.27 KG/M2 | WEIGHT: 167.4 LBS | OXYGEN SATURATION: 97 % | SYSTOLIC BLOOD PRESSURE: 162 MMHG

## 2025-05-30 DIAGNOSIS — K42.9 UMBILICAL HERNIA WITHOUT OBSTRUCTION AND WITHOUT GANGRENE: Primary | ICD-10-CM

## 2025-05-30 PROCEDURE — 88305 TISSUE EXAM BY PATHOLOGIST: CPT | Mod: 26 | Performed by: PATHOLOGY

## 2025-05-30 PROCEDURE — 250N000011 HC RX IP 250 OP 636: Performed by: STUDENT IN AN ORGANIZED HEALTH CARE EDUCATION/TRAINING PROGRAM

## 2025-05-30 PROCEDURE — 88341 IMHCHEM/IMCYTCHM EA ADD ANTB: CPT | Mod: TC | Performed by: STUDENT IN AN ORGANIZED HEALTH CARE EDUCATION/TRAINING PROGRAM

## 2025-05-30 PROCEDURE — 49591 RPR AA HRN 1ST < 3 CM RDC: CPT | Performed by: STUDENT IN AN ORGANIZED HEALTH CARE EDUCATION/TRAINING PROGRAM

## 2025-05-30 PROCEDURE — 370N000017 HC ANESTHESIA TECHNICAL FEE, PER MIN: Performed by: STUDENT IN AN ORGANIZED HEALTH CARE EDUCATION/TRAINING PROGRAM

## 2025-05-30 PROCEDURE — 272N000001 HC OR GENERAL SUPPLY STERILE: Performed by: STUDENT IN AN ORGANIZED HEALTH CARE EDUCATION/TRAINING PROGRAM

## 2025-05-30 PROCEDURE — 49180 BIOPSY ABDOMINAL MASS: CPT | Performed by: STUDENT IN AN ORGANIZED HEALTH CARE EDUCATION/TRAINING PROGRAM

## 2025-05-30 PROCEDURE — 710N000010 HC RECOVERY PHASE 1, LEVEL 2, PER MIN: Performed by: STUDENT IN AN ORGANIZED HEALTH CARE EDUCATION/TRAINING PROGRAM

## 2025-05-30 PROCEDURE — 710N000012 HC RECOVERY PHASE 2, PER MINUTE: Performed by: STUDENT IN AN ORGANIZED HEALTH CARE EDUCATION/TRAINING PROGRAM

## 2025-05-30 PROCEDURE — 88342 IMHCHEM/IMCYTCHM 1ST ANTB: CPT | Mod: 26 | Performed by: PATHOLOGY

## 2025-05-30 PROCEDURE — 250N000011 HC RX IP 250 OP 636: Mod: JZ | Performed by: NURSE PRACTITIONER

## 2025-05-30 PROCEDURE — 999N000141 HC STATISTIC PRE-PROCEDURE NURSING ASSESSMENT: Performed by: STUDENT IN AN ORGANIZED HEALTH CARE EDUCATION/TRAINING PROGRAM

## 2025-05-30 PROCEDURE — 360N000076 HC SURGERY LEVEL 3, PER MIN: Performed by: STUDENT IN AN ORGANIZED HEALTH CARE EDUCATION/TRAINING PROGRAM

## 2025-05-30 PROCEDURE — 88341 IMHCHEM/IMCYTCHM EA ADD ANTB: CPT | Mod: 26 | Performed by: PATHOLOGY

## 2025-05-30 PROCEDURE — 250N000025 HC SEVOFLURANE, PER MIN: Performed by: STUDENT IN AN ORGANIZED HEALTH CARE EDUCATION/TRAINING PROGRAM

## 2025-05-30 PROCEDURE — 258N000003 HC RX IP 258 OP 636: Performed by: NURSE PRACTITIONER

## 2025-05-30 PROCEDURE — 88307 TISSUE EXAM BY PATHOLOGIST: CPT | Mod: 26 | Performed by: PATHOLOGY

## 2025-05-30 RX ORDER — DEXAMETHASONE SODIUM PHOSPHATE 4 MG/ML
4 INJECTION, SOLUTION INTRA-ARTICULAR; INTRALESIONAL; INTRAMUSCULAR; INTRAVENOUS; SOFT TISSUE
Status: DISCONTINUED | OUTPATIENT
Start: 2025-05-30 | End: 2025-05-30 | Stop reason: HOSPADM

## 2025-05-30 RX ORDER — ONDANSETRON 4 MG/1
4 TABLET, ORALLY DISINTEGRATING ORAL EVERY 30 MIN PRN
Status: DISCONTINUED | OUTPATIENT
Start: 2025-05-30 | End: 2025-05-30 | Stop reason: HOSPADM

## 2025-05-30 RX ORDER — OXYCODONE HYDROCHLORIDE 5 MG/1
5 TABLET ORAL
Refills: 0 | Status: CANCELLED | OUTPATIENT
Start: 2025-05-30

## 2025-05-30 RX ORDER — SODIUM CHLORIDE, SODIUM LACTATE, POTASSIUM CHLORIDE, CALCIUM CHLORIDE 600; 310; 30; 20 MG/100ML; MG/100ML; MG/100ML; MG/100ML
INJECTION, SOLUTION INTRAVENOUS CONTINUOUS
Status: DISCONTINUED | OUTPATIENT
Start: 2025-05-30 | End: 2025-05-30 | Stop reason: HOSPADM

## 2025-05-30 RX ORDER — ALBUTEROL SULFATE 0.83 MG/ML
2.5 SOLUTION RESPIRATORY (INHALATION) EVERY 4 HOURS PRN
Status: DISCONTINUED | OUTPATIENT
Start: 2025-05-30 | End: 2025-05-30 | Stop reason: HOSPADM

## 2025-05-30 RX ORDER — HYDRALAZINE HYDROCHLORIDE 20 MG/ML
2.5-5 INJECTION INTRAMUSCULAR; INTRAVENOUS EVERY 10 MIN PRN
Status: DISCONTINUED | OUTPATIENT
Start: 2025-05-30 | End: 2025-05-30 | Stop reason: HOSPADM

## 2025-05-30 RX ORDER — DIAZEPAM 10 MG/2ML
2.5 INJECTION, SOLUTION INTRAMUSCULAR; INTRAVENOUS
Status: DISCONTINUED | OUTPATIENT
Start: 2025-05-30 | End: 2025-05-30 | Stop reason: HOSPADM

## 2025-05-30 RX ORDER — FENTANYL CITRATE 50 UG/ML
25 INJECTION, SOLUTION INTRAMUSCULAR; INTRAVENOUS EVERY 5 MIN PRN
Status: DISCONTINUED | OUTPATIENT
Start: 2025-05-30 | End: 2025-05-30 | Stop reason: HOSPADM

## 2025-05-30 RX ORDER — FENTANYL CITRATE 50 UG/ML
INJECTION, SOLUTION INTRAMUSCULAR; INTRAVENOUS
Status: COMPLETED
Start: 2025-05-30 | End: 2025-05-30

## 2025-05-30 RX ORDER — HYDROMORPHONE HYDROCHLORIDE 1 MG/ML
0.25 INJECTION, SOLUTION INTRAMUSCULAR; INTRAVENOUS; SUBCUTANEOUS EVERY 5 MIN PRN
Status: DISCONTINUED | OUTPATIENT
Start: 2025-05-30 | End: 2025-05-30 | Stop reason: HOSPADM

## 2025-05-30 RX ORDER — AMOXICILLIN 250 MG
1 CAPSULE ORAL DAILY
Qty: 30 TABLET | Refills: 0 | Status: SHIPPED | OUTPATIENT
Start: 2025-05-30 | End: 2025-06-04

## 2025-05-30 RX ORDER — LIDOCAINE 40 MG/G
CREAM TOPICAL
Status: DISCONTINUED | OUTPATIENT
Start: 2025-05-30 | End: 2025-05-30 | Stop reason: HOSPADM

## 2025-05-30 RX ORDER — NALOXONE HYDROCHLORIDE 0.4 MG/ML
0.1 INJECTION, SOLUTION INTRAMUSCULAR; INTRAVENOUS; SUBCUTANEOUS
Status: DISCONTINUED | OUTPATIENT
Start: 2025-05-30 | End: 2025-05-30 | Stop reason: HOSPADM

## 2025-05-30 RX ORDER — OXYCODONE HYDROCHLORIDE 5 MG/1
5-10 TABLET ORAL EVERY 4 HOURS PRN
Qty: 10 TABLET | Refills: 0 | Status: SHIPPED | OUTPATIENT
Start: 2025-05-30 | End: 2025-06-04

## 2025-05-30 RX ORDER — HALOPERIDOL 5 MG/ML
2 INJECTION INTRAMUSCULAR
Status: DISCONTINUED | OUTPATIENT
Start: 2025-05-30 | End: 2025-05-30 | Stop reason: HOSPADM

## 2025-05-30 RX ORDER — CEFAZOLIN SODIUM/WATER 2 G/20 ML
2 SYRINGE (ML) INTRAVENOUS
Status: COMPLETED | OUTPATIENT
Start: 2025-05-30 | End: 2025-05-30

## 2025-05-30 RX ORDER — BUPIVACAINE HYDROCHLORIDE 2.5 MG/ML
INJECTION, SOLUTION EPIDURAL; INFILTRATION; INTRACAUDAL; PERINEURAL
Status: DISCONTINUED
Start: 2025-05-30 | End: 2025-05-30 | Stop reason: WASHOUT

## 2025-05-30 RX ORDER — FENTANYL CITRATE 50 UG/ML
50 INJECTION, SOLUTION INTRAMUSCULAR; INTRAVENOUS EVERY 5 MIN PRN
Status: DISCONTINUED | OUTPATIENT
Start: 2025-05-30 | End: 2025-05-30 | Stop reason: HOSPADM

## 2025-05-30 RX ORDER — LIDOCAINE HYDROCHLORIDE 10 MG/ML
INJECTION, SOLUTION EPIDURAL; INFILTRATION; INTRACAUDAL; PERINEURAL
Status: DISCONTINUED
Start: 2025-05-30 | End: 2025-05-30 | Stop reason: WASHOUT

## 2025-05-30 RX ORDER — CEFAZOLIN SODIUM/WATER 2 G/20 ML
2 SYRINGE (ML) INTRAVENOUS SEE ADMIN INSTRUCTIONS
Status: DISCONTINUED | OUTPATIENT
Start: 2025-05-30 | End: 2025-05-30 | Stop reason: HOSPADM

## 2025-05-30 RX ORDER — ONDANSETRON 2 MG/ML
4 INJECTION INTRAMUSCULAR; INTRAVENOUS EVERY 30 MIN PRN
Status: DISCONTINUED | OUTPATIENT
Start: 2025-05-30 | End: 2025-05-30 | Stop reason: HOSPADM

## 2025-05-30 RX ORDER — HYDROMORPHONE HYDROCHLORIDE 1 MG/ML
0.5 INJECTION, SOLUTION INTRAMUSCULAR; INTRAVENOUS; SUBCUTANEOUS EVERY 5 MIN PRN
Status: DISCONTINUED | OUTPATIENT
Start: 2025-05-30 | End: 2025-05-30 | Stop reason: HOSPADM

## 2025-05-30 RX ORDER — BUPIVACAINE HYDROCHLORIDE 2.5 MG/ML
INJECTION, SOLUTION EPIDURAL; INFILTRATION; INTRACAUDAL; PERINEURAL
Status: COMPLETED
Start: 2025-05-30 | End: 2025-05-30

## 2025-05-30 RX ADMIN — SODIUM CHLORIDE, SODIUM LACTATE, POTASSIUM CHLORIDE, AND CALCIUM CHLORIDE: .6; .31; .03; .02 INJECTION, SOLUTION INTRAVENOUS at 08:09

## 2025-05-30 RX ADMIN — Medication 2 G: at 09:06

## 2025-05-30 RX ADMIN — HYDRALAZINE HYDROCHLORIDE 5 MG: 20 INJECTION INTRAMUSCULAR; INTRAVENOUS at 11:04

## 2025-05-30 ASSESSMENT — ACTIVITIES OF DAILY LIVING (ADL)
ADLS_ACUITY_SCORE: 15

## 2025-05-30 NOTE — OP NOTE
General Surgery Operative Note    PREOPERATIVE DIAGNOSIS:  Umbilical hernia [K42.9]    POSTOPERATIVE DIAGNOSIS: Umbilical mass, omental induration, umbilical hernia without obstruction or gangrene    PROCEDURE:    Primary open umbilical hernia repair  Umbilical mass biopsy  Omental biopsy    ANESTHESIA: MAC with regional anesthesia    SURGEON:  Rainer Brenner MD    ASSISTANT: DUANE Willis. Assistant was required owing to challenging exposure and need for retraction.     INDICATIONS: Patient is a 69-year-old male who was seen in clinic with a symptomatic umbilical hernia.  After discussion of the risks and benefits including not limited to bleeding, infection, recurrence, damage to intra-abdominal organs and other surrounding structures, and postoperative pain, the patient agreed to proceed with the above procedure.    PROCEDURE:  The patient was taken to the operating suite and uneventfully endotracheally intubated.  The abdomen was prepped and draped in a sterile fashion.  Surgeon initiated timeout was acknowledged.      A supraumbilical incision was made using a #15 scalpel.  Electrocautery and blunt dissection were used to reach the area of concern at the abdominal fascia.  Palpation of this area revealed an area of nodularity as well as a 5 mm supraumbilical hernia.  There was a multinodular circumferential area of induration surrounding the hernia defect.  A sample of this nodularity was removed for biopsy.  Unfortunately we do not have frozen biopsy available for immediate review in our institution today so this was sent for routine pathology in formalin.  Further palpation of the surrounding fascia and preperitoneal fat revealed additional areas of nodularity most notably infraumbilical about 2 cm caudad to our fascial incision.  There was also some palpable omental induration concerning for caking of the nearby omentum.  A small piece of this was removed for biopsy using electrocautery.  Following  excision of several areas of nodularity around the initial hernia defect, the fascial defect measured approximately 5 cm in diameter.  Given the small size of the initial hernia and the new findings of this nodularity and omental induration, we did not proceed with mesh placement.  The fascia was reapproximated with multiple figure-of-eight 0 Ethibond sutures transversely.  The surgical site was thoroughly irrigated with normal saline.  The skin was reapproximated with multiple deep dermal 3-0 Vicryl sutures and a running 4-0 subcuticular Monocryl suture.  Dermabond was applied        The patient was uneventfully extubated, awakened and taken to the PACU in stable condition.  At the conclusion of the case, all lap and needle counts were correct.      ESTIMATED BLOOD LOSS:  10cc    INTRAOPERATIVE FINDINGS: Tiny umbilical hernia measuring 0.5 cm in diameter with surrounding nodularity in the fascia and preperitoneal layers on palpation biopsies of this nodularity were obtained.  Induration of underlying omentum, biopsy obtained and sent for routine pathology.       Rainer Brenner MD

## 2025-05-30 NOTE — OR NURSING
PACU Respiratory Event Documentation     1) Episodes of Apnea greater than or equal to 10 seconds: 0    2) Bradypnea - less than 8 breaths per minute: 0    3) Pain score on 0 to 10 scale: 0    4) Pain-sedation mismatch (yes or no): no    5) Repeated 02 desaturation less than 90% (yes or no): no    Anesthesia notified? (yes or no): no    Any of the above events occuring repeatedly in separate 30 minute intervals may be considered recurrent PACU respiratory events.

## 2025-05-30 NOTE — OR NURSING
Patient and responsible adult given discharge instructions with no questions regarding instructions. Meng score 20/20. Pain level 0/10.  Discharged from unit via wheelchair. Patient discharged to home with friend and son.

## 2025-05-30 NOTE — DISCHARGE INSTRUCTIONS
After Anesthesia (Sleep Medicine)  What should I do after anesthesia?  You should rest and relax for the next 24 hours. Avoid risky or difficult (strenuous) activity. A responsible adult should stay with you overnight.  Don't drive or use any heavy equipment for 24 hours. Even if you feel normal, your reactions may be affected by the sleep medicine given to you.  Don't drink alcohol or make any important decisions for 24 hours.  Slowly get back to your regular diet, as you feel able.  How should I expect to feel?  It's normal to feel dizzy, light-headed, or faint for up to a full day after anesthesia or while taking pain medicine. If this happens:   Sit down for a few minutes before standing.  Have someone help you when you get up to walk or use the bathroom.  If you have nausea (feel sick to your stomach) or vomit (throw up):   Drink clear liquids (such as apple juice, ginger ale, broth, or 7UP) until you feel better.  If you feel sick to your stomach, or you keep vomiting for 24 hours, please call the doctor.  What else should I know?  You might have a dry mouth, sore throat, muscle aches, or trouble sleeping. These should go away after 24 hours.  Please contact your doctor if you have any other symptoms that concern you, such as fever, pain, bleeding, fluid drainage, swelling, or headache, or if it's been over 8 to 10 hours and you still aren't able to pee (urinate).  If you have a history of sleep apnea, it's very important to use your CPAP machine for the next 24 hours when you nap or sleep.   For informational purposes only. Not to replace the advice of your health care provider. Copyright   2023 [email or web address]. All rights reserved. Clinically reviewed by Jr Bustamante MD. Preo [id number] - REV 09/23.

## 2025-06-04 ENCOUNTER — HOSPITAL ENCOUNTER (INPATIENT)
Facility: CLINIC | Age: 70
End: 2025-06-04
Attending: EMERGENCY MEDICINE | Admitting: INTERNAL MEDICINE
Payer: COMMERCIAL

## 2025-06-04 ENCOUNTER — NURSE TRIAGE (OUTPATIENT)
Dept: NURSING | Facility: CLINIC | Age: 70
End: 2025-06-04
Payer: COMMERCIAL

## 2025-06-04 ENCOUNTER — HOSPITAL ENCOUNTER (EMERGENCY)
Facility: HOSPITAL | Age: 70
Discharge: OTHER INSTITUTION | End: 2025-06-04
Attending: EMERGENCY MEDICINE
Payer: COMMERCIAL

## 2025-06-04 VITALS
RESPIRATION RATE: 23 BRPM | SYSTOLIC BLOOD PRESSURE: 156 MMHG | HEIGHT: 67 IN | TEMPERATURE: 98.9 F | OXYGEN SATURATION: 94 % | WEIGHT: 175 LBS | HEART RATE: 93 BPM | BODY MASS INDEX: 27.47 KG/M2 | DIASTOLIC BLOOD PRESSURE: 102 MMHG

## 2025-06-04 DIAGNOSIS — R10.84 ABDOMINAL PAIN, GENERALIZED: ICD-10-CM

## 2025-06-04 DIAGNOSIS — C26.0: Primary | ICD-10-CM

## 2025-06-04 DIAGNOSIS — K56.609 SBO (SMALL BOWEL OBSTRUCTION) (H): ICD-10-CM

## 2025-06-04 DIAGNOSIS — I26.99 OTHER PULMONARY EMBOLISM WITHOUT ACUTE COR PULMONALE, UNSPECIFIED CHRONICITY (H): ICD-10-CM

## 2025-06-04 DIAGNOSIS — I99.8 LIMB ISCHEMIA: ICD-10-CM

## 2025-06-04 DIAGNOSIS — I26.94 MULTIPLE SUBSEGMENTAL PULMONARY EMBOLI WITHOUT ACUTE COR PULMONALE (H): ICD-10-CM

## 2025-06-04 DIAGNOSIS — R11.2 NAUSEA AND VOMITING, UNSPECIFIED VOMITING TYPE: ICD-10-CM

## 2025-06-04 DIAGNOSIS — E87.1 HYPONATREMIA: ICD-10-CM

## 2025-06-04 DIAGNOSIS — K86.89 PANCREATIC MASS: ICD-10-CM

## 2025-06-04 LAB
ABO + RH BLD: NORMAL
ALBUMIN SERPL BCG-MCNC: 4 G/DL (ref 3.5–5.2)
ALBUMIN UR-MCNC: NEGATIVE MG/DL
ALP SERPL-CCNC: 79 U/L (ref 40–150)
ALT SERPL W P-5'-P-CCNC: 18 U/L (ref 0–70)
ANION GAP SERPL CALCULATED.3IONS-SCNC: 16 MMOL/L (ref 7–15)
APPEARANCE UR: CLEAR
AST SERPL W P-5'-P-CCNC: 21 U/L (ref 0–45)
ATRIAL RATE - MUSE: 107 BPM
BASOPHILS # BLD AUTO: 0 10E3/UL (ref 0–0.2)
BASOPHILS NFR BLD AUTO: 0 %
BILIRUB DIRECT SERPL-MCNC: 0.19 MG/DL (ref 0–0.3)
BILIRUB SERPL-MCNC: 0.5 MG/DL
BILIRUB UR QL STRIP: NEGATIVE
BLD GP AB SCN SERPL QL: NEGATIVE
BUN SERPL-MCNC: 8.8 MG/DL (ref 8–23)
CALCIUM SERPL-MCNC: 10.2 MG/DL (ref 8.8–10.4)
CEA SERPL-MCNC: 15.4 NG/ML
CHLORIDE SERPL-SCNC: 93 MMOL/L (ref 98–107)
COLOR UR AUTO: ABNORMAL
CREAT SERPL-MCNC: 0.84 MG/DL (ref 0.67–1.17)
CRP SERPL-MCNC: 95.99 MG/L
DIASTOLIC BLOOD PRESSURE - MUSE: NORMAL MMHG
EGFRCR SERPLBLD CKD-EPI 2021: >90 ML/MIN/1.73M2
EOSINOPHIL # BLD AUTO: 0 10E3/UL (ref 0–0.7)
EOSINOPHIL NFR BLD AUTO: 0 %
ERYTHROCYTE [DISTWIDTH] IN BLOOD BY AUTOMATED COUNT: 12.9 % (ref 10–15)
ERYTHROCYTE [DISTWIDTH] IN BLOOD BY AUTOMATED COUNT: 13 % (ref 10–15)
EST. AVERAGE GLUCOSE BLD GHB EST-MCNC: 126 MG/DL
GLUCOSE SERPL-MCNC: 161 MG/DL (ref 70–99)
GLUCOSE UR STRIP-MCNC: NEGATIVE MG/DL
HBA1C MFR BLD: 6 %
HCO3 SERPL-SCNC: 21 MMOL/L (ref 22–29)
HCT VFR BLD AUTO: 44.1 % (ref 40–53)
HCT VFR BLD AUTO: 44.9 % (ref 40–53)
HGB BLD-MCNC: 15.3 G/DL (ref 13.3–17.7)
HGB BLD-MCNC: 15.6 G/DL (ref 13.3–17.7)
HGB UR QL STRIP: NEGATIVE
HOLD SPECIMEN: NORMAL
HYALINE CASTS: 5 /LPF
IMM GRANULOCYTES # BLD: 0.1 10E3/UL
IMM GRANULOCYTES NFR BLD: 0 %
INR PPP: 1.36 (ref 0.85–1.15)
INTERPRETATION ECG - MUSE: NORMAL
KETONES UR STRIP-MCNC: 60 MG/DL
LACTATE SERPL-SCNC: 1.5 MMOL/L (ref 0.7–2)
LEUKOCYTE ESTERASE UR QL STRIP: NEGATIVE
LIPASE SERPL-CCNC: 41 U/L (ref 13–60)
LYMPHOCYTES # BLD AUTO: 0.8 10E3/UL (ref 0.8–5.3)
LYMPHOCYTES NFR BLD AUTO: 7 %
MCH RBC QN AUTO: 30.6 PG (ref 26.5–33)
MCH RBC QN AUTO: 30.8 PG (ref 26.5–33)
MCHC RBC AUTO-ENTMCNC: 34.7 G/DL (ref 31.5–36.5)
MCHC RBC AUTO-ENTMCNC: 34.7 G/DL (ref 31.5–36.5)
MCV RBC AUTO: 88 FL (ref 78–100)
MCV RBC AUTO: 89 FL (ref 78–100)
MONOCYTES # BLD AUTO: 0.7 10E3/UL (ref 0–1.3)
MONOCYTES NFR BLD AUTO: 6 %
MUCOUS THREADS #/AREA URNS LPF: PRESENT /LPF
NEUTROPHILS # BLD AUTO: 9.7 10E3/UL (ref 1.6–8.3)
NEUTROPHILS NFR BLD AUTO: 85 %
NITRATE UR QL: NEGATIVE
NRBC # BLD AUTO: 0 10E3/UL
NRBC BLD AUTO-RTO: 0 /100
P AXIS - MUSE: 39 DEGREES
PATH REPORT.COMMENTS IMP SPEC: ABNORMAL
PATH REPORT.COMMENTS IMP SPEC: ABNORMAL
PATH REPORT.COMMENTS IMP SPEC: YES
PATH REPORT.FINAL DX SPEC: ABNORMAL
PATH REPORT.GROSS SPEC: ABNORMAL
PATH REPORT.MICROSCOPIC SPEC OTHER STN: ABNORMAL
PATH REPORT.RELEVANT HX SPEC: ABNORMAL
PH UR STRIP: 6 [PH] (ref 4.7–8)
PHOTO IMAGE: ABNORMAL
PLATELET # BLD AUTO: 244 10E3/UL (ref 150–450)
PLATELET # BLD AUTO: 270 10E3/UL (ref 150–450)
POTASSIUM SERPL-SCNC: 4.1 MMOL/L (ref 3.4–5.3)
PR INTERVAL - MUSE: 206 MS
PROT SERPL-MCNC: 7.8 G/DL (ref 6.4–8.3)
PROTHROMBIN TIME: 16.9 SECONDS (ref 11.8–14.8)
QRS DURATION - MUSE: 76 MS
QT - MUSE: 346 MS
QTC - MUSE: 461 MS
R AXIS - MUSE: -13 DEGREES
RBC # BLD AUTO: 4.96 10E6/UL (ref 4.4–5.9)
RBC # BLD AUTO: 5.09 10E6/UL (ref 4.4–5.9)
RBC URINE: 1 /HPF
SODIUM SERPL-SCNC: 130 MMOL/L (ref 135–145)
SP GR UR STRIP: 1.01 (ref 1–1.03)
SPECIMEN EXP DATE BLD: NORMAL
SQUAMOUS EPITHELIAL: 0 /HPF
SYSTOLIC BLOOD PRESSURE - MUSE: NORMAL MMHG
T AXIS - MUSE: 37 DEGREES
UFH PPP CHRO-ACNC: 0.72 IU/ML (ref ?–1.1)
UROBILINOGEN UR STRIP-MCNC: NORMAL MG/DL
VENTRICULAR RATE- MUSE: 107 BPM
WBC # BLD AUTO: 11.4 10E3/UL (ref 4–11)
WBC # BLD AUTO: 13 10E3/UL (ref 4–11)
WBC URINE: 2 /HPF

## 2025-06-04 PROCEDURE — 250N000011 HC RX IP 250 OP 636: Performed by: EMERGENCY MEDICINE

## 2025-06-04 PROCEDURE — 99285 EMERGENCY DEPT VISIT HI MDM: CPT | Performed by: EMERGENCY MEDICINE

## 2025-06-04 PROCEDURE — 96365 THER/PROPH/DIAG IV INF INIT: CPT | Performed by: EMERGENCY MEDICINE

## 2025-06-04 PROCEDURE — 82565 ASSAY OF CREATININE: CPT | Performed by: INTERNAL MEDICINE

## 2025-06-04 PROCEDURE — 85520 HEPARIN ASSAY: CPT | Performed by: PHYSICIAN ASSISTANT

## 2025-06-04 PROCEDURE — 85520 HEPARIN ASSAY: CPT | Performed by: EMERGENCY MEDICINE

## 2025-06-04 PROCEDURE — 96366 THER/PROPH/DIAG IV INF ADDON: CPT

## 2025-06-04 PROCEDURE — 85018 HEMOGLOBIN: CPT | Performed by: INTERNAL MEDICINE

## 2025-06-04 PROCEDURE — 99207 PR APP CREDIT; MD BILLING SHARED VISIT: CPT | Performed by: STUDENT IN AN ORGANIZED HEALTH CARE EDUCATION/TRAINING PROGRAM

## 2025-06-04 PROCEDURE — 96361 HYDRATE IV INFUSION ADD-ON: CPT

## 2025-06-04 PROCEDURE — 93005 ELECTROCARDIOGRAM TRACING: CPT

## 2025-06-04 PROCEDURE — 83690 ASSAY OF LIPASE: CPT | Performed by: EMERGENCY MEDICINE

## 2025-06-04 PROCEDURE — 85014 HEMATOCRIT: CPT | Performed by: EMERGENCY MEDICINE

## 2025-06-04 PROCEDURE — 80048 BASIC METABOLIC PNL TOTAL CA: CPT | Performed by: INTERNAL MEDICINE

## 2025-06-04 PROCEDURE — 99222 1ST HOSP IP/OBS MODERATE 55: CPT | Mod: GC | Performed by: INTERNAL MEDICINE

## 2025-06-04 PROCEDURE — 96365 THER/PROPH/DIAG IV INF INIT: CPT | Mod: XU

## 2025-06-04 PROCEDURE — 99285 EMERGENCY DEPT VISIT HI MDM: CPT | Mod: 25 | Performed by: EMERGENCY MEDICINE

## 2025-06-04 PROCEDURE — 80048 BASIC METABOLIC PNL TOTAL CA: CPT | Performed by: EMERGENCY MEDICINE

## 2025-06-04 PROCEDURE — 250N000013 HC RX MED GY IP 250 OP 250 PS 637: Performed by: EMERGENCY MEDICINE

## 2025-06-04 PROCEDURE — 82378 CARCINOEMBRYONIC ANTIGEN: CPT

## 2025-06-04 PROCEDURE — 250N000013 HC RX MED GY IP 250 OP 250 PS 637: Performed by: PHYSICIAN ASSISTANT

## 2025-06-04 PROCEDURE — 83605 ASSAY OF LACTIC ACID: CPT | Performed by: INTERNAL MEDICINE

## 2025-06-04 PROCEDURE — 96375 TX/PRO/DX INJ NEW DRUG ADDON: CPT | Mod: XU

## 2025-06-04 PROCEDURE — 120N000002 HC R&B MED SURG/OB UMMC

## 2025-06-04 PROCEDURE — 258N000003 HC RX IP 258 OP 636: Performed by: EMERGENCY MEDICINE

## 2025-06-04 PROCEDURE — 84155 ASSAY OF PROTEIN SERUM: CPT | Performed by: EMERGENCY MEDICINE

## 2025-06-04 PROCEDURE — 99418 PROLNG IP/OBS E/M EA 15 MIN: CPT | Mod: FS

## 2025-06-04 PROCEDURE — 86140 C-REACTIVE PROTEIN: CPT | Performed by: EMERGENCY MEDICINE

## 2025-06-04 PROCEDURE — 250N000011 HC RX IP 250 OP 636: Mod: JW

## 2025-06-04 PROCEDURE — 36415 COLL VENOUS BLD VENIPUNCTURE: CPT

## 2025-06-04 PROCEDURE — 258N000003 HC RX IP 258 OP 636

## 2025-06-04 PROCEDURE — 85025 COMPLETE CBC W/AUTO DIFF WBC: CPT | Performed by: EMERGENCY MEDICINE

## 2025-06-04 PROCEDURE — 36415 COLL VENOUS BLD VENIPUNCTURE: CPT | Performed by: EMERGENCY MEDICINE

## 2025-06-04 PROCEDURE — 36415 COLL VENOUS BLD VENIPUNCTURE: CPT | Performed by: INTERNAL MEDICINE

## 2025-06-04 PROCEDURE — 99285 EMERGENCY DEPT VISIT HI MDM: CPT | Mod: 25

## 2025-06-04 PROCEDURE — 86301 IMMUNOASSAY TUMOR CA 19-9: CPT

## 2025-06-04 PROCEDURE — 250N000011 HC RX IP 250 OP 636: Mod: JZ | Performed by: PHYSICIAN ASSISTANT

## 2025-06-04 PROCEDURE — 86850 RBC ANTIBODY SCREEN: CPT | Performed by: EMERGENCY MEDICINE

## 2025-06-04 PROCEDURE — 99447 NTRPROF PH1/NTRNET/EHR 11-20: CPT | Mod: GC | Performed by: SURGERY

## 2025-06-04 PROCEDURE — 85004 AUTOMATED DIFF WBC COUNT: CPT | Performed by: INTERNAL MEDICINE

## 2025-06-04 PROCEDURE — 86140 C-REACTIVE PROTEIN: CPT | Performed by: INTERNAL MEDICINE

## 2025-06-04 PROCEDURE — 36415 COLL VENOUS BLD VENIPUNCTURE: CPT | Performed by: PHYSICIAN ASSISTANT

## 2025-06-04 PROCEDURE — 99284 EMERGENCY DEPT VISIT MOD MDM: CPT | Mod: 24 | Performed by: STUDENT IN AN ORGANIZED HEALTH CARE EDUCATION/TRAINING PROGRAM

## 2025-06-04 PROCEDURE — 93010 ELECTROCARDIOGRAM REPORT: CPT | Performed by: INTERNAL MEDICINE

## 2025-06-04 PROCEDURE — 81003 URINALYSIS AUTO W/O SCOPE: CPT | Performed by: EMERGENCY MEDICINE

## 2025-06-04 PROCEDURE — 85610 PROTHROMBIN TIME: CPT | Performed by: EMERGENCY MEDICINE

## 2025-06-04 PROCEDURE — 250N000013 HC RX MED GY IP 250 OP 250 PS 637

## 2025-06-04 PROCEDURE — 86901 BLOOD TYPING SEROLOGIC RH(D): CPT | Performed by: EMERGENCY MEDICINE

## 2025-06-04 PROCEDURE — 96375 TX/PRO/DX INJ NEW DRUG ADDON: CPT | Performed by: EMERGENCY MEDICINE

## 2025-06-04 PROCEDURE — 83036 HEMOGLOBIN GLYCOSYLATED A1C: CPT

## 2025-06-04 PROCEDURE — 250N000011 HC RX IP 250 OP 636: Performed by: RADIOLOGY

## 2025-06-04 PROCEDURE — 99223 1ST HOSP IP/OBS HIGH 75: CPT | Mod: FS

## 2025-06-04 RX ORDER — HEPARIN SODIUM 10000 [USP'U]/100ML
0-5000 INJECTION, SOLUTION INTRAVENOUS CONTINUOUS
Status: DISCONTINUED | OUTPATIENT
Start: 2025-06-04 | End: 2025-06-04 | Stop reason: HOSPADM

## 2025-06-04 RX ORDER — HEPARIN SODIUM 10000 [USP'U]/100ML
0-5000 INJECTION, SOLUTION INTRAVENOUS CONTINUOUS
Status: DISCONTINUED | OUTPATIENT
Start: 2025-06-04 | End: 2025-06-05

## 2025-06-04 RX ORDER — IOPAMIDOL 755 MG/ML
82 INJECTION, SOLUTION INTRAVASCULAR ONCE
Status: COMPLETED | OUTPATIENT
Start: 2025-06-04 | End: 2025-06-04

## 2025-06-04 RX ORDER — HYDRALAZINE HYDROCHLORIDE 20 MG/ML
10 INJECTION INTRAMUSCULAR; INTRAVENOUS EVERY 4 HOURS PRN
Status: DISCONTINUED | OUTPATIENT
Start: 2025-06-04 | End: 2025-06-04

## 2025-06-04 RX ORDER — LISINOPRIL 20 MG/1
40 TABLET ORAL DAILY
Status: DISCONTINUED | OUTPATIENT
Start: 2025-06-05 | End: 2025-06-05

## 2025-06-04 RX ORDER — ONDANSETRON 4 MG/1
4 TABLET, ORALLY DISINTEGRATING ORAL EVERY 6 HOURS PRN
Status: ACTIVE | OUTPATIENT
Start: 2025-06-04

## 2025-06-04 RX ORDER — OXYCODONE HYDROCHLORIDE 5 MG/1
5 TABLET ORAL EVERY 4 HOURS PRN
Refills: 0 | Status: ACTIVE | OUTPATIENT
Start: 2025-06-04

## 2025-06-04 RX ORDER — CALCIUM CARBONATE 500 MG/1
1000 TABLET, CHEWABLE ORAL 4 TIMES DAILY PRN
Status: ACTIVE | OUTPATIENT
Start: 2025-06-04

## 2025-06-04 RX ORDER — ONDANSETRON 2 MG/ML
4 INJECTION INTRAMUSCULAR; INTRAVENOUS EVERY 30 MIN PRN
Status: DISCONTINUED | OUTPATIENT
Start: 2025-06-04 | End: 2025-06-04 | Stop reason: HOSPADM

## 2025-06-04 RX ORDER — MORPHINE SULFATE 2 MG/ML
2 INJECTION, SOLUTION INTRAMUSCULAR; INTRAVENOUS ONCE
Status: COMPLETED | OUTPATIENT
Start: 2025-06-04 | End: 2025-06-04

## 2025-06-04 RX ORDER — ACETAMINOPHEN 650 MG/1
650 SUPPOSITORY RECTAL EVERY 4 HOURS PRN
Status: ACTIVE | OUTPATIENT
Start: 2025-06-04

## 2025-06-04 RX ORDER — LIDOCAINE 40 MG/G
CREAM TOPICAL
Status: ACTIVE | OUTPATIENT
Start: 2025-06-04

## 2025-06-04 RX ORDER — HYDRALAZINE HYDROCHLORIDE 20 MG/ML
10 INJECTION INTRAMUSCULAR; INTRAVENOUS EVERY 4 HOURS PRN
Status: ACTIVE | OUTPATIENT
Start: 2025-06-04

## 2025-06-04 RX ORDER — HYDROMORPHONE HYDROCHLORIDE 1 MG/ML
0.3 INJECTION, SOLUTION INTRAMUSCULAR; INTRAVENOUS; SUBCUTANEOUS EVERY 6 HOURS PRN
Status: DISPENSED | OUTPATIENT
Start: 2025-06-04

## 2025-06-04 RX ORDER — HYDRALAZINE HYDROCHLORIDE 10 MG/1
10 TABLET, FILM COATED ORAL EVERY 4 HOURS PRN
Status: DISPENSED | OUTPATIENT
Start: 2025-06-04

## 2025-06-04 RX ORDER — AMOXICILLIN 250 MG
1 CAPSULE ORAL DAILY
Status: DISPENSED | OUTPATIENT
Start: 2025-06-04

## 2025-06-04 RX ORDER — PROCHLORPERAZINE MALEATE 5 MG/1
5 TABLET ORAL EVERY 6 HOURS PRN
Status: ACTIVE | OUTPATIENT
Start: 2025-06-04

## 2025-06-04 RX ORDER — POLYETHYLENE GLYCOL 3350 17 G/17G
17 POWDER, FOR SOLUTION ORAL DAILY
Status: DISPENSED | OUTPATIENT
Start: 2025-06-04

## 2025-06-04 RX ORDER — METOCLOPRAMIDE HYDROCHLORIDE 5 MG/ML
5 INJECTION INTRAMUSCULAR; INTRAVENOUS ONCE
Status: COMPLETED | OUTPATIENT
Start: 2025-06-04 | End: 2025-06-04

## 2025-06-04 RX ORDER — DEXTROSE MONOHYDRATE AND SODIUM CHLORIDE 5; .9 G/100ML; G/100ML
INJECTION, SOLUTION INTRAVENOUS CONTINUOUS
Status: DISPENSED | OUTPATIENT
Start: 2025-06-04

## 2025-06-04 RX ORDER — HYDROMORPHONE HYDROCHLORIDE 1 MG/ML
0.5 INJECTION, SOLUTION INTRAMUSCULAR; INTRAVENOUS; SUBCUTANEOUS EVERY 30 MIN PRN
Refills: 0 | Status: DISCONTINUED | OUTPATIENT
Start: 2025-06-04 | End: 2025-06-04 | Stop reason: HOSPADM

## 2025-06-04 RX ORDER — HYDRALAZINE HYDROCHLORIDE 10 MG/1
10 TABLET, FILM COATED ORAL EVERY 4 HOURS PRN
Status: DISCONTINUED | OUTPATIENT
Start: 2025-06-04 | End: 2025-06-04

## 2025-06-04 RX ORDER — METHOCARBAMOL 100 MG/ML
1000 INJECTION, SOLUTION INTRAMUSCULAR; INTRAVENOUS EVERY 8 HOURS PRN
Status: DISPENSED | OUTPATIENT
Start: 2025-06-04 | End: 2025-06-07

## 2025-06-04 RX ORDER — CLONIDINE HYDROCHLORIDE 0.1 MG/1
0.1 TABLET ORAL ONCE
Status: COMPLETED | OUTPATIENT
Start: 2025-06-04 | End: 2025-06-04

## 2025-06-04 RX ORDER — ACETAMINOPHEN 325 MG/1
650 TABLET ORAL EVERY 4 HOURS PRN
Status: DISPENSED | OUTPATIENT
Start: 2025-06-04

## 2025-06-04 RX ORDER — ONDANSETRON 2 MG/ML
4 INJECTION INTRAMUSCULAR; INTRAVENOUS EVERY 6 HOURS PRN
Status: ACTIVE | OUTPATIENT
Start: 2025-06-04

## 2025-06-04 RX ADMIN — HYDRALAZINE HYDROCHLORIDE 10 MG: 10 TABLET ORAL at 18:56

## 2025-06-04 RX ADMIN — SENNOSIDES AND DOCUSATE SODIUM 1 TABLET: 50; 8.6 TABLET ORAL at 15:51

## 2025-06-04 RX ADMIN — SODIUM CHLORIDE 1000 ML: 9 INJECTION, SOLUTION INTRAVENOUS at 17:13

## 2025-06-04 RX ADMIN — DEXTROSE AND SODIUM CHLORIDE: 5; .9 INJECTION, SOLUTION INTRAVENOUS at 20:03

## 2025-06-04 RX ADMIN — CLONIDINE HYDROCHLORIDE 0.1 MG: 0.1 TABLET ORAL at 10:33

## 2025-06-04 RX ADMIN — HEPARIN SODIUM 1450 UNITS/HR: 10000 INJECTION, SOLUTION INTRAVENOUS at 14:43

## 2025-06-04 RX ADMIN — SODIUM CHLORIDE 1000 ML: 9 INJECTION, SOLUTION INTRAVENOUS at 07:20

## 2025-06-04 RX ADMIN — METOCLOPRAMIDE 5 MG: 5 INJECTION, SOLUTION INTRAMUSCULAR; INTRAVENOUS at 09:33

## 2025-06-04 RX ADMIN — METHOCARBAMOL 1000 MG: 100 INJECTION, SOLUTION INTRAMUSCULAR; INTRAVENOUS at 23:53

## 2025-06-04 RX ADMIN — HEPARIN SODIUM 1350 UNITS/HR: 10000 INJECTION, SOLUTION INTRAVENOUS at 21:57

## 2025-06-04 RX ADMIN — PANTOPRAZOLE SODIUM 40 MG: 40 INJECTION, POWDER, FOR SOLUTION INTRAVENOUS at 09:43

## 2025-06-04 RX ADMIN — MORPHINE SULFATE 2 MG: 2 INJECTION, SOLUTION INTRAMUSCULAR; INTRAVENOUS at 14:59

## 2025-06-04 RX ADMIN — IOPAMIDOL 82 ML: 755 INJECTION, SOLUTION INTRAVENOUS at 07:51

## 2025-06-04 RX ADMIN — HYDROMORPHONE HYDROCHLORIDE 0.3 MG: 1 INJECTION, SOLUTION INTRAMUSCULAR; INTRAVENOUS; SUBCUTANEOUS at 20:02

## 2025-06-04 RX ADMIN — HEPARIN SODIUM 1350 UNITS/HR: 10000 INJECTION, SOLUTION INTRAVENOUS at 09:29

## 2025-06-04 RX ADMIN — POLYETHYLENE GLYCOL 3350 17 G: 17 POWDER, FOR SOLUTION ORAL at 15:51

## 2025-06-04 ASSESSMENT — ACTIVITIES OF DAILY LIVING (ADL)
ADLS_ACUITY_SCORE: 42
ADLS_ACUITY_SCORE: 41
ADLS_ACUITY_SCORE: 42
ADLS_ACUITY_SCORE: 41
ADLS_ACUITY_SCORE: 42
ADLS_ACUITY_SCORE: 41
ADLS_ACUITY_SCORE: 42
ADLS_ACUITY_SCORE: 42

## 2025-06-04 ASSESSMENT — COLUMBIA-SUICIDE SEVERITY RATING SCALE - C-SSRS
6. HAVE YOU EVER DONE ANYTHING, STARTED TO DO ANYTHING, OR PREPARED TO DO ANYTHING TO END YOUR LIFE?: NO
1. IN THE PAST MONTH, HAVE YOU WISHED YOU WERE DEAD OR WISHED YOU COULD GO TO SLEEP AND NOT WAKE UP?: NO
2. HAVE YOU ACTUALLY HAD ANY THOUGHTS OF KILLING YOURSELF IN THE PAST MONTH?: NO

## 2025-06-04 NOTE — ED NOTES
[id number]    BP (!) 169/115   Pulse 102   Temp 98  F (36.7  C) (Oral)   Resp 16   SpO2 96%           Neuro: AOx4   Cardiac:  tachy low 100s           Respiratory: non labored on RA. Pt denies SOB   GI/: Distneded bruising from hernia repair procedure  Diet/appetite: Npo ok by Kerri TATE for ice chips  Activity:  Ind/SB due to heparin drip   Pain: denies. States more pressure than pain  Skin: bruising in lower abd. Incision around umbilicus area. Intact no drainage. Bruising in arms     Received from ED around this time. Heparin drip restarted at 1350 due to Xa level it was paused for an hour. Hydralazine is going to be given per BEBETO Manning due to elevated BP. Pt tried to void but was not able to go. Educated to let nursing staff know if this happens. He jsut stated he wanted to try before laying back in bed. Daughter, Elsa, is at bedside.     Plan: Continue to monitor Xa levels and dose accordingly
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
## 2025-06-04 NOTE — ED NOTES
Blood pressure reading 245/141. Adjusted blood pressure cuff on right arm and rechecked. Recheck 194/116. Dr. Connolly informed and states to have patient take his home blood pressure medication that patient reported he had with him. Patient took his Lisinopril 40 mg PO at 0815. Denies any other wants or needs. Daughter at bedside. Call light within reach.

## 2025-06-04 NOTE — MEDICATION SCRIBE - ADMISSION MEDICATION HISTORY
Medication Scribe Admission Medication History    Admission medication history is complete. The information provided in this note is only as accurate as the sources available at the time of the update.    Information Source(s): Patient via in-person    Pertinent Information:   Daughter reported that patient has only one serious medication he is taking and that is lisinopril (ZESTRIL) 40 MG tablet     Take 40 mg by mouth daily.   Changes made to PTA medication list:  Added: None  Deleted: augmented betamethasone dipropionate (DIPROLENE-AF) 0.05 % external cream     Apply topically daily To rash   fluocinonide (LIDEX) 0.05 % external gel     Apply topically daily.   mometasone (ELOCON) 0.1 % external cream     Apply topically daily   polyethylene glycol (MIRALAX) 17 g packet     Take 1 packet by mouth daily   senna-docusate (SENOKOT-S/PERICOLACE) 8.6-50 MG tablet     Take 1 tablet by mouth daily., Di   sildenafil (VIAGRA) 100 MG tablet     Take 0.5-1 tablets ( mg) by mouth daily as needed for erectile dysfunction Take 30 min to 4 hours before intercourse.   oxyCODONE (ROXICODONE) 5 MG tablet     Take 1-2 tablets (5-10 mg) by mouth every 4 hours as needed for moderate to severe pain.,  Patient's daughter reported he is not taking the abovemedications    Changed: None    Allergies reviewed with patient and updates made in EHR: yes    Medication History Completed By: Polina You 6/4/2025 6:36 PM    PTA Med List   Medication Sig Last Dose/Taking    lisinopril (ZESTRIL) 40 MG tablet Take 40 mg by mouth daily. 6/4/2025 Morning

## 2025-06-04 NOTE — ED TRIAGE NOTES
BIBH from OSH with bilateral pulmonary embolisms and bowel obstruction. PT on heparin gtt 1350 units/hr.

## 2025-06-04 NOTE — ED NOTES
Patient presents with c/o nausea and vomiting since yesterday in addition to abdominal pain, following an abdominal hernia repair last Friday 05/30/2025 performed by Dr. Brenner. Abdomen is distended. Excessive gas heard all four quadrants with stethoscope. Gas sounds heard when standing and talking to patient across the room. Last bowel movement was two days ago and patient states it was initially a light, hard bowel movement, but then it was soft and darker. Patient states he has been taking senokot daily, but not the narcotic pain medication given to him. Patient report only taking aleve and tylenol.

## 2025-06-04 NOTE — CONSULTS
[facility] General Surgery Consultation    Gamaliel Johnson MRN# [id number]   YOB: 1955 Age: 69 year old      Date of Admission:  6/4/2025  Date of Consult: 6/4/2025         Assessment and Plan:   Patient is a 69 year old male with who recently underwent an umbilical hernia repair with incidental findings of enlarged periumbilical lymph nodes and omental caking positive for adenocarcinoma on both specimens.   Primary is unknown at this time however CT is highly concerning for a distal pancreatic adenocarcinoma.  Also found to have evidence of distal colonic obstruction versus ileus, ascites, and pulmonary embolism.     PLAN:  Recommend transfer to a higher level of care including GI, oncology, and possibly surgical oncology  Emergency department to begin anticoagulation for treatment of pulmonary embolisms         Requesting Physician:      Dajuan Connolly MD        Chief Complaint:     Chief Complaint   Patient presents with    Post-op Problem          History of Present Illness:   Gamaliel Johnson is a 69 year old male who was seen in consultation at the request of Dr. Connolly who presented to the emergency department with abdominal distention, nausea, and abdominal pain following a open umbilical hernia repair on 5/30/2025 with incidental findings of enlarged periumbilical nodes and omental caking.  Biopsies of these were concerning for adenocarcinoma though I do not believe the final pathology report is available, I was contacted about this by our pathologist yesterday.  Primary is unknown at this time.  Patient was unaware of this new cancer diagnosis prior to presenting to the emergency department as this is a very new finding.  His abdomen has been progressively distended since surgery.  He has not had a bowel movement in 2 days.  He has some significant ecchymosis surrounding his umbilical incision.  He underwent CT scan which demonstrated pulmonary embolism, ascites, colonic  "distention concerning for possible distal colonic obstruction versus ileus, and a large mass in the pancreatic tail concerning for the primary tumor of his metastatic adenocarcinoma.    Of note, the patient prior to surgery complained only of some periumbilical tenderness and did have a small palpable umbilical hernia at the time but did not have any systemic symptoms other than some mild constipation.  Due to the straightforward nature of his umbilical hernia, imaging was not obtained prior to his elective hernia repair.          Physical Exam:   Blood pressure (!) 180/122, pulse 107, temperature 97.3  F (36.3  C), temperature source Tympanic, resp. rate 20, height 1.702 m (5' 7\"), weight 79.4 kg (175 lb), SpO2 96%.  175 lbs 0 oz  General: Awake and alert, pleasant  Psych: Alert and Oriented.  Normal affect  Neurological: grossly intact  Eyes: Sclera clear  Respiratory:  nonlabored breathing  Cardiovascular:  Regular Rate and Rhythm   GI: Moderate distention, mild tenderness to palpation, compressible.  No guarding or rebound tenderness.  Umbilical incision with Dermabond in place, significant ecchymosis surrounding the incision mostly in the dependent region.  Integumentary: Ecchymosis as above         Past Medical History:     Past Medical History:   Diagnosis Date    Compensated HCV cirrhosis (H)     Hepatitis C             Past Surgical History:     Past Surgical History:   Procedure Laterality Date    COLONOSCOPY  1986    COLONOSCOPY N/A 2/19/2015    Procedure: COLONOSCOPY;  Surgeon: Efren Hernandez MD;  Location: [facility] OR    HERNIORRHAPHY UMBILICAL N/A 5/30/2025    Procedure: Open umbilical hernia repair, umbilical mass biopsy and omental biopsy;  Surgeon: Rainer Brenner MD;  Location: [facility] OR    VASECTOMY Bilateral             Current Medications:         Current Facility-Administered Medications   Medication Dose Route Frequency Provider Last Rate Last Admin    pantoprazole (PROTONIX) injection 40 mg  40 " mg Intravenous Once Dajuan Connolly MD           Current Facility-Administered Medications   Medication Dose Route Frequency Provider Last Rate Last Admin    HYDROmorphone (PF) (DILAUDID) injection 0.5 mg  0.5 mg Intravenous Q30 Min PRN Dajuan Connolly MD        ondansetron (ZOFRAN) injection 4 mg  4 mg Intravenous Q30 Min PRN Dajuan Connolly MD                Home Medications:     Prior to Admission medications    Medication Sig Last Dose Taking? Auth Provider Long Term End Date   senna-docusate (SENOKOT-S/PERICOLACE) 8.6-50 MG tablet Take 1 tablet by mouth daily. 6/4/2025 Morning Yes Rainer Brenner MD     augmented betamethasone dipropionate (DIPROLENE-AF) 0.05 % external cream Apply topically daily To rash   NEIL Valverde MD     fluocinonide (LIDEX) 0.05 % external gel Apply topically daily.   Reported, Patient     lisinopril (ZESTRIL) 40 MG tablet Take 40 mg by mouth daily.   Reported, Patient Yes    mometasone (ELOCON) 0.1 % external cream Apply topically daily  Patient not taking: Reported on 1/28/2022   NEIL Valverde MD     oxyCODONE (ROXICODONE) 5 MG tablet Take 1-2 tablets (5-10 mg) by mouth every 4 hours as needed for moderate to severe pain. Unknown  Rainer Brenner MD     polyethylene glycol (MIRALAX) 17 g packet Take 1 packet by mouth daily.   Reported, Patient     sildenafil (VIAGRA) 100 MG tablet Take 0.5-1 tablets ( mg) by mouth daily as needed for erectile dysfunction Take 30 min to 4 hours before intercourse.  Never use with nitroglycerin, terazosin or doxazosin.   Venice Antony, NP Yes             Allergies:     Allergies   Allergen Reactions    Sulfa Antibiotics             Family History:     Family History   Problem Relation Age of Onset    Hypertension Father     Hypertension Mother            Social History:   Gamaliel Johnson  reports that he has never smoked. He has quit using smokeless tobacco.  His smokeless tobacco use included chew. He reports current alcohol  use. He reports current drug use. Drug: Marijuana.          Review of Systems:   The 10 point Review of Systems is negative other than noted in the HPI.         Labs/Imaging   All new lab and imaging data was reviewed.   I have personally reviewed the imaging studies    Latest Reference Range & Units 06/04/25 06:43 06/04/25 07:03 06/04/25 10:55   Sodium 135 - 145 mmol/L 130 (L)     Potassium 3.4 - 5.3 mmol/L 4.1     Chloride 98 - 107 mmol/L 93 (L)     Carbon Dioxide (CO2) 22 - 29 mmol/L 21 (L)     Urea Nitrogen 8.0 - 23.0 mg/dL 8.8     Creatinine 0.67 - 1.17 mg/dL 0.84     GFR Estimate >60 mL/min/1.73m2 >90     Calcium 8.8 - 10.4 mg/dL 10.2     Anion Gap 7 - 15 mmol/L 16 (H)     Albumin 3.5 - 5.2 g/dL 4.0     Protein Total 6.4 - 8.3 g/dL 7.8     Alkaline Phosphatase 40 - 150 U/L 79     ALT 0 - 70 U/L 18     AST 0 - 45 U/L 21     Bilirubin Direct 0.00 - 0.30 mg/dL 0.19     Bilirubin Total <=1.2 mg/dL 0.5     CRP Inflammation <5.00 mg/L 95.99 (H)     Glucose 70 - 99 mg/dL 161 (H)     Lactic Acid 0.7 - 2.0 mmol/L  1.5    Lipase 13 - 60 U/L 41     WBC 4.0 - 11.0 10e3/uL 11.4 (H)     Hemoglobin 13.3 - 17.7 g/dL 15.6     Hematocrit 40.0 - 53.0 % 44.9     Platelet Count 150 - 450 10e3/uL 244     RBC Count 4.40 - 5.90 10e6/uL 5.09     MCV 78 - 100 fL 88     MCH 26.5 - 33.0 pg 30.6     MCHC 31.5 - 36.5 g/dL 34.7     RDW 10.0 - 15.0 % 12.9     % Neutrophils % 85     % Lymphocytes % 7     % Monocytes % 6     % Eosinophils % 0     % Basophils % 0     % Immature Granulocytes % 0     NRBC/W <1 /100 0     Absolute Neutrophil 1.6 - 8.3 10e3/uL 9.7 (H)     Absolute Lymphocytes 0.8 - 5.3 10e3/uL 0.8     Absolute Monocytes 0.0 - 1.3 10e3/uL 0.7     Absolute Eosinophils 0.0 - 0.7 10e3/uL 0.0     Absolute Basophils 0.0 - 0.2 10e3/uL 0.0     Absolute Immature Granulocytes <=0.4 10e3/uL 0.1     Absolute NRBCs 10e3/uL 0.0     INR 0.85 - 1.15  1.36 (H)     PT 11.8 - 14.8 Seconds 16.9 (H)     ABO/Rh(D)  O POS     Antibody Screen Negative   Negative     SPECIMEN EXPIRATION DATE  6/7/2025 11:59:00 PM CDT     Color Urine Colorless, Straw, Light Yellow, Yellow    Light Yellow   Appearance Urine Clear    Clear   Glucose Urine Negative mg/dL   Negative   Bilirubin Urine Negative    Negative   Ketones Urine Negative mg/dL   60 !   Specific Gravity Urine 1.003 - 1.035    1.010   pH Urine 4.7 - 8.0    6.0   Protein Albumin Urine Negative mg/dL   Negative   Urobilinogen mg/dL Normal mg/dL   Normal   Nitrite Urine Negative    Negative   Blood Urine Negative    Negative   Leukocyte Esterase Urine Negative    Negative   WBC Urine <=5 /HPF   2   RBC Urine <=2 /HPF   1   Squamous Epithelial /HPF Urine <=1 /HPF   0   Mucus Urine None Seen /LPF   Present !   Hyaline Casts <=2 /LPF   5 (H)   (L): Data is abnormally low  (H): Data is abnormally high  !: Data is abnormal    Narrative & Impression   PROCEDURE:  CT CHEST/ABDOMEN/PELVIS W CONTRAST.       HISTORY:  69 years Male recent umbilical hernia repair, bx positive  adenocarcinoma, distended abdominal pain,       TECHNIQUE:  Helical CT of the chest, abdomen and pelvis was performed  using non-ionic intravenous contrast. This CT exam was performed using  one or more the following dose reduction techniques: automated  exposure control, adjustment of the mA and/or kV according to patient  size, and/or iterative reconstruction technique.     COMPARISON:  7/5/2020     MEDS/CONTRAST: ISOVUE 370  82mL     FINDINGS:       Multiple segmental and subsegmental pulmonary emboli are seen in the  right lower lobe. There is groundglass opacity in the right lower lobe  suspicious for an evolving infarct.     The heart size is normal. No evidence of right heart strain is seen.  The mid ascending thoracic aorta is dilated to 4.0 cm. No abnormal  thoracic adenopathy is identified.     Mild perihepatic predominant ascites is seen. Omental and mesenteric  nodularity is present diffusely, with the dominant component anterior  to the  transverse colon measuring 2.6 cm.     The small bowel is normal in caliber. There is some dilation of the  colon until relatively focal transition in the proximal sigmoid colon.     Ill-defined soft tissue density is seen at the umbilicus. No drainable  fluid collection is seen.     A distal pancreatic mass is suggested, measuring 3.2 x 3.9 cm.     Symmetric nephrograms are present without hydronephrosis. The aorta is  normal in size with scattered atherosclerotic calcifications.     Osteopenia. Multifocal mild vertebral body height loss.                                                                      IMPRESSION:       Multiple right lower lobe pulmonary emboli. Probable evolving right  lower lobe pulmonary infarct.     Ascites. Mesenteric nodularity most compatible with carcinomatosis.     Probable 3.9 cm distal pancreatic mass versus peripancreatic  metastatic disease. Consider MR abdomen WWO plus MRCP for further  assessment.     Question focal transition in caliber within the proximal sigmoid  colon, which may relate to serosal metastatic disease or primary  neoplasm. Consider follow-up colonoscopy.     MD Rainer PATHAK MD
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
## 2025-06-04 NOTE — PROGRESS NOTES
"Transfer Type: [facility]  Transfer Triage Note    Date of call: 06/04/25  Time of call: 9:41 AM    Current Patient Location:  [facility]  Current Level of Care: ED  Referring Provider: Dr. Connolly    Vitals: Temp: 97.3  F (36.3  C) Temp src: Tympanic BP: (!) 180/122 Pulse: 107   Resp: 20 SpO2: 96 % Height: 170.2 cm (5' 7\") Weight: 79.4 kg (175 lb)  O2 Device: None (Room air) at    Diagnosis: Small Bowel Obstruction, Adenocarcinoma  Reason for requested transfer: Further diagnostic work up, management, and consultation for specialized care   Isolation Needs: None    Care everywhere has been updated and reviewed: N/A  Necessary images have been sent through PACS: N/A    If patient is transferring for specialty care or specific procedure, the specialist required has participated in the transfer call and agreed with need for transfer and anticipated timeline: Yes, Provider name: [place] specialty with: Surgical Oncology    Transfer accepted: Yes but no bed available. Given need for rapid assessment/intervention, arranged for ED to ED transfer with plan for Medicine Admission on Arrival.   Stability of Patient: Patient is vitally stable, with no critical labs, and will likely remain stable throughout the transfer process  Does the patient require placement on the [place] [facility] of [facility]? Yes. What specific [place] needs are anticipated? Surgical Oncology and potential GI procedure  Level of Care Needed: Med Surg  Telemetry Needed:  None  Expected Time of Arrival for Transfer: 0-8 hours  Arrival Location:  [facility]     Recommendations for Management and Stabilization: see below    Additional Comments:   69 year old man. Recent umbilical hernia surgery 5/30 with discovery of abdominal masses. Biopsied and reportedly noted to be adenocarcinoma (official path read still pending). Presented to the ER today with 2 day history of vomiting, " abdominal pain and distension.     CT shows partial obstruction at the sigmoid colon, carcinomatosis and multiple masses including pancreatic involvement. Also showed multiple RLL subsegmental PEs and evolving Pulmonary infarcts.     Comfortable with dilaudid, zofran, reglan. On heparin drip. BP high but had not taken his medications. Giving it now.     Per Dr. Mata with Surgical Oncology. Place NG if ongoing vomiting and distension. Unlikely to be a surgical candidate but they are happy to see him when he arrives. He recommends:   admission to medicine for treatment of PE/pulmonary infarct  Heme/Onc consultation  Surgical oncology consultation  GI consultation for possible stenting.     No bed available for Direct Admission. Given need for rapid assessment/intervention, arranged for ED to ED transfer with plan for Medicine Admission on Arrival.       To notify the admitting provider that the patient has arrived at their destination:    For [facility]: Identify the correct provider on Am[employer]: [facility] HOSPITALIST SERVICE/[place]/ [facility]/ ADULT [facility] then find title hospitals ED ADMISSIONS AND INTERNAL [facility] TRANSFERS [[id number]]. Use [employer] to contact the provider listed there.      Joshua Valerio MD

## 2025-06-04 NOTE — ED TRIAGE NOTES
Patient presenst s/p umbilical hernia repair approximately a week ago.   C/O N/V, ABD cramping, and bloating. ABD is distended.  He has a follow-up scheduled for later to day but couldn't wait.   A&Ox4.   Last BM x2 days ago.   Poor appetite.

## 2025-06-04 NOTE — CONSULTS
"Surgical Oncology Consult Note   Surgical Oncology Staff: Dr. Jovany Leiva  Requesting Staff: Dr. Brooks  Date and Time: 06/04/25 4:48 PM     CC: \"I had this hernia fixed and it came back as cancer\"    HPI:   Gamaliel is a 68 yo M with PMH of hepatitis C, HTN, tobacco use (chew), EtOH use disorder, possible EtOH withdrawal seizures (on keppra intermittently). He had an umbilical hernia repair on 05/30/2025 with Dr. Brenner  - there was nodularity of the fascia and preperitoneal fat, as well as omental caking, concerning for malignancy. Biopsies of the nodules were sent for pathology and returned as moderately differentiated adenocarcinoma CK7+ consistent with upper GI or HPB primary. The pathology was discussed with the patient today when it resulted.   Last night he complained of abdominal bloating, had some nausea and vomiting after eating a banana and Sprite. This morning he felt worse and presented to the [facility] ED, where a CT C/A/P was done revealing multiple subsegmental pulmonary emboli of the right lower lobe, perihepatic ascites, omental and mesenteric nodularity, a distal pancreatic mass 3.9 cm, and a transition point in the distal sigmoid colon where there is some thickening. Not on the CT read, but the proximal colon is filled with stool and dilated, and the small bowel is non-dilated, concerning for potential closed loop obstruction secondary to competent ileocecal valve.   Given these findings, he was transferred to [facility] for further workup and treatment. He was started on a heparin gtt.   Here he reports his nausea and vomiting has resolved. He states the last time he passed gas was a couple days ago. Last bowel movement was on Sunday. No fevers or chills. No shortness of breath. Last colonoscopy was 10 years ago (February 2015) - no polyps, had diverticulosis.     Medical Hx:   HTN  EtOH use disorder (remission)  EtOH related seizure  Dupuytren contractures bilateral hands  Hepatitis C (cleared after " "treatment)     Surgical Hx:   Umbilical hernia repair  Ankle fracture repairs    Family Hx:   Uncle  of colon cancer at a young age  No family history of bleeding / bruising / clotting disorders / adverse reactions to anesthesia    Medications:   Keppra (takes this prn at bedtime)   Lisinopril  Sildenafil    Allergies:   Sulfa ABX    Social Hx:   Lives in a home alone (in [place])  , close with ex wife. Daughter lives in [facility]  Retired from work as an  in the mines   Active in work around the house  Former chew tobacco use  Former EtOH use up to 8 drinks daily, last drink was on   Remote history cocaine use    Physical Exam:   Vital signs:  Temp: 98.7  F (37.1  C) Temp src: Oral BP: (!) 165/109 Pulse: 100   Resp: 18 SpO2: 96 % O2 Device: None (Room air)        Estimated body mass index is 27.41 kg/m  as calculated from the following:    Height as of an earlier encounter on 25: 1.702 m (5' 7\").    Weight as of an earlier encounter on 25: 79.4 kg (175 lb).    Resting in bed, appears comfortable, daughter at bedside  HR regular by radial pulse  Respirations non-labored on room air, no accessory muscle use  Neck with no cervical lymphadenopathy  Abdomen protuberant, mild tenderness throughout, worse on right than left. Umbilical hernia incision with dermabond in place, no erythema or induration. There is an evolving bruise over the lower abdomen  Extremities thin, warm, well perfused, no inguinal lymphadenopathy     Labs:     BMP: K 4.1, Na 130, Cl 93, CO2 21, BUN 8.8, Cr 0.84    CBC: WBC 13, Hgb 15.3, Plt 270    LFT: AST/ALT 21/18, alk phos 79, PT/INR 16.9 / 1.36    Imaging:   CT C/A/P today   IMPRESSION:    Multiple right lower lobe pulmonary emboli. Probable evolving right  lower lobe pulmonary infarct.  Ascites. Mesenteric nodularity most compatible with carcinomatosis.  Probable 3.9 cm distal pancreatic mass versus peripancreatic  metastatic disease. Consider MR " abdomen WWO plus MRCP for further  assessment.  Question focal transition in caliber within the proximal sigmoid  colon, which may relate to serosal metastatic disease or primary  neoplasm. Consider follow-up colonoscopy.    Assessment:   Gamaliel is a 68 yo M who presents with colonic obstruction in the setting of recently diagnosed metastatic upper GI cancer. On imaging this appears to be most likely a pancreas primary with ascites and carcinomatosis. He requires colonic decompression, and would benefit from completion of cancer workup. Unfortunately there is no surgical solution for his likely pancreas cancer. Would focus on managing colonic obstruction.     Plan:   - NPO, place NGT if has further emesis  - Colonic stenting (discussed with GI they plan to do this tomorrow)   - EUS with biopsies for pancreas mass  - Add on tumor markers (CA 19-9, CEA)   - Oncology consult     Discussed with Dr. Cali Owusu MD PGY5  [facility] General Surgery Resident
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
## 2025-06-04 NOTE — CONSULTS
Gastroenterology Consultation  GI Advanced Endoscopy/Pancreaticobiliary Service    Date of Admission:  6/4/2025  Date of Consult  6/4/2025   Reason for consult:     new adenocarcinoma, concern for panc primary, bowel obstruction may be due to carcinomatosis - consideration for stent      Requesting Physician:  Kenan Brooks DO  PATIENT:  Gamaliel Johnson  MRN:         [id number]          ASSESSMENT AND RECOMMENDATIONS:   Assessment:  Gamaliel Johnson is a 69 year old male who was admitted on 6/4/2025 for N/V, abdominal distension, and lack of BM or passing gas since 6/2/25.  Patient had undergone open umbilical hernia repair on 5/30/25.  There was nodularity, induration and omental caking; no immediate path was available the hernia sample and omental biopsy were sent and today they returned as adenocarcinoma of unknown origin.  CT c/a/p shows new PE and possible pancreatic mass, and bowel obstruction around the sigmoid colon.  Patient was transferred from [place], [place] for surg onc, GI, and oncology evaluation.   Patient is clinically stable and well appearing, although does get a little nauseated if he tries to eat or drink anything.  Overall good functional status.     Patient has PMH of HTN, treated HCV, AUD drinking about 6 pack a day for many years, and marijuana use.  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  Daughter is involved and lives in the [facility].  No FH of pancreatic cancer, uncle with colon cancer.    # Large bowel obstruction, with focal transition around sigmoid colon, likely malignant from adenocarcinoma of unknown origin  # Concern for peritoneal carcinomatosis  # Distal pancreatic mass, 3x4cm  # New PE, RLL, segmental, with possible infarct    Recommendations:  - Discussed with surg-onc and recommend stent placement.  Will proceed with stent placement tomorrow.  - Plan for EUS at the same anesthesia for evaluation of pancreas and possible FNA.     - NPO and IVFs  per primary / surgery team  - Continue heparin drip  - Appreciate medical and surgical oncology recs.    Thank you for involving us in this patient's care. Please do not hesitate to contact the GI service with any questions or concerns.  The patient was discussed and plan agreed upon with Dr. Barber.    Abel Sherman DO, MS ([place])  Gastroenterology Fellow  [facility]  Text Page          History of Present Illness:   Patient seen and examined at 5pm. History is obtained from patient, daughter, and chart review.    Gamaliel Johnson is a 69 year old male who was admitted on 6/4/2025 for N/V, abdominal distension, and lack of BM or passing gas since 6/2/25.  Patient had undergone open umbilical hernia repair on 5/30/25.  There was nodularity, induration and omental caking; no immediate path was available the hernia sample and omental biopsy were sent and today they returned as adenocarcinoma of unknown origin.  CT c/a/p shows new PE and possible pancreatic mass, and bowel obstruction around the sigmoid colon.  Patient was transferred from [place], [place] for surg onc, GI, and oncology evaluation.   Patient is clinically stable and well appearing, although does get a little nauseated if he tries to eat or drink anything.     Patient has PMH of HTN, treated HCV, AUD drinking about 6 pack a day for many years, and marijuana use.  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  Daughter is involved and lives in the [facility].           Past Medical History:   Reviewed            Past Surgical History:   Reviewed            Social History:   Reviewed           Family History:   Patient's family history is reviewed today and is non-contributory  - No pancreatic cancer  - Uncle with colon cancer         Allergies:   Reviewed         Medications:   Reviewed         Review of Systems:     A review of systems was performed and is negative except as noted in the HPI            Physical Exam:   Temp: 98.7  F (37.1  C) Temp src: Oral BP: (!) 165/109 Pulse: 100   Resp: 18 SpO2: 96 % O2 Device: None (Room air)          General Appearance: NAD, pleasant  HEENT: EOMI  Respiratory: Breathing comfortably on RA  Cardiovascular: Mildly tachycardidc  GI: soft, distended non-tender, surgical site c/d/i, no peritoneal sign  Extremities: No LE edema noted   Neuro: Moving all extremities   Skin: No jaundice rash on exposed areas   Psych: Alert and oriented, appropriate mood and affect, speech coherent / logical    _______________________________________________________________  Data:  Labs and imaging for last 24 hours were independently reviewed and interpreted

## 2025-06-04 NOTE — TELEPHONE ENCOUNTER
"Patient is post-operative day 4 following umbilical hernia repair on 5/30. He began experiencing vomiting yesterday afternoon, with a total of 4-5 episodes. He reports the presence of \"some red\" in the emesis, is unable to tolerate oral intake, and feels weak and slightly dizzy, though he remains ambulatory.    Plan:  Recommended evaluation in the ED.  FNA advised patient to proceed to the ED. Patient stated he has arranged transportation, and FNA agreed with this plan.    Ayah Isaac RN/[place] Nurse Advisor      Reason for Disposition   [1] Vomiting AND [2] contains red blood or black (\"coffee ground\") material  (Exception: Few red streaks in vomit that only happened once.)   [1] MODERATE vomiting (e.g., 3 - 5 times/day) AND [2] age > 60 years    Additional Information   Negative: Shock suspected (e.g., cold/pale/clammy skin, too weak to stand, low BP, rapid pulse)   Negative: Difficult to awaken or acting confused (e.g., disoriented, slurred speech)   Negative: Sounds like a life-threatening emergency to the triager   Negative: Vomiting occurs only while coughing   Negative: [1] Pregnant < 20 Weeks AND [2] nausea/vomiting began in early pregnancy (i.e., 4-8 weeks pregnant)   Negative: Chest pain   Negative: Headache is main symptom   Negative: Vomiting (or Nausea) in a cancer patient who is currently (or recently) receiving chemotherapy or radiation therapy, or cancer patient who has metastatic or end-stage cancer and is receiving palliative care   Negative: Severe pain in one eye   Negative: Recent head injury (within last 3 days)   Negative: Recent abdominal injury (within last 3 days)   Negative: [1] Insulin-dependent diabetes (Type I) AND [2] glucose > 400 mg/dl (22 mmol/l)   Negative: [1] Vomiting AND [2] hernia is more painful or swollen than usual   Negative: [1] SEVERE vomiting (e.g., 6 or more times/day) AND [2] present > 8 hours (Exception: Patient sounds well, is drinking liquids, does not sound " dehydrated, and vomiting has lasted less than 24 hours.)    Protocols used: Vomiting-A-AH

## 2025-06-04 NOTE — ED PROVIDER NOTES
[place] EMERGENCY DEPARTMENT ([facility])    6/04/25       ED PROVIDER NOTE     History     Chief Complaint   Patient presents with    Abnormal Labs     HPI  Gamaliel Johnson is a 69 year old male with a history of hypertension, alcohol dependence in remission, alcohol seizure, chronic hepatitis C, umbilical hernia s/p repair on 5/30/25, who presents to the ED for evaluation of pulmonary embolism. Per chart review, patient underwent open umbilical hernia repair 5/30/25. Patient had umbilical mass that was biopsied and result came back positive for adenocarcinoma. Patient had an appointment to follow-up with the surgeon, but was instead seen in the [facility] ED earlier today due to 2 days of abdominal pain/distension and vomiting. Patient did not have a bowel movement yesterday or today. He denied any fever, hematochezia, or melena. CT Chest, abdomen, pelvis was done at that time and patient was found to have multiple segmental and subsegmental pulmonary emboli in the right lower lobe. Groundglass opacity was also visualized in the right lower lobe suspicious for an evolving infarct. Patient was transferred to [facility] ED for further treatment. In the ED here, patient states he has very mild abdominal pain. The patient did not take his lisinopril this morning.  Denies any chest pain or shortness of breath.    PROCEDURE: CT CHEST/ABDOMEN/PELVIS W CONTRAST.   IMPRESSION:    Multiple right lower lobe pulmonary emboli. Probable evolving right  lower lobe pulmonary infarct.  Ascites. Mesenteric nodularity most compatible with carcinomatosis.  Probable 3.9 cm distal pancreatic mass versus peripancreatic  metastatic disease. Consider MR abdomen WWO plus MRCP for further  assessment.  Question focal transition in caliber within the proximal sigmoid  colon, which may relate to serosal metastatic disease or primary  neoplasm. Consider follow-up colonoscopy.     Past Medical History  Past Medical History:   Diagnosis  Date    Compensated HCV cirrhosis (H)     Hepatitis C      Past Surgical History:   Procedure Laterality Date    COLONOSCOPY  1986    COLONOSCOPY N/A 2/19/2015    Procedure: COLONOSCOPY;  Surgeon: Efren Hernandez MD;  Location: [facility] OR    HERNIORRHAPHY UMBILICAL N/A 5/30/2025    Procedure: Open umbilical hernia repair, umbilical mass biopsy and omental biopsy;  Surgeon: Rainer Brenner MD;  Location: [facility] OR    VASECTOMY Bilateral      augmented betamethasone dipropionate (DIPROLENE-AF) 0.05 % external cream  fluocinonide (LIDEX) 0.05 % external gel  lisinopril (ZESTRIL) 40 MG tablet  mometasone (ELOCON) 0.1 % external cream  oxyCODONE (ROXICODONE) 5 MG tablet  polyethylene glycol (MIRALAX) 17 g packet  senna-docusate (SENOKOT-S/PERICOLACE) 8.6-50 MG tablet  sildenafil (VIAGRA) 100 MG tablet      Allergies   Allergen Reactions    Sulfa Antibiotics      Family History  Family History   Problem Relation Age of Onset    Hypertension Father     Hypertension Mother      Social History   Social History     Tobacco Use    Smoking status: Never    Smokeless tobacco: Former     Types: Chew    Tobacco comments:     declined quit plan   Vaping Use    Vaping status: Never Used   Substance Use Topics    Alcohol use: Yes     Comment: cut down to once a week    Drug use: Yes     Types: Marijuana     Comment: occasional      ROS: 14 point ROS neg other than the symptoms noted above in the HPI.     Physical Exam      Physical Exam  Physical Exam   Constitutional: oriented to person, place, and time. appears well-developed and well-nourished.   HENT:   Head: Normocephalic and atraumatic.   Neck: Normal range of motion.   Pulmonary/Chest: Effort normal. No respiratory distress.   Cardiac: No murmurs, rubs, gallops. RRR.  Abdominal: Abdomen soft, nontender, nondistended. No rebound tenderness.  MSK: Long bones without deformity or evidence of trauma  Neurological: alert and oriented to person, place, and time.   Skin: Skin is warm  and dry.   Psychiatric:  normal mood and affect.  behavior is normal. Thought content normal.         ED Course, Procedures, & Data      Procedures            Results for orders placed or performed during the hospital encounter of 06/04/25   CT Chest/Abdomen/Pelvis w Contrast     Status: None    Narrative    PROCEDURE:  CT CHEST/ABDOMEN/PELVIS W CONTRAST.      HISTORY:  69 years Male recent umbilical hernia repair, bx positive  adenocarcinoma, distended abdominal pain,      TECHNIQUE:  Helical CT of the chest, abdomen and pelvis was performed  using non-ionic intravenous contrast. This CT exam was performed using  one or more the following dose reduction techniques: automated  exposure control, adjustment of the mA and/or kV according to patient  size, and/or iterative reconstruction technique.    COMPARISON:  7/5/2020    MEDS/CONTRAST: ISOVUE 370  82mL    FINDINGS:      Multiple segmental and subsegmental pulmonary emboli are seen in the  right lower lobe. There is groundglass opacity in the right lower lobe  suspicious for an evolving infarct.    The heart size is normal. No evidence of right heart strain is seen.  The mid ascending thoracic aorta is dilated to 4.0 cm. No abnormal  thoracic adenopathy is identified.    Mild perihepatic predominant ascites is seen. Omental and mesenteric  nodularity is present diffusely, with the dominant component anterior  to the transverse colon measuring 2.6 cm.    The small bowel is normal in caliber. There is some dilation of the  colon until relatively focal transition in the proximal sigmoid colon.    Ill-defined soft tissue density is seen at the umbilicus. No drainable  fluid collection is seen.    A distal pancreatic mass is suggested, measuring 3.2 x 3.9 cm.    Symmetric nephrograms are present without hydronephrosis. The aorta is  normal in size with scattered atherosclerotic calcifications.    Osteopenia. Multifocal mild vertebral body height loss.      Impression     IMPRESSION:      Multiple right lower lobe pulmonary emboli. Probable evolving right  lower lobe pulmonary infarct.    Ascites. Mesenteric nodularity most compatible with carcinomatosis.    Probable 3.9 cm distal pancreatic mass versus peripancreatic  metastatic disease. Consider MR abdomen WWO plus MRCP for further  assessment.    Question focal transition in caliber within the proximal sigmoid  colon, which may relate to serosal metastatic disease or primary  neoplasm. Consider follow-up colonoscopy.    DESIREE COTTON MD         SYSTEM ID:  [id number]   [facility] Draw     Status: None    Narrative    The following orders were created for panel order [facility] Draw.  Procedure                               Abnormality         Status                     ---------                               -----------         ------                     Extra Blue Top Tube[[id number]]                             Final result               Extra Red Top Tube[[id number]]                              Final result               Extra Green Top (Lithiu...[[id number]]                                                 Extra Green Top (Lithiu...[[id number]]                                                 Extra Purple Top Tube[[id number]]                                                        Please view results for these tests on the individual orders.   Basic metabolic panel     Status: Abnormal   Result Value Ref Range    Sodium 130 (L) 135 - 145 mmol/L    Potassium 4.1 3.4 - 5.3 mmol/L    Chloride 93 (L) 98 - 107 mmol/L    Carbon Dioxide (CO2) 21 (L) 22 - 29 mmol/L    Anion Gap 16 (H) 7 - 15 mmol/L    Urea Nitrogen 8.8 8.0 - 23.0 mg/dL    Creatinine 0.84 0.67 - 1.17 mg/dL    GFR Estimate >90 >60 mL/min/1.73m2    Calcium 10.2 8.8 - 10.4 mg/dL    Glucose 161 (H) 70 - 99 mg/dL   CRP inflammation     Status: Abnormal   Result Value Ref Range    CRP Inflammation 95.99 (H) <5.00 mg/L   Extra Blue Top Tube     Status: None   Result Value Ref  Range    Hold Specimen [facility]    Extra Red Top Tube     Status: None   Result Value Ref Range    Hold Specimen [facility]    CBC with platelets and differential     Status: Abnormal   Result Value Ref Range    WBC Count 11.4 (H) 4.0 - 11.0 10e3/uL    RBC Count 5.09 4.40 - 5.90 10e6/uL    Hemoglobin 15.6 13.3 - 17.7 g/dL    Hematocrit 44.9 40.0 - 53.0 %    MCV 88 78 - 100 fL    MCH 30.6 26.5 - 33.0 pg    MCHC 34.7 31.5 - 36.5 g/dL    RDW 12.9 10.0 - 15.0 %    Platelet Count 244 150 - 450 10e3/uL    % Neutrophils 85 %    % Lymphocytes 7 %    % Monocytes 6 %    % Eosinophils 0 %    % Basophils 0 %    % Immature Granulocytes 0 %    NRBCs per 100 WBC 0 <1 /100    Absolute Neutrophils 9.7 (H) 1.6 - 8.3 10e3/uL    Absolute Lymphocytes 0.8 0.8 - 5.3 10e3/uL    Absolute Monocytes 0.7 0.0 - 1.3 10e3/uL    Absolute Eosinophils 0.0 0.0 - 0.7 10e3/uL    Absolute Basophils 0.0 0.0 - 0.2 10e3/uL    Absolute Immature Granulocytes 0.1 <=0.4 10e3/uL    Absolute NRBCs 0.0 10e3/uL   Lactic Acid Whole Blood with 1X Repeat in 2 HR when >2     Status: Normal   Result Value Ref Range    Lactic Acid, Initial 1.5 0.7 - 2.0 mmol/L   [facility] Draw     Status: None    Narrative    The following orders were created for panel order [facility] Draw.  Procedure                               Abnormality         Status                     ---------                               -----------         ------                     Extra Green Top (Lithiu...[[id number]]                      Final result                 Please view results for these tests on the individual orders.   Extra Green Top (Lithium Heparin) Tube     Status: None   Result Value Ref Range    Hold Specimen [facility]    Hepatic function panel     Status: Normal   Result Value Ref Range    Protein Total 7.8 6.4 - 8.3 g/dL    Albumin 4.0 3.5 - 5.2 g/dL    Bilirubin Total 0.5 <=1.2 mg/dL    Alkaline Phosphatase 79 40 - 150 U/L    AST 21 0 - 45 U/L    ALT 18 0 - 70 U/L    Bilirubin Direct 0.19 0.00 - 0.30 mg/dL    Lipase     Status: Normal   Result Value Ref Range    Lipase 41 13 - 60 U/L   UA with Microscopic reflex to Culture     Status: Abnormal    Specimen: Urine, NOS   Result Value Ref Range    Color Urine Light Yellow Colorless, Straw, Light Yellow, Yellow    Appearance Urine Clear Clear    Glucose Urine Negative Negative mg/dL    Bilirubin Urine Negative Negative    Ketones Urine 60 (A) Negative mg/dL    Specific Gravity Urine 1.010 1.003 - 1.035    Blood Urine Negative Negative    pH Urine 6.0 4.7 - 8.0    Protein Albumin Urine Negative Negative mg/dL    Urobilinogen Urine Normal Normal mg/dL    Nitrite Urine Negative Negative    Leukocyte Esterase Urine Negative Negative    Mucus Urine Present (A) None Seen /LPF    RBC Urine 1 <=2 /HPF    WBC Urine 2 <=5 /HPF    Squamous Epithelials Urine 0 <=1 /HPF    Hyaline Casts Urine 5 (H) <=2 /LPF    Narrative    Urine Culture not indicated   INR     Status: Abnormal   Result Value Ref Range    INR 1.36 (H) 0.85 - 1.15    PT 16.9 (H) 11.8 - 14.8 Seconds   EKG 12-lead, tracing only     Status: None (Preliminary result)   Result Value Ref Range    Systolic Blood Pressure  mmHg    Diastolic Blood Pressure  mmHg    Ventricular Rate 107 BPM    Atrial Rate 107 BPM    ID Interval 206 ms    QRS Duration 76 ms     ms    QTc 461 ms    P Axis 39 degrees    R AXIS -13 degrees    T Axis 37 degrees    Interpretation ECG       Sinus tachycardia  Otherwise normal ECG  No previous ECGs available     Adult Type and Screen     Status: None   Result Value Ref Range    ABO/RH(D) O POS     Antibody Screen Negative Negative    SPECIMEN EXPIRATION DATE 6/7/2025 11:59:00 PM CDT    CBC with platelets differential     Status: Abnormal    Narrative    The following orders were created for panel order CBC with platelets differential.  Procedure                               Abnormality         Status                     ---------                               -----------         ------                      CBC with platelets and ...[[id number]]  Abnormal            Final result                 Please view results for these tests on the individual orders.   ABO/Rh type and screen     Status: None    Narrative    The following orders were created for panel order ABO/Rh type and screen.  Procedure                               Abnormality         Status                     ---------                               -----------         ------                     Adult Type and Screen[[id number]]                           Final result                 Please view results for these tests on the individual orders.     Medications   heparin 25,000 units in 0.45% NaCl 250 mL ANTICOAGULANT infusion (has no administration in time range)     Labs Ordered and Resulted from Time of ED Arrival to Time of ED Departure - No data to display  No orders to display          Critical care was not performed.     Medical Decision Making  The patient's presentation was of high complexity (an acute health issue posing potential threat to life or bodily function).    The patient's evaluation involved:  review of external note(s) from 1 sources (emergency department note from earlier today)  discussion of management or test interpretation with another health professional (hospitalist, outside emergency physician)    The patient's management necessitated high risk (a decision regarding hospitalization).    Assessment & Plan    Patient presenting from [facility] with pulmonary embolism and small bowel obstruction.  Patient here is stable with minimal symptoms.  He will be admitted to medicine for further care.  Heparin reordered    I have reviewed the nursing notes. I have reviewed the findings, diagnosis, plan and need for follow up with the patient.    New Prescriptions    No medications on file       Final diagnoses:   Other pulmonary embolism without acute cor pulmonale, unspecified chronicity (H)   INuria, am serving as a  trained medical scribe to document services personally performed by Henri Bates MD, based on the provider's statements to me.     I, Henri Bates MD, was physically present and have reviewed and verified the accuracy of this note documented by Nuria Johnson.      Henri Bates MD   [facility] EMERGENCY DEPARTMENT  6/4/2025     Henri Bates MD  06/04/25 1509

## 2025-06-04 NOTE — ED NOTES
Bed: UUEDH-H  Expected date:   Expected time:   Means of arrival:   Comments:  [place] transfer

## 2025-06-04 NOTE — ED NOTES
Face to face report given with opportunity to observe patient.    Report given to TIANNA Hunter.     Leticia Thompson RN   6/4/2025  9:10 AM

## 2025-06-05 ENCOUNTER — ANESTHESIA (OUTPATIENT)
Dept: SURGERY | Facility: CLINIC | Age: 70
End: 2025-06-05
Payer: COMMERCIAL

## 2025-06-05 ENCOUNTER — TRANSCRIBE ORDERS (OUTPATIENT)
Dept: OTHER | Age: 70
End: 2025-06-05

## 2025-06-05 ENCOUNTER — ANESTHESIA EVENT (OUTPATIENT)
Dept: SURGERY | Facility: CLINIC | Age: 70
End: 2025-06-05
Payer: COMMERCIAL

## 2025-06-05 VITALS
RESPIRATION RATE: 18 BRPM | TEMPERATURE: 98.6 F | SYSTOLIC BLOOD PRESSURE: 157 MMHG | OXYGEN SATURATION: 93 % | HEART RATE: 103 BPM | DIASTOLIC BLOOD PRESSURE: 90 MMHG

## 2025-06-05 DIAGNOSIS — C48.2 PRIMARY PERITONEAL ADENOCARCINOMA (H): Primary | ICD-10-CM

## 2025-06-05 LAB
ALBUMIN SERPL BCG-MCNC: 3.6 G/DL (ref 3.5–5.2)
ALP SERPL-CCNC: 69 U/L (ref 40–150)
ALT SERPL W P-5'-P-CCNC: 11 U/L (ref 0–70)
ANION GAP SERPL CALCULATED.3IONS-SCNC: 12 MMOL/L (ref 7–15)
AST SERPL W P-5'-P-CCNC: 20 U/L (ref 0–45)
BILIRUB SERPL-MCNC: 0.5 MG/DL
BUN SERPL-MCNC: 12.5 MG/DL (ref 8–23)
CALCIUM SERPL-MCNC: 9.2 MG/DL (ref 8.8–10.4)
CHLORIDE SERPL-SCNC: 102 MMOL/L (ref 98–107)
COLONOSCOPY: NORMAL
CREAT SERPL-MCNC: 0.71 MG/DL (ref 0.67–1.17)
EGFRCR SERPLBLD CKD-EPI 2021: >90 ML/MIN/1.73M2
ERYTHROCYTE [DISTWIDTH] IN BLOOD BY AUTOMATED COUNT: 13.2 % (ref 10–15)
GLUCOSE BLDC GLUCOMTR-MCNC: 161 MG/DL (ref 70–99)
GLUCOSE SERPL-MCNC: 199 MG/DL (ref 70–99)
HCO3 SERPL-SCNC: 22 MMOL/L (ref 22–29)
HCT VFR BLD AUTO: 41.7 % (ref 40–53)
HGB BLD-MCNC: 14.2 G/DL (ref 13.3–17.7)
LMWH PPP CHRO-ACNC: 0.39 IU/ML (ref ?–2)
LVEF ECHO: NORMAL
MAGNESIUM SERPL-MCNC: 2 MG/DL (ref 1.7–2.3)
MCH RBC QN AUTO: 30.4 PG (ref 26.5–33)
MCHC RBC AUTO-ENTMCNC: 34.1 G/DL (ref 31.5–36.5)
MCV RBC AUTO: 89 FL (ref 78–100)
NT-PROBNP SERPL-MCNC: 448 PG/ML (ref 0–229)
PLATELET # BLD AUTO: 299 10E3/UL (ref 150–450)
POTASSIUM SERPL-SCNC: 3.8 MMOL/L (ref 3.4–5.3)
PROT SERPL-MCNC: 6.9 G/DL (ref 6.4–8.3)
RBC # BLD AUTO: 4.67 10E6/UL (ref 4.4–5.9)
SODIUM SERPL-SCNC: 136 MMOL/L (ref 135–145)
TROPONIN T SERPL HS-MCNC: 70 NG/L
TROPONIN T SERPL HS-MCNC: 70 NG/L
UFH PPP CHRO-ACNC: 0.39 IU/ML (ref ?–1.1)
UFH PPP CHRO-ACNC: 0.52 IU/ML (ref ?–1.1)
WBC # BLD AUTO: 15.4 10E3/UL (ref 4–11)

## 2025-06-05 PROCEDURE — 250N000009 HC RX 250: Performed by: NURSE ANESTHETIST, CERTIFIED REGISTERED

## 2025-06-05 PROCEDURE — 710N000010 HC RECOVERY PHASE 1, LEVEL 2, PER MIN: Performed by: INTERNAL MEDICINE

## 2025-06-05 PROCEDURE — 88172 CYTP DX EVAL FNA 1ST EA SITE: CPT | Mod: 26 | Performed by: PATHOLOGY

## 2025-06-05 PROCEDURE — 99207 PR APP CREDIT; MD BILLING SHARED VISIT: CPT | Performed by: PHYSICIAN ASSISTANT

## 2025-06-05 PROCEDURE — 85520 HEPARIN ASSAY: CPT | Performed by: STUDENT IN AN ORGANIZED HEALTH CARE EDUCATION/TRAINING PROGRAM

## 2025-06-05 PROCEDURE — 85041 AUTOMATED RBC COUNT: CPT

## 2025-06-05 PROCEDURE — 80053 COMPREHEN METABOLIC PANEL: CPT

## 2025-06-05 PROCEDURE — 255N000002 HC RX 255 OP 636: Performed by: INTERNAL MEDICINE

## 2025-06-05 PROCEDURE — 85014 HEMATOCRIT: CPT

## 2025-06-05 PROCEDURE — 04CR0ZZ EXTIRPATION OF MATTER FROM RIGHT POSTERIOR TIBIAL ARTERY, OPEN APPROACH: ICD-10-PCS | Performed by: INTERNAL MEDICINE

## 2025-06-05 PROCEDURE — 84484 ASSAY OF TROPONIN QUANT: CPT | Performed by: PHYSICIAN ASSISTANT

## 2025-06-05 PROCEDURE — 999N000141 HC STATISTIC PRE-PROCEDURE NURSING ASSESSMENT: Performed by: INTERNAL MEDICINE

## 2025-06-05 PROCEDURE — C1876 STENT, NON-COA/NON-COV W/DEL: HCPCS | Performed by: INTERNAL MEDICINE

## 2025-06-05 PROCEDURE — 04CK0ZZ EXTIRPATION OF MATTER FROM RIGHT FEMORAL ARTERY, OPEN APPROACH: ICD-10-PCS | Performed by: INTERNAL MEDICINE

## 2025-06-05 PROCEDURE — 272N000001 HC OR GENERAL SUPPLY STERILE: Performed by: INTERNAL MEDICINE

## 2025-06-05 PROCEDURE — 88305 TISSUE EXAM BY PATHOLOGIST: CPT | Mod: 26 | Performed by: PATHOLOGY

## 2025-06-05 PROCEDURE — 88305 TISSUE EXAM BY PATHOLOGIST: CPT | Mod: TC | Performed by: INTERNAL MEDICINE

## 2025-06-05 PROCEDURE — 0D7N8DZ DILATION OF SIGMOID COLON WITH INTRALUMINAL DEVICE, VIA NATURAL OR ARTIFICIAL OPENING ENDOSCOPIC: ICD-10-PCS | Performed by: INTERNAL MEDICINE

## 2025-06-05 PROCEDURE — 250N000025 HC SEVOFLURANE, PER MIN: Performed by: INTERNAL MEDICINE

## 2025-06-05 PROCEDURE — 258N000003 HC RX IP 258 OP 636

## 2025-06-05 PROCEDURE — 36415 COLL VENOUS BLD VENIPUNCTURE: CPT | Performed by: PHYSICIAN ASSISTANT

## 2025-06-05 PROCEDURE — 360N000076 HC SURGERY LEVEL 3, PER MIN: Performed by: INTERNAL MEDICINE

## 2025-06-05 PROCEDURE — 0KNS0ZZ RELEASE RIGHT LOWER LEG MUSCLE, OPEN APPROACH: ICD-10-PCS | Performed by: INTERNAL MEDICINE

## 2025-06-05 PROCEDURE — C1769 GUIDE WIRE: HCPCS | Performed by: INTERNAL MEDICINE

## 2025-06-05 PROCEDURE — C1726 CATH, BAL DIL, NON-VASCULAR: HCPCS | Performed by: INTERNAL MEDICINE

## 2025-06-05 PROCEDURE — 83735 ASSAY OF MAGNESIUM: CPT

## 2025-06-05 PROCEDURE — 88173 CYTOPATH EVAL FNA REPORT: CPT | Mod: TC | Performed by: INTERNAL MEDICINE

## 2025-06-05 PROCEDURE — 04CM0ZZ EXTIRPATION OF MATTER FROM RIGHT POPLITEAL ARTERY, OPEN APPROACH: ICD-10-PCS | Performed by: INTERNAL MEDICINE

## 2025-06-05 PROCEDURE — 370N000017 HC ANESTHESIA TECHNICAL FEE, PER MIN: Performed by: INTERNAL MEDICINE

## 2025-06-05 PROCEDURE — 120N000002 HC R&B MED SURG/OB UMMC

## 2025-06-05 PROCEDURE — 047C0DZ DILATION OF RIGHT COMMON ILIAC ARTERY WITH INTRALUMINAL DEVICE, OPEN APPROACH: ICD-10-PCS | Performed by: INTERNAL MEDICINE

## 2025-06-05 PROCEDURE — 99233 SBSQ HOSP IP/OBS HIGH 50: CPT | Mod: FS | Performed by: INTERNAL MEDICINE

## 2025-06-05 PROCEDURE — 0DBN8ZX EXCISION OF SIGMOID COLON, VIA NATURAL OR ARTIFICIAL OPENING ENDOSCOPIC, DIAGNOSTIC: ICD-10-PCS | Performed by: INTERNAL MEDICINE

## 2025-06-05 PROCEDURE — 88173 CYTOPATH EVAL FNA REPORT: CPT | Mod: 26 | Performed by: PATHOLOGY

## 2025-06-05 PROCEDURE — C1889 IMPLANT/INSERT DEVICE, NOC: HCPCS | Performed by: INTERNAL MEDICINE

## 2025-06-05 PROCEDURE — 250N000011 HC RX IP 250 OP 636: Performed by: NURSE ANESTHETIST, CERTIFIED REGISTERED

## 2025-06-05 PROCEDURE — 36415 COLL VENOUS BLD VENIPUNCTURE: CPT

## 2025-06-05 PROCEDURE — 83880 ASSAY OF NATRIURETIC PEPTIDE: CPT | Performed by: PHYSICIAN ASSISTANT

## 2025-06-05 PROCEDURE — 258N000003 HC RX IP 258 OP 636: Performed by: NURSE ANESTHETIST, CERTIFIED REGISTERED

## 2025-06-05 PROCEDURE — 85520 HEPARIN ASSAY: CPT | Performed by: PHYSICIAN ASSISTANT

## 2025-06-05 PROCEDURE — 3E043XZ INTRODUCTION OF VASOPRESSOR INTO CENTRAL VEIN, PERCUTANEOUS APPROACH: ICD-10-PCS | Performed by: INTERNAL MEDICINE

## 2025-06-05 PROCEDURE — 85018 HEMOGLOBIN: CPT

## 2025-06-05 PROCEDURE — 0FDG3ZX EXTRACTION OF PANCREAS, PERCUTANEOUS APPROACH, DIAGNOSTIC: ICD-10-PCS | Performed by: INTERNAL MEDICINE

## 2025-06-05 DEVICE — UN-COVERED SELF-EXPANDING NITINOL COLON STENT
Type: IMPLANTABLE DEVICE | Site: SIGMOID COLON | Status: FUNCTIONAL
Brand: HANAROSTENT® LOWAXTM COLON/RECTUM(NNN)

## 2025-06-05 RX ORDER — ONDANSETRON 4 MG/1
4 TABLET, ORALLY DISINTEGRATING ORAL EVERY 30 MIN PRN
Status: ACTIVE | OUTPATIENT
Start: 2025-06-05

## 2025-06-05 RX ORDER — OXYCODONE HYDROCHLORIDE 5 MG/1
5 TABLET ORAL
Status: ACTIVE | OUTPATIENT
Start: 2025-06-05

## 2025-06-05 RX ORDER — HEPARIN SODIUM 10000 [USP'U]/100ML
1350 INJECTION, SOLUTION INTRAVENOUS CONTINUOUS
Status: DISPENSED | OUTPATIENT
Start: 2025-06-05

## 2025-06-05 RX ORDER — FENTANYL CITRATE 50 UG/ML
50 INJECTION, SOLUTION INTRAMUSCULAR; INTRAVENOUS EVERY 5 MIN PRN
Status: ACTIVE | OUTPATIENT
Start: 2025-06-05

## 2025-06-05 RX ORDER — PROPOFOL 10 MG/ML
INJECTION, EMULSION INTRAVENOUS PRN
Status: DISCONTINUED | OUTPATIENT
Start: 2025-06-05 | End: 2025-06-05

## 2025-06-05 RX ORDER — SENNOSIDES 8.6 MG
1-2 TABLET ORAL 2 TIMES DAILY PRN
Status: ACTIVE | OUTPATIENT
Start: 2025-06-05

## 2025-06-05 RX ORDER — SODIUM CHLORIDE, SODIUM LACTATE, POTASSIUM CHLORIDE, CALCIUM CHLORIDE 600; 310; 30; 20 MG/100ML; MG/100ML; MG/100ML; MG/100ML
INJECTION, SOLUTION INTRAVENOUS CONTINUOUS
Status: ACTIVE | OUTPATIENT
Start: 2025-06-05

## 2025-06-05 RX ORDER — FENTANYL CITRATE 50 UG/ML
25 INJECTION, SOLUTION INTRAMUSCULAR; INTRAVENOUS EVERY 5 MIN PRN
Status: ACTIVE | OUTPATIENT
Start: 2025-06-05

## 2025-06-05 RX ORDER — LIDOCAINE 40 MG/G
CREAM TOPICAL
Status: DISCONTINUED | OUTPATIENT
Start: 2025-06-05 | End: 2025-06-05 | Stop reason: HOSPADM

## 2025-06-05 RX ORDER — LIDOCAINE HYDROCHLORIDE 20 MG/ML
INJECTION, SOLUTION INFILTRATION; PERINEURAL PRN
Status: DISCONTINUED | OUTPATIENT
Start: 2025-06-05 | End: 2025-06-05

## 2025-06-05 RX ORDER — SODIUM CHLORIDE, SODIUM LACTATE, POTASSIUM CHLORIDE, CALCIUM CHLORIDE 600; 310; 30; 20 MG/100ML; MG/100ML; MG/100ML; MG/100ML
INJECTION, SOLUTION INTRAVENOUS CONTINUOUS PRN
Status: DISCONTINUED | OUTPATIENT
Start: 2025-06-05 | End: 2025-06-05

## 2025-06-05 RX ORDER — NALOXONE HYDROCHLORIDE 0.4 MG/ML
0.1 INJECTION, SOLUTION INTRAMUSCULAR; INTRAVENOUS; SUBCUTANEOUS
Status: ACTIVE | OUTPATIENT
Start: 2025-06-05

## 2025-06-05 RX ORDER — IOPAMIDOL 510 MG/ML
INJECTION, SOLUTION INTRAVASCULAR PRN
Status: DISCONTINUED | OUTPATIENT
Start: 2025-06-05 | End: 2025-06-05 | Stop reason: HOSPADM

## 2025-06-05 RX ORDER — HYDROMORPHONE HCL IN WATER/PF 6 MG/30 ML
0.4 PATIENT CONTROLLED ANALGESIA SYRINGE INTRAVENOUS EVERY 5 MIN PRN
Status: ACTIVE | OUTPATIENT
Start: 2025-06-05

## 2025-06-05 RX ORDER — POLYETHYLENE GLYCOL 3350 17 G/17G
17 POWDER, FOR SOLUTION ORAL DAILY PRN
Status: ACTIVE | OUTPATIENT
Start: 2025-06-05

## 2025-06-05 RX ORDER — FENTANYL CITRATE 50 UG/ML
INJECTION, SOLUTION INTRAMUSCULAR; INTRAVENOUS PRN
Status: DISCONTINUED | OUTPATIENT
Start: 2025-06-05 | End: 2025-06-05

## 2025-06-05 RX ORDER — OXYCODONE HYDROCHLORIDE 10 MG/1
10 TABLET ORAL
Status: ACTIVE | OUTPATIENT
Start: 2025-06-05

## 2025-06-05 RX ORDER — ONDANSETRON 2 MG/ML
4 INJECTION INTRAMUSCULAR; INTRAVENOUS EVERY 30 MIN PRN
Status: ACTIVE | OUTPATIENT
Start: 2025-06-05

## 2025-06-05 RX ORDER — HYDROMORPHONE HCL IN WATER/PF 6 MG/30 ML
0.2 PATIENT CONTROLLED ANALGESIA SYRINGE INTRAVENOUS EVERY 5 MIN PRN
Status: ACTIVE | OUTPATIENT
Start: 2025-06-05

## 2025-06-05 RX ORDER — DEXAMETHASONE SODIUM PHOSPHATE 4 MG/ML
4 INJECTION, SOLUTION INTRA-ARTICULAR; INTRALESIONAL; INTRAMUSCULAR; INTRAVENOUS; SOFT TISSUE
Status: ACTIVE | OUTPATIENT
Start: 2025-06-05

## 2025-06-05 RX ORDER — ONDANSETRON 2 MG/ML
INJECTION INTRAMUSCULAR; INTRAVENOUS PRN
Status: DISCONTINUED | OUTPATIENT
Start: 2025-06-05 | End: 2025-06-05

## 2025-06-05 RX ORDER — SODIUM CHLORIDE, SODIUM LACTATE, POTASSIUM CHLORIDE, CALCIUM CHLORIDE 600; 310; 30; 20 MG/100ML; MG/100ML; MG/100ML; MG/100ML
INJECTION, SOLUTION INTRAVENOUS CONTINUOUS
Status: DISCONTINUED | OUTPATIENT
Start: 2025-06-05 | End: 2025-06-05 | Stop reason: HOSPADM

## 2025-06-05 RX ADMIN — PHENYLEPHRINE HYDROCHLORIDE 150 MCG: 10 INJECTION INTRAVENOUS at 07:43

## 2025-06-05 RX ADMIN — DEXTROSE AND SODIUM CHLORIDE: 5; .9 INJECTION, SOLUTION INTRAVENOUS at 11:07

## 2025-06-05 RX ADMIN — PROPOFOL 30 MG: 10 INJECTION, EMULSION INTRAVENOUS at 08:49

## 2025-06-05 RX ADMIN — PHENYLEPHRINE HYDROCHLORIDE 150 MCG: 10 INJECTION INTRAVENOUS at 08:34

## 2025-06-05 RX ADMIN — FENTANYL CITRATE 100 MCG: 50 INJECTION INTRAMUSCULAR; INTRAVENOUS at 07:17

## 2025-06-05 RX ADMIN — MIDAZOLAM 1 MG: 1 INJECTION INTRAMUSCULAR; INTRAVENOUS at 07:17

## 2025-06-05 RX ADMIN — LIDOCAINE HYDROCHLORIDE 100 MG: 20 INJECTION, SOLUTION INFILTRATION; PERINEURAL at 07:22

## 2025-06-05 RX ADMIN — HUMAN ALBUMIN MICROSPHERES AND PERFLUTREN 6 ML: 10; .22 INJECTION, SOLUTION INTRAVENOUS at 16:01

## 2025-06-05 RX ADMIN — PHENYLEPHRINE HYDROCHLORIDE 150 MCG: 10 INJECTION INTRAVENOUS at 07:53

## 2025-06-05 RX ADMIN — PHENYLEPHRINE HYDROCHLORIDE 100 MCG: 10 INJECTION INTRAVENOUS at 07:46

## 2025-06-05 RX ADMIN — PROPOFOL 30 MG: 10 INJECTION, EMULSION INTRAVENOUS at 07:57

## 2025-06-05 RX ADMIN — Medication 50 MG: at 09:11

## 2025-06-05 RX ADMIN — PHENYLEPHRINE HYDROCHLORIDE 150 MCG: 10 INJECTION INTRAVENOUS at 07:34

## 2025-06-05 RX ADMIN — Medication 50 MG: at 07:22

## 2025-06-05 RX ADMIN — DEXTROSE AND SODIUM CHLORIDE: 5; .9 INJECTION, SOLUTION INTRAVENOUS at 20:15

## 2025-06-05 RX ADMIN — SODIUM CHLORIDE, SODIUM LACTATE, POTASSIUM CHLORIDE, AND CALCIUM CHLORIDE: .6; .31; .03; .02 INJECTION, SOLUTION INTRAVENOUS at 07:45

## 2025-06-05 RX ADMIN — PHENYLEPHRINE HYDROCHLORIDE 200 MCG: 10 INJECTION INTRAVENOUS at 07:28

## 2025-06-05 RX ADMIN — Medication 50 MG: at 09:14

## 2025-06-05 RX ADMIN — PHENYLEPHRINE HYDROCHLORIDE 100 MCG: 10 INJECTION INTRAVENOUS at 07:31

## 2025-06-05 RX ADMIN — PROPOFOL 100 MG: 10 INJECTION, EMULSION INTRAVENOUS at 07:22

## 2025-06-05 RX ADMIN — ONDANSETRON 4 MG: 2 INJECTION INTRAMUSCULAR; INTRAVENOUS at 09:12

## 2025-06-05 ASSESSMENT — ACTIVITIES OF DAILY LIVING (ADL)
ADLS_ACUITY_SCORE: 44
ADLS_ACUITY_SCORE: 66
ADLS_ACUITY_SCORE: 44

## 2025-06-05 NOTE — BRIEF OP NOTE
[facility] Brief Operative Note    Pre-operative diagnosis: Malignant tumor of intestine (H) [C26.0]  Pancreatic mass [K86.89]   Post-operative diagnosis * No post-op diagnosis entered *   Procedure: Procedure(s):  COLONOSCOPY, WITH COLONIC STENT INSERTION  ESOPHAGOGASTRODUODENOSCOPY, WITH FINE NEEDLE ASPIRATION BIOPSY, WITH ENDOSCOPIC ULTRASOUND GUIDANCE   Surgeon: Guru Elisabeth MD   Assistants(s):    Estimated blood loss: None    Specimens: None   Findings:       Colon mass seen    A 25 mm by 12 cm Colon stent at 50 cm    EUS    Pancreas Tail mass seen. EUS FNB positive for poorly differentiated ca    Peritoneal implant s/p EUS FNB    Recommendation    Resume anticoagulation

## 2025-06-05 NOTE — CONSULTS
Discharge Pharmacy Test Claim    Patient has pharmacy benefits through [employer] Medicare advantage plan. Patient has $534.07 remaining in the drug deductible. Eliquis or xarelto is covered with an initial copay of $581.07. Once the drug deductible is met with the first fill of either DOAC, copays reduce to $47 per month. [facility] pharmacy has one-time use 30-day free trial vouchers available for eliquis or xarelto.    Test Claim Initial Copay Copay after Drug Deductible is met   eliquis 581.07 47.00   xarelto 581.07 47.00     Jennifer Fernandez  [facility] Pharmacy Liaison, [place]  Ph: [phone]  Fax: [phone]  Available on Teams and Vocera  Disclaimer: Pharmacy test claims are estimates and may not reflect final costs. Suggested alternatives aim to be cost-effective and may not be therapeutically equivalent. This consult is informational and does not constitute medical advice. Clinical decisions should be made by qualified healthcare providers.

## 2025-06-05 NOTE — ANESTHESIA PREPROCEDURE EVALUATION
Anesthesia Pre-Procedure Evaluation    Patient: Gamaliel Johnson   MRN: [id number] : 1955          Procedure : Procedure(s):  COLONOSCOPY, WITH COLONIC STENT INSERTION, ENDOSCOPIC ULTRASOUND, ESOPHAGOSCOPY / UPPER GASTROINTESTINAL TRACT (GI)         Past Medical History:   Diagnosis Date    Compensated HCV cirrhosis (H)     Hepatitis C       Past Surgical History:   Procedure Laterality Date    COLONOSCOPY      COLONOSCOPY N/A 2015    Procedure: COLONOSCOPY;  Surgeon: Efren Hernandez MD;  Location: [facility] OR    HERNIORRHAPHY UMBILICAL N/A 2025    Procedure: Open umbilical hernia repair, umbilical mass biopsy and omental biopsy;  Surgeon: Rainer Brenner MD;  Location: [facility] OR    VASECTOMY Bilateral       Allergies   Allergen Reactions    Sulfa Antibiotics       Social History     Tobacco Use    Smoking status: Never    Smokeless tobacco: Former     Types: Chew    Tobacco comments:     declined quit plan   Substance Use Topics    Alcohol use: Yes     Comment: cut down to once a week      Wt Readings from Last 1 Encounters:   25 79.4 kg (175 lb)        Anesthesia Evaluation   Pt has had prior anesthetic. Type: General.        ROS/MED HX  ENT/Pulmonary:       Neurologic:       Cardiovascular:     (+)  hypertension- -   -  - -                                      METS/Exercise Tolerance:     Hematologic:       Musculoskeletal:       GI/Hepatic:     (+)           hepatitis  liver disease,       Renal/Genitourinary:       Endo:       Psychiatric/Substance Use:       Infectious Disease:       Malignancy:       Other:              Physical Exam  Airway  Mallampati: II  Neck ROM: limited    Cardiovascular - normal exam   Dental   (+) Modest Abnormalities - crowns, retainers, 1 or 2 missing teeth      Pulmonary - normal exam      Neurological   He appears awake.    Other Findings       OUTSIDE LABS:  CBC:   Lab Results   Component Value Date    WBC 13.0 (H) 2025    WBC 11.4 (H) 2025     HGB 15.3 06/04/2025    HGB 15.6 06/04/2025    HCT 44.1 06/04/2025    HCT 44.9 06/04/2025     06/04/2025     06/04/2025     BMP:   Lab Results   Component Value Date     (L) 06/04/2025     01/31/2022    POTASSIUM 4.1 06/04/2025    POTASSIUM 4.1 01/31/2022    CHLORIDE 93 (L) 06/04/2025    CHLORIDE 105 01/31/2022    CO2 21 (L) 06/04/2025    CO2 26 01/31/2022    BUN 8.8 06/04/2025    BUN 8 01/31/2022    CR 0.84 06/04/2025    CR 0.93 01/31/2022     (H) 06/04/2025     (H) 01/31/2022     COAGS:   Lab Results   Component Value Date    INR 1.36 (H) 06/04/2025     POC:   Lab Results   Component Value Date     (H) 07/05/2020     HEPATIC:   Lab Results   Component Value Date    ALBUMIN 4.0 06/04/2025    PROTTOTAL 7.8 06/04/2025    ALT 18 06/04/2025    AST 21 06/04/2025    ALKPHOS 79 06/04/2025    BILITOTAL 0.5 06/04/2025     OTHER:   Lab Results   Component Value Date    LACT 1.5 06/04/2025    A1C 6.0 (H) 06/04/2025    ELÍAS 10.2 06/04/2025    MAG 2.4 (H) 04/08/2021    LIPASE 41 06/04/2025    TSH 1.44 04/08/2021       Anesthesia Plan    ASA Status:  3      NPO Status: NPO Appropriate   Anesthesia Type: General.  Airway: oral.  Induction: intravenous.  Maintenance: Balanced.   Techniques and Equipment:     - Airway:  Planned airway equipment includes video laryngoscope.     - Monitoring Plan: standard ASA monitoring     Consents    Anesthesia Plan(s) and associated risks, benefits, and realistic alternatives discussed. Questions answered and patient/representative(s) expressed understanding.     - Discussed: anesthesiologist     - Discussed with:  Patient        - Pt is DNR/DNI Status: no DNR     Blood Consent:      - Discussed with: patient.     - Consented: consented to blood products     Postoperative Care    Pain management: plan for postoperative opioid use.     Comments:                   Denton Graves MD    I have reviewed the pertinent notes and labs in the chart from the  "past 30 days and (re)examined the patient.  Any updates or changes from those notes are reflected in this note.    Clinically Significant Risk Factors Present on Admission         # Hyponatremia: Lowest Na = 130 mmol/L in last 2 days, will monitor as appropriate  # Hypochloremia: Lowest Cl = 93 mmol/L in last 2 days, will monitor as appropriate       # Coagulation Defect: INR = 1.36 (Ref range: 0.85 - 1.15) and/or PTT = N/A, will monitor for bleeding    # Hypertension: Noted on problem list           # Overweight: Estimated body mass index is 27.41 kg/m  as calculated from the following:    Height as of an earlier encounter on 6/4/25: 1.702 m (5' 7\").    Weight as of an earlier encounter on 6/4/25: 79.4 kg (175 lb).                    "

## 2025-06-05 NOTE — PROVIDER NOTIFICATION
Pt endorsing intermittent cramping pain in R abdomen 9/10. Gave dilaudid earlier which he said did not help the pain. Pt does not want to take oral meds because anything oral gives him hiccups and worsens the pain. Anything else we can try?

## 2025-06-05 NOTE — ANESTHESIA PROCEDURE NOTES
Airway       Patient location during procedure: OR       Procedure Start/Stop Times: 6/5/2025 7:22 AM and 6/5/2025 7:24 AM  Staff -        CRNA: Russell Laughlin APRN CRNA       Performed By: CRNA  Consent for Airway        Urgency: elective  Indications and Patient Condition       Indications for airway management: monika-procedural       Induction type:intravenous       Mask difficulty assessment: 1 - vent by mask    Final Airway Details       Final airway type: endotracheal airway       Successful airway: ETT - single  Endotracheal Airway Details        ETT size (mm): 7.5       Cuffed: yes       Successful intubation technique: direct laryngoscopy       DL Blade Type: MAC 3       Grade View of Cords: 1       Adjucts: stylet       Position: Right       Measured from: gums/teeth       Secured at (cm): 23       Bite block used: None    Post intubation assessment        Placement verified by: capnometry, equal breath sounds and chest rise        Number of attempts at approach: 1       Number of other approaches attempted: 0       Secured with: tape       Ease of procedure: easy       Dentition: Intact    Medication(s) Administered   Medication Administration Time: 6/5/2025 7:22 AM

## 2025-06-05 NOTE — ANESTHESIA POSTPROCEDURE EVALUATION
Patient: Gamaliel Johnson    Procedure: Procedure(s):  COLONOSCOPY, WITH COLONIC STENT INSERTION  ESOPHAGOGASTRODUODENOSCOPY, WITH FINE NEEDLE ASPIRATION BIOPSY, WITH ENDOSCOPIC ULTRASOUND GUIDANCE       Anesthesia Type:  General    Note:  Disposition: Inpatient   Postop Pain Control: Uneventful            Sign Out: Well controlled pain   PONV: No   Neuro/Psych: Uneventful            Sign Out: Acceptable/Baseline neuro status   Airway/Respiratory: Uneventful            Sign Out: Acceptable/Baseline resp. status   CV/Hemodynamics: Uneventful            Sign Out: Acceptable CV status; No obvious hypovolemia; No obvious fluid overload   Other NRE: NONE   DID A NON-ROUTINE EVENT OCCUR? No       Last vitals:  Vitals Value Taken Time   /76 06/05/25 10:15   Temp 36.4  C (97.6  F) 06/05/25 09:24   Pulse 109 06/05/25 10:28   Resp 32 06/05/25 10:15   SpO2 93 % 06/05/25 10:28   Vitals shown include unfiled device data.    Electronically Signed By: Denton Graves MD  June 5, 2025  10:29 AM

## 2025-06-05 NOTE — OR NURSING
Paged Gregoria Mccloud for a diet order.    Gregoria Mccloud requested we verify diet order with Dr. Barber once the EUS has been completed.   Paged  for the EUS report and diet order.

## 2025-06-05 NOTE — PROGRESS NOTES
Michael Barber: Gamaliel Johnson [facility] 6. Heparin gtt stopped at 0555. can pt proceed with surgery at 0730. [place] [phone]

## 2025-06-05 NOTE — PROVIDER NOTIFICATION
Pt stated he has not urinated since the AM and has been having trouble voiding. Got him up and attempted to go with no success. Bladder scan showed 262 ml, will continue to monitor

## 2025-06-05 NOTE — ANESTHESIA CARE TRANSFER NOTE
Patient: Gamaliel Johnson    Procedure: Procedure(s):  COLONOSCOPY, WITH COLONIC STENT INSERTION  ESOPHAGOGASTRODUODENOSCOPY, WITH FINE NEEDLE ASPIRATION BIOPSY, WITH ENDOSCOPIC ULTRASOUND GUIDANCE       Diagnosis: Malignant tumor of intestine (H) [C26.0]  Pancreatic mass [K86.89]  Diagnosis Additional Information: No value filed.    Anesthesia Type:   General     Note:    Oropharynx: oropharynx clear of all foreign objects  Level of Consciousness: awake  Oxygen Supplementation: nasal cannula  Level of Supplemental Oxygen (L/min / FiO2): 3 LPM  Independent Airway: airway patency satisfactory and stable  Dentition: dentition unchanged  Vital Signs Stable: post-procedure vital signs reviewed and stable  Report to RN Given: handoff report given  Patient transferred to: PACU    Handoff Report: Identifed the Patient, Identified the Reponsible Provider, Reviewed the pertinent medical history, Discussed the surgical course, Reviewed Intra-OP anesthesia mangement and issues during anesthesia, Set expectations for post-procedure period and Allowed opportunity for questions and acknowledgement of understanding      Vitals:  Vitals Value Taken Time   /85 06/05/25 09:26   Temp     Pulse 110 06/05/25 09:27   Resp 34 06/05/25 09:27   SpO2 93 % 06/05/25 09:27   Vitals shown include unfiled device data.    Electronically Signed By: MALDONADO Benavidez CRNA  June 5, 2025  9:28 AM
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
## 2025-06-05 NOTE — PLAN OF CARE
Neuro: A&Ox4.   Cardiac: ST. HTN resolved w/ prn hydralazine   Respiratory: Sating 95 on RA.  GI/: 325 output. No BM or passing gas   Diet/appetite: Tolerating NPO diet.   Activity: SBA, up to chair and in halls.  Pain: Dilaudid and robaxin for pain   Skin: No new deficits noted.Abdomen distended and purple brusing, umbilical incision  LDA's: LPIV SL, heparin drip stopped for procedure per orders. RPIV D5NS 100ml/hr    Plan: Sent to pre op for stent placement. Continue with POC. Notify primary team with changes.    BP (!) 146/92   Pulse 105   Temp 98  F (36.7  C) (Oral)   Resp 16   SpO2 95%

## 2025-06-06 LAB
ANION GAP SERPL CALCULATED.3IONS-SCNC: 10 MMOL/L (ref 7–15)
ATRIAL RATE - MUSE: 107 BPM
BUN SERPL-MCNC: 13.1 MG/DL (ref 8–23)
CALCIUM SERPL-MCNC: 8.5 MG/DL (ref 8.8–10.4)
CANCER AG19-9 SERPL IA-ACNC: 4 U/ML
CHLORIDE SERPL-SCNC: 106 MMOL/L (ref 98–107)
CREAT SERPL-MCNC: 0.69 MG/DL (ref 0.67–1.17)
DIASTOLIC BLOOD PRESSURE - MUSE: NORMAL MMHG
EGFRCR SERPLBLD CKD-EPI 2021: >90 ML/MIN/1.73M2
ERYTHROCYTE [DISTWIDTH] IN BLOOD BY AUTOMATED COUNT: 13.5 % (ref 10–15)
ERYTHROCYTE [DISTWIDTH] IN BLOOD BY AUTOMATED COUNT: 13.5 % (ref 10–15)
GLUCOSE BLDC GLUCOMTR-MCNC: 119 MG/DL (ref 70–99)
GLUCOSE BLDC GLUCOMTR-MCNC: 130 MG/DL (ref 70–99)
GLUCOSE SERPL-MCNC: 132 MG/DL (ref 70–99)
HCO3 SERPL-SCNC: 21 MMOL/L (ref 22–29)
HCT VFR BLD AUTO: 34 % (ref 40–53)
HCT VFR BLD AUTO: 36.5 % (ref 40–53)
HGB BLD-MCNC: 11.3 G/DL (ref 13.3–17.7)
HGB BLD-MCNC: 12 G/DL (ref 13.3–17.7)
INTERPRETATION ECG - MUSE: NORMAL
MCH RBC QN AUTO: 30.1 PG (ref 26.5–33)
MCH RBC QN AUTO: 30.2 PG (ref 26.5–33)
MCHC RBC AUTO-ENTMCNC: 32.9 G/DL (ref 31.5–36.5)
MCHC RBC AUTO-ENTMCNC: 33.2 G/DL (ref 31.5–36.5)
MCV RBC AUTO: 91 FL (ref 78–100)
MCV RBC AUTO: 92 FL (ref 78–100)
P AXIS - MUSE: 39 DEGREES
PATH REPORT.COMMENTS IMP SPEC: ABNORMAL
PATH REPORT.COMMENTS IMP SPEC: NORMAL
PATH REPORT.COMMENTS IMP SPEC: YES
PATH REPORT.FINAL DX SPEC: ABNORMAL
PATH REPORT.FINAL DX SPEC: NORMAL
PATH REPORT.GROSS SPEC: ABNORMAL
PATH REPORT.GROSS SPEC: NORMAL
PATH REPORT.MICROSCOPIC SPEC OTHER STN: ABNORMAL
PATH REPORT.MICROSCOPIC SPEC OTHER STN: NORMAL
PATH REPORT.RELEVANT HX SPEC: ABNORMAL
PATH REPORT.RELEVANT HX SPEC: NORMAL
PHOTO IMAGE: NORMAL
PLATELET # BLD AUTO: 262 10E3/UL (ref 150–450)
PLATELET # BLD AUTO: 277 10E3/UL (ref 150–450)
POTASSIUM SERPL-SCNC: 3.2 MMOL/L (ref 3.4–5.3)
POTASSIUM SERPL-SCNC: 3.6 MMOL/L (ref 3.4–5.3)
PR INTERVAL - MUSE: 206 MS
QRS DURATION - MUSE: 76 MS
QT - MUSE: 346 MS
QTC - MUSE: 461 MS
R AXIS - MUSE: -13 DEGREES
RBC # BLD AUTO: 3.75 10E6/UL (ref 4.4–5.9)
RBC # BLD AUTO: 3.98 10E6/UL (ref 4.4–5.9)
SODIUM SERPL-SCNC: 137 MMOL/L (ref 135–145)
SYSTOLIC BLOOD PRESSURE - MUSE: NORMAL MMHG
T AXIS - MUSE: 37 DEGREES
UFH PPP CHRO-ACNC: 0.14 IU/ML (ref ?–1.1)
UFH PPP CHRO-ACNC: 0.35 IU/ML (ref ?–1.1)
UFH PPP CHRO-ACNC: 0.43 IU/ML (ref ?–1.1)
VENTRICULAR RATE- MUSE: 107 BPM
WBC # BLD AUTO: 10.7 10E3/UL (ref 4–11)
WBC # BLD AUTO: 9.9 10E3/UL (ref 4–11)

## 2025-06-06 PROCEDURE — 84132 ASSAY OF SERUM POTASSIUM: CPT | Performed by: INTERNAL MEDICINE

## 2025-06-06 PROCEDURE — 85520 HEPARIN ASSAY: CPT | Performed by: STUDENT IN AN ORGANIZED HEALTH CARE EDUCATION/TRAINING PROGRAM

## 2025-06-06 PROCEDURE — 99207 PR APP CREDIT; MD BILLING SHARED VISIT: CPT | Mod: FS | Performed by: INTERNAL MEDICINE

## 2025-06-06 PROCEDURE — 258N000003 HC RX IP 258 OP 636: Performed by: ANESTHESIOLOGY

## 2025-06-06 PROCEDURE — 36415 COLL VENOUS BLD VENIPUNCTURE: CPT | Performed by: PHYSICIAN ASSISTANT

## 2025-06-06 PROCEDURE — 99207 PR APP CREDIT; MD BILLING SHARED VISIT: CPT | Performed by: PHYSICIAN ASSISTANT

## 2025-06-06 PROCEDURE — 250N000013 HC RX MED GY IP 250 OP 250 PS 637: Performed by: INTERNAL MEDICINE

## 2025-06-06 PROCEDURE — 80048 BASIC METABOLIC PNL TOTAL CA: CPT | Performed by: PHYSICIAN ASSISTANT

## 2025-06-06 PROCEDURE — 120N000002 HC R&B MED SURG/OB UMMC

## 2025-06-06 PROCEDURE — 250N000011 HC RX IP 250 OP 636: Performed by: INTERNAL MEDICINE

## 2025-06-06 PROCEDURE — 85520 HEPARIN ASSAY: CPT | Performed by: INTERNAL MEDICINE

## 2025-06-06 PROCEDURE — 85027 COMPLETE CBC AUTOMATED: CPT | Performed by: PHYSICIAN ASSISTANT

## 2025-06-06 PROCEDURE — 36415 COLL VENOUS BLD VENIPUNCTURE: CPT | Performed by: STUDENT IN AN ORGANIZED HEALTH CARE EDUCATION/TRAINING PROGRAM

## 2025-06-06 PROCEDURE — 36415 COLL VENOUS BLD VENIPUNCTURE: CPT | Performed by: INTERNAL MEDICINE

## 2025-06-06 PROCEDURE — 250N000011 HC RX IP 250 OP 636: Performed by: PHYSICIAN ASSISTANT

## 2025-06-06 PROCEDURE — 99232 SBSQ HOSP IP/OBS MODERATE 35: CPT | Mod: GC | Performed by: INTERNAL MEDICINE

## 2025-06-06 PROCEDURE — 258N000003 HC RX IP 258 OP 636

## 2025-06-06 PROCEDURE — 250N000013 HC RX MED GY IP 250 OP 250 PS 637: Performed by: PHYSICIAN ASSISTANT

## 2025-06-06 PROCEDURE — 999N000147 HC STATISTIC PT IP EVAL DEFER

## 2025-06-06 PROCEDURE — 258N000003 HC RX IP 258 OP 636: Performed by: PHYSICIAN ASSISTANT

## 2025-06-06 RX ORDER — NALOXONE HYDROCHLORIDE 0.4 MG/ML
0.4 INJECTION, SOLUTION INTRAMUSCULAR; INTRAVENOUS; SUBCUTANEOUS
Status: DISCONTINUED | OUTPATIENT
Start: 2025-06-06 | End: 2025-06-11

## 2025-06-06 RX ORDER — NALOXONE HYDROCHLORIDE 0.4 MG/ML
0.2 INJECTION, SOLUTION INTRAMUSCULAR; INTRAVENOUS; SUBCUTANEOUS
Status: DISCONTINUED | OUTPATIENT
Start: 2025-06-06 | End: 2025-06-11

## 2025-06-06 RX ORDER — AMOXICILLIN 250 MG
1 CAPSULE ORAL DAILY
Status: DISCONTINUED | OUTPATIENT
Start: 2025-06-06 | End: 2025-06-11

## 2025-06-06 RX ORDER — SODIUM CHLORIDE, SODIUM LACTATE, POTASSIUM CHLORIDE, CALCIUM CHLORIDE 600; 310; 30; 20 MG/100ML; MG/100ML; MG/100ML; MG/100ML
INJECTION, SOLUTION INTRAVENOUS CONTINUOUS
Status: DISCONTINUED | OUTPATIENT
Start: 2025-06-06 | End: 2025-06-11

## 2025-06-06 RX ORDER — LISINOPRIL 40 MG/1
40 TABLET ORAL DAILY
Status: DISCONTINUED | OUTPATIENT
Start: 2025-06-06 | End: 2025-06-11

## 2025-06-06 RX ORDER — POTASSIUM CHLORIDE 1500 MG/1
40 TABLET, EXTENDED RELEASE ORAL ONCE
Status: COMPLETED | OUTPATIENT
Start: 2025-06-06 | End: 2025-06-06

## 2025-06-06 RX ORDER — HEPARIN SODIUM 10000 [USP'U]/100ML
0-5000 INJECTION, SOLUTION INTRAVENOUS CONTINUOUS
Status: DISCONTINUED | OUTPATIENT
Start: 2025-06-06 | End: 2025-06-07

## 2025-06-06 RX ADMIN — LISINOPRIL 40 MG: 20 TABLET ORAL at 11:30

## 2025-06-06 RX ADMIN — POTASSIUM CHLORIDE 40 MEQ: 750 TABLET, EXTENDED RELEASE ORAL at 11:07

## 2025-06-06 RX ADMIN — SODIUM CHLORIDE, SODIUM LACTATE, POTASSIUM CHLORIDE, AND CALCIUM CHLORIDE: .6; .31; .03; .02 INJECTION, SOLUTION INTRAVENOUS at 18:04

## 2025-06-06 RX ADMIN — DEXTROSE AND SODIUM CHLORIDE: 5; 900 INJECTION, SOLUTION INTRAVENOUS at 18:53

## 2025-06-06 RX ADMIN — SODIUM CHLORIDE, SODIUM LACTATE, POTASSIUM CHLORIDE, AND CALCIUM CHLORIDE: .6; .31; .03; .02 INJECTION, SOLUTION INTRAVENOUS at 06:21

## 2025-06-06 RX ADMIN — HEPARIN SODIUM 1650 UNITS/HR: 10000 INJECTION, SOLUTION INTRAVENOUS at 22:25

## 2025-06-06 RX ADMIN — HEPARIN SODIUM 1350 UNITS/HR: 10000 INJECTION, SOLUTION INTRAVENOUS at 06:40

## 2025-06-06 ASSESSMENT — ACTIVITIES OF DAILY LIVING (ADL)
ADLS_ACUITY_SCORE: 66
DRESSING/BATHING_DIFFICULTY: NO
ADLS_ACUITY_SCORE: 62
ADLS_ACUITY_SCORE: 38
CHANGE_IN_FUNCTIONAL_STATUS_SINCE_ONSET_OF_CURRENT_ILLNESS/INJURY: NO
ADLS_ACUITY_SCORE: 62
ADLS_ACUITY_SCORE: 62
ADLS_ACUITY_SCORE: 38
HEARING_DIFFICULTY_OR_DEAF: NO
DIFFICULTY_EATING/SWALLOWING: NO
ADLS_ACUITY_SCORE: 66
ADLS_ACUITY_SCORE: 59
ADLS_ACUITY_SCORE: 62
ADLS_ACUITY_SCORE: 33
WALKING_OR_CLIMBING_STAIRS_DIFFICULTY: NO
CONCENTRATING,_REMEMBERING_OR_MAKING_DECISIONS_DIFFICULTY: NO
ADLS_ACUITY_SCORE: 62
ADLS_ACUITY_SCORE: 62
ADLS_ACUITY_SCORE: 66
FALL_HISTORY_WITHIN_LAST_SIX_MONTHS: NO
ADLS_ACUITY_SCORE: 62
ADLS_ACUITY_SCORE: 33
ADLS_ACUITY_SCORE: 62
DIFFICULTY_COMMUNICATING: NO
ADLS_ACUITY_SCORE: 66
TOILETING_ISSUES: NO
DOING_ERRANDS_INDEPENDENTLY_DIFFICULTY: NO
ADLS_ACUITY_SCORE: 38
WEAR_GLASSES_OR_BLIND: NO
ADLS_ACUITY_SCORE: 66
ADLS_ACUITY_SCORE: 62
ADLS_ACUITY_SCORE: 62

## 2025-06-06 NOTE — PLAN OF CARE
1900-  Vitals:    Neuro: Ax4 Denies Headache, dizziness,  Lightheadedness, numbness and tingling. calls appropriately   Cardiac: SR  Afebrile, VSS.  Denies chest pain.   Respiratory: sating >95 ON RA. denies SOB. LS clear bilaterally.   Diet/appetite: regular Diet. Good appetite.   Endocrine: BS check Q4hr. Pt on sliding scale  insulin   GI/:  Small BM this shift. Denies abdominal pain. Good urine output.   Activity:  Moves independetly  Pain: Denies  Skin: WNL, Warm, dry, normal color    Problem: Adult Inpatient Plan of Care  Goal: Absence of Hospital-Acquired Illness or Injury  Outcome: Progressing  Intervention: Identify and Manage Fall Risk  Recent Flowsheet Documentation  Taken 6/5/2025 2126 by Windy Carrero RN  Safety Promotion/Fall Prevention: safety round/check completed  Intervention: Prevent Skin Injury  Recent Flowsheet Documentation  Taken 6/5/2025 2126 by Windy Carrero RN  Body Position: supine, head elevated  Skin Protection: adhesive use limited  Intervention: Prevent and Manage VTE (Venous Thromboembolism) Risk  Recent Flowsheet Documentation  Taken 6/5/2025 2126 by Windy Carrero RN  VTE Prevention/Management:   SCDs off (sequential compression devices)   patient refused intervention  Intervention: Prevent Infection  Recent Flowsheet Documentation  Taken 6/5/2025 2126 by Windy Carrero RN  Infection Prevention:   cohorting utilized   environmental surveillance performed   equipment surfaces disinfected   hand hygiene promoted   personal protective equipment utilized   rest/sleep promoted   single patient room provided     Problem: Adult Inpatient Plan of Care  Goal: Optimal Comfort and Wellbeing  Outcome: Progressing  Intervention: Monitor Pain and Promote Comfort  Recent Flowsheet Documentation  Taken 6/5/2025 2126 by Windy Carrero RN  Pain Management Interventions:   rest   repositioned   pillow support provided  Intervention: Provide Person-Centered Care  Recent Flowsheet Documentation  Taken  6/5/2025 2126 by Windy Carrero, RN  Trust Relationship/Rapport:   care explained   choices provided   thoughts/feelings acknowledged

## 2025-06-06 NOTE — PLAN OF CARE
Goal Outcome Evaluation:  Admitted this 69 yr old male from PACU for N/V, abdominal distension, and lack of BM or passing gas since 6/2/25. S/p hernia repair a week ago with biopsy and now resulted to adenocarcinoma.  Large bowel obstruction secondary to mass in the sigmoid colon, s/p colonoscopy with stent placement done today.Made comfortable in bed. Orientation to room, call light and staff done. No c/o pain or discomfort and breathing comfortably in room air. With ongoing IVF LR at 100 ml/hr , D5 NS at 100 ml/hr and on continuous heparin drip at 1650 unit(s)/hr.  Abdomen appeared distended, firm and tender to touch. Incision surrounding umbilical area well approximated and lap sites clean mitch and intact.Skin jaundiced and bruise noted on lower abdomen and suprapubic area. Was seen and examined by Gi team. Pt placed on NPO for CT of abdomen tomorrow and LR rate decreased to 50 ml/hr.  Able to ambulate to the bathroom with SBA, steady gait fair balance. Hep 10A  resulted to therapeutic level, heparin rate maintained. hep10A level recheck at 2300. Patient voiding freely and reported multiple BMs today.  P; Continue to monitor patient. Maintain NPO status for CT of abdomen tomorrow.

## 2025-06-06 NOTE — PLAN OF CARE
7C PT Deferral: Patient greeted supine in bed stating that he was just up ambulating IND around the unit. Patient declines PT needs at this time and declines concerns about eventual safe return home. Will complete PT orders at this time.

## 2025-06-06 NOTE — PLAN OF CARE
Goal Outcome Evaluation:      Plan of Care Reviewed With: patient    Overall Patient Progress: improvingOverall Patient Progress: improving     Pt transferring to , report given to Colby RN at approx 1350. Belongings brought by pt packed sent w pt upon transfer. Pt transferred over at approx 1420.     While pt w this RN from [id number]: A&Ox4, VSS on RA x HTN within parameters, pt still declining tele monitoring despite education. Pt denies pain throughout shift, and also denies N/v, chest pain, headache, and SOB. Full liquid diet tolerated fairly, AM BG check 119. SBA oob, 5/5 strengths w good coordination. No new skin deficits noted. X2 PIV, one infusing heparin infusion w D5wNS, other infusing LR 100mL/hr.     Plan to discharge home as soon as tomorrow after seeing how pt tolerates full liquid diet, the pain/discomfort from procedure, and later discontinuing the heparin infusion.
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
## 2025-06-06 NOTE — PROGRESS NOTES
Gastroenterology Consultation  GI Advanced Endoscopy/Pancreaticobiliary Service    Date of Admission:  6/4/2025  Date of Consult  6/4/2025   Reason for consult:     new adenocarcinoma, concern for panc primary, bowel obstruction may be due to carcinomatosis - consideration for stent      Requesting Physician:  Kenan Brooks DO  PATIENT:  Gamaliel Johnson  MRN:         [id number]          ASSESSMENT AND RECOMMENDATIONS:   Assessment:  Gamaliel Johnson is a 69 year old male who was admitted on 6/4/2025 for N/V, abdominal distension, and lack of BM or passing gas since 6/2/25.  Patient had undergone open umbilical hernia repair on 5/30/25.  There was nodularity, induration and omental caking; no immediate path was available the hernia sample and omental biopsy were sent and today they returned as adenocarcinoma of unknown origin.  CT c/a/p shows new PE and possible pancreatic mass, and bowel obstruction around the sigmoid colon.  Patient was transferred from [place], [place] for surg onc, GI, and oncology evaluation.   Patient is clinically stable and well appearing, although does get a little nauseated if he tries to eat or drink anything.  Overall good functional status.     Patient has PMH of HTN, treated HCV, AUD drinking about 6 pack a day for many years, and marijuana use.  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  Daughter is involved and lives in the [facility].  No FH of pancreatic cancer, uncle with colon cancer.    # Umbilical hernia with poorly differentiated adenocarcinoma of unknown origin  # Large bowel obstruction secondary to mass in the sigmoid colon, s/p colonoscopy with stent placement  - Bx without malignancy, although unclear if adequately sampled  # Peritoneal carcinomatosis  # Distal pancreatic mass, s/p EUS FNA on 6/6/25 with poorly differentiated carcinoma  # New PE, RLL, segmental, with possible infarct    Recommendations:  - Recommend staying on a liquid diet today.   If abdominal distention improves, can ADAT tomorrow to low residue diet.  If not improving, tomorrow may need repeat CT scan to make sure no further obstruction proximal to the colonic stent.  But patient is having BMs so it does appear that things are unblocked.   - Anticoagulation per primary  - Appreciate medical and surgical oncology recs.    Thank you for involving us in this patient's care. Please do not hesitate to contact the GI service with any questions or concerns.  The patient was discussed and plan agreed upon with Dr. Barber.    Abel Sherman (Obey Beverly DO, MS  Gastroenterology Fellow  [facility]  Text Page          History of Present Illness:   Patient is having BMs  Still distended  Tolerating liquids  No further vomiting.             Review of Systems:     A review of systems was performed and is negative except as noted in the HPI           Physical Exam:   Temp: 98.2  F (36.8  C) Temp src: Oral BP: (!) 156/65 Pulse: 110   Resp: 21 SpO2: 95 % O2 Device: None (Room air)          General Appearance: NAD, pleasant  HEENT: EOMI  Respiratory: Breathing comfortably on RA  Cardiovascular: Mildly tachycardidc  GI: soft, distended non-tender, surgical site c/d/i, no peritoneal sign  Extremities: No LE edema noted   Neuro: Moving all extremities   Skin: No jaundice rash on exposed areas   Psych: Alert and oriented, appropriate mood and affect, speech coherent / logical    _______________________________________________________________  Data:  Labs and imaging for last 24 hours were independently reviewed and interpreted

## 2025-06-07 LAB
ALBUMIN SERPL BCG-MCNC: 2.6 G/DL (ref 3.5–5.2)
ALP SERPL-CCNC: 66 U/L (ref 40–150)
ALT SERPL W P-5'-P-CCNC: 10 U/L (ref 0–70)
ANION GAP SERPL CALCULATED.3IONS-SCNC: 10 MMOL/L (ref 7–15)
AST SERPL W P-5'-P-CCNC: 21 U/L (ref 0–45)
BILIRUB SERPL-MCNC: 0.4 MG/DL
BUN SERPL-MCNC: 10.5 MG/DL (ref 8–23)
CALCIUM SERPL-MCNC: 8 MG/DL (ref 8.8–10.4)
CHLORIDE SERPL-SCNC: 107 MMOL/L (ref 98–107)
CREAT SERPL-MCNC: 0.62 MG/DL (ref 0.67–1.17)
EGFRCR SERPLBLD CKD-EPI 2021: >90 ML/MIN/1.73M2
ERYTHROCYTE [DISTWIDTH] IN BLOOD BY AUTOMATED COUNT: 13.6 % (ref 10–15)
GLUCOSE BLDC GLUCOMTR-MCNC: 106 MG/DL (ref 70–99)
GLUCOSE SERPL-MCNC: 134 MG/DL (ref 70–99)
HCO3 SERPL-SCNC: 19 MMOL/L (ref 22–29)
HCT VFR BLD AUTO: 34.4 % (ref 40–53)
HGB BLD-MCNC: 11.3 G/DL (ref 13.3–17.7)
MCH RBC QN AUTO: 29.7 PG (ref 26.5–33)
MCHC RBC AUTO-ENTMCNC: 32.8 G/DL (ref 31.5–36.5)
MCV RBC AUTO: 91 FL (ref 78–100)
PLATELET # BLD AUTO: 300 10E3/UL (ref 150–450)
POTASSIUM SERPL-SCNC: 3.4 MMOL/L (ref 3.4–5.3)
PROT SERPL-MCNC: 5.8 G/DL (ref 6.4–8.3)
RBC # BLD AUTO: 3.8 10E6/UL (ref 4.4–5.9)
SODIUM SERPL-SCNC: 136 MMOL/L (ref 135–145)
UFH PPP CHRO-ACNC: 0.3 IU/ML (ref ?–1.1)
WBC # BLD AUTO: 9.8 10E3/UL (ref 4–11)

## 2025-06-07 PROCEDURE — 258N000003 HC RX IP 258 OP 636

## 2025-06-07 PROCEDURE — 99223 1ST HOSP IP/OBS HIGH 75: CPT | Mod: GC | Performed by: HOSPITALIST

## 2025-06-07 PROCEDURE — 36415 COLL VENOUS BLD VENIPUNCTURE: CPT | Performed by: PHYSICIAN ASSISTANT

## 2025-06-07 PROCEDURE — 82374 ASSAY BLOOD CARBON DIOXIDE: CPT | Performed by: PHYSICIAN ASSISTANT

## 2025-06-07 PROCEDURE — 250N000011 HC RX IP 250 OP 636: Performed by: PHYSICIAN ASSISTANT

## 2025-06-07 PROCEDURE — 250N000013 HC RX MED GY IP 250 OP 250 PS 637: Performed by: PHYSICIAN ASSISTANT

## 2025-06-07 PROCEDURE — 85048 AUTOMATED LEUKOCYTE COUNT: CPT | Performed by: EMERGENCY MEDICINE

## 2025-06-07 PROCEDURE — 85018 HEMOGLOBIN: CPT | Performed by: EMERGENCY MEDICINE

## 2025-06-07 PROCEDURE — 99232 SBSQ HOSP IP/OBS MODERATE 35: CPT | Mod: GC | Performed by: INTERNAL MEDICINE

## 2025-06-07 PROCEDURE — 99207 PR APP CREDIT; MD BILLING SHARED VISIT: CPT | Performed by: PHYSICIAN ASSISTANT

## 2025-06-07 PROCEDURE — 120N000002 HC R&B MED SURG/OB UMMC

## 2025-06-07 PROCEDURE — 84155 ASSAY OF PROTEIN SERUM: CPT | Performed by: PHYSICIAN ASSISTANT

## 2025-06-07 PROCEDURE — 99233 SBSQ HOSP IP/OBS HIGH 50: CPT | Mod: FS

## 2025-06-07 PROCEDURE — 250N000009 HC RX 250: Performed by: PHYSICIAN ASSISTANT

## 2025-06-07 RX ORDER — HYDRALAZINE HYDROCHLORIDE 10 MG/1
10 TABLET, FILM COATED ORAL 3 TIMES DAILY PRN
Status: DISCONTINUED | OUTPATIENT
Start: 2025-06-07 | End: 2025-06-07

## 2025-06-07 RX ORDER — IOPAMIDOL 755 MG/ML
107 INJECTION, SOLUTION INTRAVASCULAR ONCE
Status: COMPLETED | OUTPATIENT
Start: 2025-06-07 | End: 2025-06-07

## 2025-06-07 RX ADMIN — APIXABAN 10 MG: 5 TABLET, FILM COATED ORAL at 11:31

## 2025-06-07 RX ADMIN — IOPAMIDOL 107 ML: 755 INJECTION, SOLUTION INTRAVENOUS at 15:27

## 2025-06-07 RX ADMIN — DEXTROSE AND SODIUM CHLORIDE: 5; 900 INJECTION, SOLUTION INTRAVENOUS at 05:02

## 2025-06-07 RX ADMIN — LISINOPRIL 40 MG: 20 TABLET ORAL at 07:48

## 2025-06-07 RX ADMIN — SODIUM CHLORIDE 77 ML: 9 INJECTION, SOLUTION INTRAVENOUS at 15:27

## 2025-06-07 RX ADMIN — APIXABAN 10 MG: 5 TABLET, FILM COATED ORAL at 21:04

## 2025-06-07 ASSESSMENT — ACTIVITIES OF DAILY LIVING (ADL)
ADLS_ACUITY_SCORE: 38
ADLS_ACUITY_SCORE: 35
ADLS_ACUITY_SCORE: 35
ADLS_ACUITY_SCORE: 38
ADLS_ACUITY_SCORE: 38
ADLS_ACUITY_SCORE: 35
ADLS_ACUITY_SCORE: 38
DEPENDENT_IADLS:: INDEPENDENT
ADLS_ACUITY_SCORE: 37
ADLS_ACUITY_SCORE: 38
ADLS_ACUITY_SCORE: 35
ADLS_ACUITY_SCORE: 37
ADLS_ACUITY_SCORE: 38
ADLS_ACUITY_SCORE: 35
ADLS_ACUITY_SCORE: 35
ADLS_ACUITY_SCORE: 38
ADLS_ACUITY_SCORE: 35
ADLS_ACUITY_SCORE: 38
ADLS_ACUITY_SCORE: 35
ADLS_ACUITY_SCORE: 35

## 2025-06-07 NOTE — PLAN OF CARE
Shift Hours: 1900 - 0700    Assessment:  Body systems assessments were at patient's baseline.        Activity     Fall Risk Score: 45   Bed alarm on? No     Activity Assistance Provided: assistance, 1 person      Assistive Device Utilized: other (see comments)    Pain: denies    Labs/RN Managed Protocols: xxx    Lines/Drains:L and R PIV    Nutrition:NPO excepts meds and Ice chips     Goal Outcome Evaluation  Pt alert and oriented x4 , denies pain , Pt on heparin drip at 1650 . On therapeutic level recheck tomorrow at 7am . Pt BM x 5 loose stool . SBA assist to bathroom . Continue plan of care.     Barriers to Discharge:   NPO for CT abdoment today.

## 2025-06-07 NOTE — PROGRESS NOTES
Gastroenterology Consultation  GI Advanced Endoscopy/Pancreaticobiliary Service    Date of Admission:  6/4/2025  Date of Consult  6/4/2025   Reason for consult:     new adenocarcinoma, concern for panc primary, bowel obstruction may be due to carcinomatosis - consideration for stent      Requesting Physician:  Kenan Brooks DO  PATIENT:  Gamaliel Johnson  MRN:         [id number]          ASSESSMENT AND RECOMMENDATIONS:   Assessment:  Gamaliel Johnson is a 69 year old male who was admitted on 6/4/2025 for N/V, abdominal distension, and lack of BM or passing gas since 6/2/25.  Patient had undergone open umbilical hernia repair on 5/30/25.  There was nodularity, induration and omental caking; no immediate path was available the hernia sample and omental biopsy were sent and today they returned as adenocarcinoma of unknown origin.  CT c/a/p shows new PE and possible pancreatic mass, and bowel obstruction around the sigmoid colon.  Patient was transferred from [place], [place] for surg onc, GI, and oncology evaluation.   Patient is clinically stable and well appearing, although does get a little nauseated if he tries to eat or drink anything.  Overall good functional status.     Patient has PMH of HTN, treated HCV, AUD drinking about 6 pack a day for many years, and marijuana use.  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  Daughter is involved and lives in the [facility].  No FH of pancreatic cancer, uncle with colon cancer.    # Umbilical hernia with poorly differentiated adenocarcinoma of unknown origin  # Large bowel obstruction secondary to mass in the sigmoid colon, s/p colonoscopy with stent placement  - Bx without malignancy, although unclear if adequately sampled  # Peritoneal carcinomatosis  # Distal pancreatic mass, s/p EUS FNA on 6/6/25 with poorly differentiated carcinoma  # New PE, RLL, segmental, with possible infarct    Recommendations:  - ADAT as tolerated, would start with  liquids.   - Passing gas/having BM but distension/discomfort still present agree with obtaining CT A/P  - Anticoagulation per primary  - Appreciate medical and surgical oncology recs.    Thank you for involving us in this patient's care. Please do not hesitate to contact the GI service with any questions or concerns.  The patient was discussed and plan agreed upon with Dr. Barber.    David David MD  Gastroenterology Fellow          History of Present Illness:   Patient is having BMs  Still distended and having abdominal pain   Tolerating liquids  No further vomiting.             Review of Systems:     A review of systems was performed and is negative except as noted in the HPI           Physical Exam:   Temp: 98.5  F (36.9  C) Temp src: Oral BP: (!) 158/94 Pulse: 95   Resp: 18 SpO2: 93 % O2 Device: None (Room air)          General Appearance: NAD, pleasant. Family at bedside.   HEENT: EOMI  Respiratory: Breathing comfortably on RA  Cardiovascular: Mildly tachycardidc  GI: soft, distended mildly tender surgical site c/d/i, no peritoneal sign  Extremities: No LE edema noted   Neuro: Moving all extremities   Skin: No jaundice rash on exposed areas   Psych: Alert and oriented, appropriate mood and affect, speech coherent / logical    _______________________________________________________________  Data:  Labs and imaging for last 24 hours were independently reviewed and interpreted

## 2025-06-07 NOTE — CONSULTS
"  Oncology  Consult Note   Date of Service: 06/07/2025    Patient: Gamaliel Johnson  MRN: [id number]  Admission Date: 6/4/2025  Hospital Day # 3  Cancer Diagnosis: Metastatic pancreatic adenocarcinoma  Primary Outpatient Oncologist: N/A  Current Treatment Plan: N/A    Reason for Consult: \"patient with multiple concerning abdominal masses. biopsy from abdominal nodules 5/30 adenocarcinoma. FNA from pancreas and biopsy from colonic mass 6/6 now resulted as well.\"     Assessment:   Gamaliel Johnson is a 69 year old male with medical history of hypertension, alcohol use disorder, HCV s/p treatment, and new diagnosis of metastatic pancreatic adenocarcinoma.    # Stage IV pancreatic adenocarcinoma  # Secondary peritoneal carcinomatosis  New diagnosis.  CT CAP 6/4 successful noted 3.9 distal pancreatic mass, as well as omental/mesenteric nodularity and mild perihepatic ascites.  During prior umbilical hernia repair surgery on 5/30, he was incidentally found to have peritoneal nodularity which was biopsied and revealed adenocarcinoma.  EUS with FNA biopsy of pancreatic mass resulted as adenocarcinoma in 6/5.  CT chest abdomen pelvis did not reveal any other metastatic sites.    Based on this information, patient has metastatic, stage IV pancreatic adenocarcinoma.  He is previous ECOG performance status prior to these acute events was 0; currently ECOG PS is around 2.  We discussed this diagnosis with patient and family on 6/7, and broadly went over next steps of management including potential treatment options with systemic chemotherapy.  We also discussed that prognosis for metastatic pancreatic cancer is poor with average overall survival of less than year even with first-line treatment.  He will need further molecular testing for this cancer, and follow-up outpatient with GI specialist here for further cares.  He is in agreement with these plans.    # Large bowel obstruction  Presented with symptoms of bowel obstruction, " and on colonoscopy 6/5 was found to have colonic mass at the level of sigmoid colon/descending colon, which was biopsied but did not reveal any malignancy.  He had stent placed at proximal end for this.  His metastatic cancer with peritoneal carcinomatosis puts him at risk of abdominal adhesions that could cause bowel obstruction, although no current evidence of malignant obstruction is seen.  Management per GI and primary team.    # Right lower lobe pulmonary emboli  Noted on CT chest with contrast on 6/4 as having multiple right lower lobe pulmonary emboli with probable evolving right lower lobe pulmonary infarct.  Started on heparin by primary team, and planned for switch to DOAC, which is reasonable from our standpoint.    Recommendations/plan:   - We have discussed the diagnosis, prognosis and next step of management from metastatic cancer standpoint with patient and family on 6/7.  He has outpatient referral in place to follow-up with GI specialist here at [facility].  - Management of acute medical issues including bowel obstruction, and pulmonary emboli per primary team and GI.  Reasonable to switch to DOAC from our standpoint if not plan for any surgeries.  - He will need genetic/multiple testing on his biopsy tissue samples, which can be pursued at outpatient oncology follow-up.  - Discharge per primary team when medically stable.  No other immediate oncology evaluation or treatment plans for inpatient at this time    Thank you for this consult. We will continue to follow this patient. Please do not hesitate to page with any questions or concerns.    Patient was seen and plan of care was discussed with attending physician Dr. Ron. Attestation to follow.     Fahad Lynn MD PGY4  Hematology/Oncology     History of Present Illness:    Gamaliel Johnson is a 69 year old male with medical history of hypertension, alcohol use disorder, HCV s/p treatment, and new diagnosis of metastatic pancreatic  adenocarcinoma.    Briefly, see oncology history as below.  Patient presented to the hospital on 6/4 with nausea, worsening abdominal pain, and on admission imaging was found to have PE, as well as possible large bowel obstruction, distal pancreatic mass.  Prior to this, he had umbilical hernia surgery on 5/30 and was instantly found to have omental nodularity with biopsy showing adenocarcinoma.  He underwent colonoscopy with stent placement on 6/5 for his bowel obstruction, as well as underwent EUS with biopsies taken from distal pancreatic mass.  Pathology resulted in adenocarcinoma from the pancreatic biopsy sample.    Today, he was seen with multiple family members present at bedside.  His diagnosis was discussed with all relevant imaging and biopsy findings today.  He reports having loose stools, multiple bathroom visits, and abnormal distention, particularly more since last night.  He reports that he has not had good p.o. intake in the last few days.  Otherwise, he reports that prior to his umbilical hernia repair he was fully functional, with no limitations in his ADLs/IADLs.  He reports he lives in [facility], but has family here in the [facility].  He denies any current fevers, chills, new rash, focal weakness, diplopia.     Oncologic History:  Metastatic pancreatic adenocarcinoma -diagnosed 6/5/2025  - 5/30/2025 umbilical hernia surgery, and instantly found to have nodularity in the peritoneum, which was biopsied and resulted as adenocarcinoma.  - 6/4/2025 presented to the hospital with nausea, vomiting, worsening abdominal pain - CT chest abdomen pelvis from 6/4 noted right lower lobe pulmonary emboli with evolving right lower lobe pulmonary infarct, mesenteric nodularity compatible with carcinomatosis, 3.9 cm distal pancreatic mass.  No other pulmonary or other metastatic sites reported.  - 6/5/25 Underwent colonoscopy with biopsy of sigmoid mass, and placement of 25 mm by 12 cm Hanero stent.   -  6/5/2025 underwent EUS with FNA biopsy of pancreatic tail mass.  - 6/5/2025 pathology from pancreatic tail mass resulted as adenocarcinoma.  Pathology from sigmoid mass negative for malignancy.    Review of Systems:  A comprehensive ROS was performed and found to be negative or non-contributory with the exception of that noted in the HPI above.    Past Medical History:  Past Medical History:   Diagnosis Date    Compensated HCV cirrhosis (H)     Hepatitis C        Past Surgical History:  Past Surgical History:   Procedure Laterality Date    COLONOSCOPY  1986    COLONOSCOPY N/A 2/19/2015    Procedure: COLONOSCOPY;  Surgeon: Efren Hernandez MD;  Location: [facility] OR    HERNIORRHAPHY UMBILICAL N/A 5/30/2025    Procedure: Open umbilical hernia repair, umbilical mass biopsy and omental biopsy;  Surgeon: Rainer Brenner MD;  Location: [facility] OR    VASECTOMY Bilateral        Social History:  Social History     Socioeconomic History    Marital status: Single    Number of children: 2   Tobacco Use    Smoking status: Never    Smokeless tobacco: Former     Types: Chew    Tobacco comments:     declined quit plan   Vaping Use    Vaping status: Never Used   Substance and Sexual Activity    Alcohol use: Yes     Comment: cut down to once a week    Drug use: Yes     Types: Marijuana     Comment: occasional    Sexual activity: Not Currently     Partners: Female   Other Topics Concern    Blood Transfusions Yes     Comment: PERMITS    Caffeine Concern Yes     Comment: about 4 cups    Sleep Concern No    Exercise Yes     Comment: lifts weights /Has gym in basement    Bike Helmet No     Comment: wears on his snowmobile/ not on motorcycle    Seat Belt Yes   Social History Narrative    11/20/2014: lives in [place], , Has 2 children 28 and 30.  Has a grandson     Social Drivers of Health     Financial Resource Strain: Low Risk  (6/6/2025)    Financial Resource Strain     Within the past 12 months, have you or your family members you  live with been unable to get utilities (heat, electricity) when it was really needed?: No   Food Insecurity: Low Risk  (6/6/2025)    Food Insecurity     Within the past 12 months, did you worry that your food would run out before you got money to buy more?: No     Within the past 12 months, did the food you bought just not last and you didn t have money to get more?: No   Transportation Needs: Low Risk  (6/6/2025)    Transportation Needs     Within the past 12 months, has lack of transportation kept you from medical appointments, getting your medicines, non-medical meetings or appointments, work, or from getting things that you need?: No   Interpersonal Safety: Low Risk  (6/6/2025)    Interpersonal Safety     Do you feel physically and emotionally safe where you currently live?: Yes     Within the past 12 months, have you been hit, slapped, kicked or otherwise physically hurt by someone?: No     Within the past 12 months, have you been humiliated or emotionally abused in other ways by your partner or ex-partner?: No   Housing Stability: High Risk (6/6/2025)    Housing Stability     Do you have housing? : Yes     Are you worried about losing your housing?: Yes        Family History  Family History   Problem Relation Age of Onset    Hypertension Father     Hypertension Mother        Outpatient Medications:  Current Facility-Administered Medications   Medication Dose Route Frequency Provider Last Rate Last Admin    acetaminophen (TYLENOL) tablet 650 mg  650 mg Oral Q4H PRN Sanchez Clifton PA-C        Or    acetaminophen (TYLENOL) Suppository 650 mg  650 mg Rectal Q4H PRN Sanchze Clifton PA-C        calcium carbonate (TUMS) chewable tablet 1,000 mg  1,000 mg Oral 4x Daily PRN Sanchez Clifton PA-C        dextrose 5% and 0.9% NaCl infusion   Intravenous Continuous Sanchez Clifton PA-C 100 mL/hr at 06/07/25 0502 New Bag at 06/07/25 0502    heparin 25,000 units in 0.45% NaCl 250 mL ANTICOAGULANT infusion  0-5,000 Units/hr Intravenous  Continuous Gregoria Mccloud PA-C 16.5 mL/hr at 06/06/25 2225 1,650 Units/hr at 06/06/25 2225    hydrALAZINE (APRESOLINE) tablet 10 mg  10 mg Oral Q4H PRN Kerri Moreno PA-C   10 mg at 06/04/25 1856    Or    hydrALAZINE (APRESOLINE) injection 10 mg  10 mg Intravenous Q4H PRN Kerri Moreno PA-C        HYDROmorphone (PF) (DILAUDID) injection 0.3 mg  0.3 mg Intravenous Q6H PRN Sanchez Clifton PA-C   0.3 mg at 06/04/25 2002    lactated ringers infusion   Intravenous Continuous Gregoria Mccloud PA-C 50 mL/hr at 06/06/25 1804 New Bag at 06/06/25 1804    lidocaine (LMX4) cream   Topical Q1H PRN Sanchez Clifton PA-C        lidocaine 1 % 0.1-1 mL  0.1-1 mL Other Q1H PRN Sanchez Clifton PA-C        lisinopril (ZESTRIL) tablet 40 mg  40 mg Oral Daily Gregoria Mccloud PA-C   40 mg at 06/07/25 0748    methocarbamol (ROBAXIN) injection 1,000 mg  1,000 mg Intravenous Q8H PRN Kerri Moreno PA-C   1,000 mg at 06/04/25 2353    naloxone (NARCAN) injection 0.2 mg  0.2 mg Intravenous Q2 Min PRN Swapna Leroy MD        Or    naloxone (NARCAN) injection 0.4 mg  0.4 mg Intravenous Q2 Min PRN Swapna Leroy MD        Or    naloxone (NARCAN) injection 0.2 mg  0.2 mg Intramuscular Q2 Min PRN Swapna Leroy MD        Or    naloxone (NARCAN) injection 0.4 mg  0.4 mg Intramuscular Q2 Min PRN Swapna Leroy MD        ondansetron (ZOFRAN ODT) ODT tab 4 mg  4 mg Oral Q6H PRN Sanchez Clifton PA-C        Or    ondansetron (ZOFRAN) injection 4 mg  4 mg Intravenous Q6H PRN Muras, Sanchez, PA-C        oxyCODONE (ROXICODONE) tablet 5 mg  5 mg Oral Q4H PRN Sanchez Clifton PA-C        oxyCODONE IR (ROXICODONE) half-tab 2.5 mg  2.5 mg Oral Q4H PRN Sanchez Clifton PA-C        Patient is already receiving anticoagulation with heparin, enoxaparin (LOVENOX), warfarin (COUMADIN)  or other anticoagulant medication   Does not apply Continuous PRN Sanchez Clifton PA-C        polyethylene glycol (MIRALAX) Packet 17 g  17 g Oral Daily PRN Gregoria Mccloud PA-C        [Held by  provider] polyethylene glycol (MIRALAX) Packet 17 g  17 g Oral Daily Sanchez Clifton PA-C   17 g at 06/04/25 1551    prochlorperazine (COMPAZINE) injection 5 mg  5 mg Intravenous Q6H PRN Sanchez Clifton PA-C        Or    prochlorperazine (COMPAZINE) tablet 5 mg  5 mg Oral Q6H PRN Sanchez Clifton PA-C        [Held by provider] senna-docusate (SENOKOT-S/PERICOLACE) 8.6-50 MG per tablet 1 tablet  1 tablet Oral Daily Gregoria Mccloud PA-C        sennosides (SENOKOT) tablet 1-2 tablet  1-2 tablet Oral BID PRN Gregoria Mccloud PA-C        sodium chloride (PF) 0.9% PF flush 3 mL  3 mL Intracatheter Q8H GEETHA Sanchez Clifton PA-C   3 mL at 06/07/25 0506    sodium chloride (PF) 0.9% PF flush 3 mL  3 mL Intracatheter q1 min prn Sanchez Clifton PA-C            Physical Exam:    Blood pressure (!) 164/95, pulse 95, temperature 99.2  F (37.3  C), temperature source Oral, resp. rate 20, SpO2 92%.  General: alert and cooperative, lying in bed, no acute distress  HEENT: sclera anicteric, moist mucous membranes  CV: RRR, no murmurs  Resp: CTAB, normal respiratory effort on ambient air on anterior auscultation  GI: Distended, nontender to palpation, no rebound or guarding  MSK: warm and well-perfused, normal tone, no edema  Skin: no jaundice, umbilical hernia surgical scar c/d/I, scattered ecchymosis in the abdomen  Neuro: Alert and interactive, moves all extremities equally, no focal deficits    Labs & Studies: I personally reviewed the following studies:  ROUTINE LABS (Last four results):  CMP  Recent Labs   Lab 06/07/25  0539 06/06/25 2001 06/06/25  1608 06/06/25  0916 06/06/25  0808 06/05/25  2157 06/05/25  0624 06/04/25  0643     --   --  137  --   --  136 130*   POTASSIUM 3.4  --  3.6 3.2*  --   --  3.8 4.1   CHLORIDE 107  --   --  106  --   --  102 93*   CO2 19*  --   --  21*  --   --  22 21*   ANIONGAP 10  --   --  10  --   --  12 16*   * 130*  --  132* 119*   < > 199* 161*   BUN 10.5  --   --  13.1  --   --  12.5 8.8   CR 0.62*  --    --  0.69  --   --  0.71 0.84   GFRESTIMATED >90  --   --  >90  --   --  >90 >90   ELÍAS 8.0*  --   --  8.5*  --   --  9.2 10.2   MAG  --   --   --   --   --   --  2.0  --    PROTTOTAL 5.8*  --   --   --   --   --  6.9 7.8   ALBUMIN 2.6*  --   --   --   --   --  3.6 4.0   BILITOTAL 0.4  --   --   --   --   --  0.5 0.5   ALKPHOS 66  --   --   --   --   --  69 79   AST 21  --   --   --   --   --  20 21   ALT 10  --   --   --   --   --  11 18    < > = values in this interval not displayed.     CBC  Recent Labs   Lab 06/07/25  0539 06/06/25  1821 06/06/25  0916 06/05/25  0624   WBC 9.8 9.9 10.7 15.4*   RBC 3.80* 3.75* 3.98* 4.67   HGB 11.3* 11.3* 12.0* 14.2   HCT 34.4* 34.0* 36.5* 41.7   MCV 91 91 92 89   MCH 29.7 30.1 30.2 30.4   MCHC 32.8 33.2 32.9 34.1   RDW 13.6 13.5 13.5 13.2    262 277 299     INR  Recent Labs   Lab 06/04/25  0643   INR 1.36*     IMAGING:  CT CHEST/ABDOMEN/PELVIS W CONTRAST  6/4/25  IMPRESSION:       Multiple right lower lobe pulmonary emboli. Probable evolving right  lower lobe pulmonary infarct.     Ascites. Mesenteric nodularity most compatible with carcinomatosis.     Probable 3.9 cm distal pancreatic mass versus peripancreatic  metastatic disease. Consider MR abdomen WWO plus MRCP for further  assessment.    Colonoscopy 6/4/2025  Impression:    Colonic mass at the level of the sigmoid                          colon/descending colon, biopsies were performed to                          confirm primary malignancy. A 25 mm by 12 cm Hanero                          stent was placed with proximal end at 25 cm with great                          difficulty under fluroscopy     PATHOLOGY  Case: [id number]   5/30/25  A.  Umbilicus, mass, excisional biopsy:  - Fibroadipose tissue with metastatic adenocarcinoma (see comment).     B.  Omentum, excisional biopsy:  - Omental adipose tissue with metastatic adenocarcinoma (see comment).    Case: [id number]  6/5/25  A. SIGMOID COLON MASS, BIOPSY:  -  Colonic mucosa with no significant histologic abnormality, see comment    [id number]  6/5/25  Specimen A  Pancreas, PANCREAS TAIL LESION, Fine Needle Aspirate:                 Interpretation:                  Positive for malignancy  Adenocarcinoma                 Adequacy:                 Satisfactory for evaluation        Specimen B  Other, PERITONEAL IMPLANTS, Fine Needle Aspirate:                 Interpretation:                  Positive for malignancy  Metastatic adenocarcinoma                 Adequacy:                 Satisfactory for evaluation

## 2025-06-07 NOTE — SUMMARY OF CARE
2 RN skin assessment completed by:chichi PEREZ and tim ACEVEDO      - Findings (add LDA if needed): bruises in the abdomen and midline incision.

## 2025-06-07 NOTE — PLAN OF CARE
Goal Outcome Evaluation:      Plan of Care Reviewed With: patient, family    Overall Patient Progress: no changeOverall Patient Progress: no change    Outcome Evaluation: Plan to discharge home when medically ready.    Candelaria Gallegos, RN    7C RN Coordinator   Weekend Covering RN CC  [facility] Acute Care Management  Phone: [phone]  Searchable on Vocera : 7C Med Surg 1001 thru 0851 RNCC & 7C Med Surg 0930 thru 9932 RNCC

## 2025-06-07 NOTE — PLAN OF CARE
Goal Outcome Evaluation:  Patient condition slightly improving. Alert and oriented x4, calm pleasant and cooperative with cares. Still on heparin drip at 1650 unit(s)/hr at the start of the shift but was stopped and discontinued after therapeutic HxA level. Abdomen still appeared distended, tender to touch and bruises noted. Was seen and rounded by primary team and D5Ns was discontinued. Ambulating to the bathroom with SBA, having multiple BM and voiding freely. NPO observed for CT of abdomen this afternoon. Contrast dye administered and was transported to radiology at 1515 via  for CT of abdomen and came back to unit in stable condition. Placed on clear liquid diet. Resting in bed as of this time.
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
## 2025-06-07 NOTE — PROGRESS NOTES
GI On Call Note    Paged regarding the patient's CT findings of a partial SBO.     This is likely in the setting of some extrinsic compression from peritoneal mets/carcinomatosis. There is no indication for GI endoscopic intervention. Patient previously had a LBO which was amenable to colonic stent, which appears patent.     Plan  - NPO  - NGT placement for decompression, LIS  - Recommend surgery consultation for recommendations regarding SBO  - Pain/nausea management per primary     Plan discussed with cross cover as well as GI attending, Dr Elisabeth David MD  Gastroenterology Fellow

## 2025-06-07 NOTE — PROGRESS NOTES
[facility]    Progress Note - Surgical Oncology Service       Date of Admission:  6/4/2025  Procedure(s):  COLONOSCOPY, WITH COLONIC STENT INSERTION  ESOPHAGOGASTRODUODENOSCOPY, WITH FINE NEEDLE ASPIRATION BIOPSY, WITH ENDOSCOPIC ULTRASOUND GUIDANCE on 6/4/2025 - 6/6/2025  Assessment & Plan: Surgery   Gamaliel is a 70 yo M who presents with colonic obstruction in the setting of recently diagnosed metastatic upper GI cancer. On imaging this appears to be most likely a pancreas primary with ascites and carcinomatosis. This was confirmed on EUS with FNA biopsy on 6/5. Unfortunately there is no surgical solution for his pancreas cancer. Appreciate GI involvement for management of colonic obstruction with stent (per notes, difficult to place) and stool softeners. For symptom management, strongly recommend NG tube placement.        - No role for surgery at this time (can re-discuss with family in AM)  - NPO, NG tube  - Encourage stool softeners, ambulation as tolerated  - Appreciate GI and Oncology management  - Rest of cares per primary    The patient's care was discussed with the Attending Physician, Dr. Leiva and Chief Resident/Fellow.    Mariana Griffiths MD  [facility]  All communications related to this note should be expressed to resident/ZAKIA on call for this team at the time of your communication.  ______________________________________________________________________    Interval History   Alerted by primary team regarding new findings of small bowel obstruction on CT A/P obtained today. CT obtained after patient with ongoing reports of abdominal pain and distention, that has been slowly worsening over the past couple of days, despite colonic stent placement. Patient has been having liquid bowel movements with associated passage of gas. He has not had a solid bowel movement since arrival. Currently he is on miralax and  senokot PRN as a bowel regimen. Family and patient are concerned about his persistent distention and ongoing abdominal pain. They are concerned that his symptoms have been ongoing for over a week now and want to know what can be done for improvement. Patient currently denies any nausea or vomiting. His bloating feels stable to slightly worse. He is concerned about having an NG tube placed due to the likely discomfort. He has been voiding normally. He is asking what could have caused his cancer diagnosis. Denies family history of GI malignancy.     Physical Exam   Vital Signs: Temp: 98.5  F (36.9  C) Temp src: Oral BP: (!) 158/94 Pulse: 95   Resp: 18 SpO2: 93 % O2 Device: None (Room air)    Weight: 0 lbs 0 oz      Gen: awake, alert, conversational, NAD  Eyes: no icterus  Pulm: decreased excursion, increased rate, no wheezing  CV: regular rate/rhythm  Abd: distended, taut, +tympanic, +tender to palpation RUQ and epigastrium, compressible  Skin: diffuse abdominal bruising, incision intact and healing  Extremities: thin  Neuro: no focal deficits  Psych: calm, insight into illness unclear       Intake/Output Summary (Last 24 hours) at 6/7/2025 1933  Last data filed at 6/7/2025 0800  Gross per 24 hour   Intake 60 ml   Output --   Net 60 ml   Drains: None         Data     I have personally reviewed the following data over the past 24 hrs:    9.8  \   11.3 (L)   / 300     136 107 10.5 /  134 (H)   3.4 19 (L) 0.62 (L) \     ALT: 10 AST: 21 AP: 66 TBILI: 0.4   ALB: 2.6 (L) TOT PROTEIN: 5.8 (L) LIPASE: N/A       Imaging results reviewed over the past 24 hrs:   Recent Results (from the past 24 hours)   CT Abdomen Pelvis w Contrast    Impression    RESIDENT PRELIMINARY INTERPRETATION  Impression:   1. Low grade/partial small bowel obstruction without discrete  transition point or enteric contrast in the cecum. No imaging features  of bowel ischemia.  2. Improving large bowel obstruction status post sigmoid colonic  "stent  placement, where there is a known mass.  3. Ill-defined distal pancreatic mass, pathologically proven  adenocarcinoma.  4. Peritoneal carcinomatosis and moderate abdominopelvic ascites.  5. Focal small region of hypoenhancement in the left kidney, consider  correlation with urinalysis and culture for pyelonephritis.       Clinically Significant Risk Factors        # Hypokalemia: Lowest K = 3.2 mmol/L in last 2 days, will replace as needed        # Hypoalbuminemia: Lowest albumin = 2.6 g/dL at 6/7/2025  5:39 AM, will monitor as appropriate     # Hypertension: Noted on problem list            # Overweight: Estimated body mass index is 27.4 kg/m  as calculated from the following:    Height as of this encounter: 1.702 m (5' 7.01\").    Weight as of an earlier encounter on 6/4/25: 79.4 kg (175 lb)., PRESENT ON ADMISSION     # Financial/Environmental Concerns: none         Disposition Plan      Expected Discharge Date: 06/08/2025      Destination: home            "

## 2025-06-07 NOTE — CONSULTS
"Care Management Initial Consult    General Information  Assessment completed with: Patient, VM-chart review, Pt Gamaliel \"Lamont\"  Type of CM/SW Visit: Initial Assessment    Primary Care Provider verified and updated as needed: Yes (Brian Muse [phone] [address] [place] [address], [facility] [address] with [facility])   Readmission within the last 30 days: no previous admission in last 30 days      Reason for Consult: other (see comments) (Elevated Risk Score)  Advance Care Planning: Advance Care Planning Reviewed: concerns discussed (Pt declined to complete HCD at this time)        Communication Assessment  Patient's communication style: spoken language (English or Bilingual)    Hearing Difficulty or Deaf: no   Wear Glasses or Blind: no    Cognitive  Cognitive/Neuro/Behavioral: WDL  Level of Consciousness: alert  Arousal Level: opens eyes spontaneously                Living Environment:   People in home: alone     Current living Arrangements: house      Able to return to prior arrangements: yes     Family/Social Support:  Care provided by: self, child(elizabeth), friend  Provides care for: no one  Marital Status: Single  Support system: Children, Friend          Description of Support System: Supportive, Involved    Support Assessment: Adequate family and caregiver support, Adequate social supports    Current Resources:   Patient receiving home care services: No     Community Resources: None  Equipment currently used at home: none  Supplies currently used at home: None    Employment/Financial:  Employment Status: retired        Financial Concerns: none   Referral to Financial Worker: No     Does the patient's insurance plan have a 3 day qualifying hospital stay waiver?  No    Lifestyle & Psychosocial Needs:  Social Drivers of Health     Food Insecurity: Low Risk  (6/6/2025)    Food Insecurity     Within the past 12 months, did you worry that your food would run out before you got money to buy more?: No     Within the past " 12 months, did the food you bought just not last and you didn t have money to get more?: No   Depression: Not at risk (5/28/2025)    Received from [facility] and [facility]    PHQ-2     PHQ-2 Total: 0   Housing Stability: High Risk (6/6/2025)    Housing Stability     Do you have housing? : Yes     Are you worried about losing your housing?: Yes   Tobacco Use: Medium Risk (5/30/2025)    Patient History     Smoking Tobacco Use: Never     Smokeless Tobacco Use: Former     Passive Exposure: Not on file   Financial Resource Strain: Low Risk  (6/6/2025)    Financial Resource Strain     Within the past 12 months, have you or your family members you live with been unable to get utilities (heat, electricity) when it was really needed?: No   Alcohol Use: Not on file   Transportation Needs: Low Risk  (6/6/2025)    Transportation Needs     Within the past 12 months, has lack of transportation kept you from medical appointments, getting your medicines, non-medical meetings or appointments, work, or from getting things that you need?: No   Physical Activity: Not on file   Interpersonal Safety: Low Risk  (6/6/2025)    Interpersonal Safety     Do you feel physically and emotionally safe where you currently live?: Yes     Within the past 12 months, have you been hit, slapped, kicked or otherwise physically hurt by someone?: No     Within the past 12 months, have you been humiliated or emotionally abused in other ways by your partner or ex-partner?: No   Stress: Not on file   Social Connections: Not on file   Health Literacy: Not on file     Functional Status:  Prior to admission patient needed assistance:   Dependent ADLs:: Independent  Dependent IADLs:: Independent     Mental Health Status:  Mental Health Status: No Current Concerns       Chemical Dependency Status:  Chemical Dependency Status: No Current Concerns           Values/Beliefs:  Spiritual, Cultural Beliefs, Caodaism Practices, Values that affect  "care: yes  Description of Beliefs that Will Affect Care: Confucianism    Cultural/Amish Practices Patient Routinely Participates In: prayer       Discussed  Partnership in Safe Discharge Planning  document with patient/family: Yes    Additional Information:    Per chart review: \"Gamaliel Johnson is a 69 year old male admitted on 6/4/2025 for management of abdominal pain and vomiting found to have large bowel obstruction, likely carcinomatosis, and new PE. Patient had undergone recent umbilical hernia repair on 5/30/25 at which time was seen to have nodularities concerning for malignancy and from which biopsy returned positive for adenocarcinoma. He has a history pertinent for hepatitis C with compensated cirrhosis, HTN, tobacco use, past alcohol use.\"    Care management assessment completed at the bedside with the pt, Gamaliel \"Lamont\", d/t elevated re-admission risk score. RNCC introduced self and explained the nature of the care management assessment. Pt agreed to speak with writer. The pt verified address, PCP, and health insurance in chart is current. Lamont declined to do HCD at this time.    Lamont lives alone in a house in [place], [place]. His two children family, friends, and neighbors are his support system. He is independent with all I/ADLs. He drives himself to all medical appointments. No DME or home care prior. Lamont denied mental health and chemical dependency concerns. Per chart review, his last ETOH consumption on 6/1. He received social security and pension, no financial concerns. His daughter Elsa entered the room as we were almost done with the assessment. No concerns from her. Lamont stated his children to transport him at discharge.    Next Steps: No further care management intervention anticipated at this time.  Please re-consult if further needs arise.  Care management signing off.      Candelaria Gallegos RN    7C RN Coordinator   Weekend Covering RN CC  [facility] Acute Care Management  Phone: [phone]  Searchable " on Juno : 7C Med Surg 7401 thru 7418 RNCC & 7C Med Surg 7502 thru 7521 RNCC

## 2025-06-08 LAB
ALBUMIN UR-MCNC: 10 MG/DL
ANION GAP SERPL CALCULATED.3IONS-SCNC: 13 MMOL/L (ref 7–15)
APPEARANCE UR: CLEAR
BILIRUB UR QL STRIP: NEGATIVE
BUN SERPL-MCNC: 8.8 MG/DL (ref 8–23)
CALCIUM SERPL-MCNC: 7.9 MG/DL (ref 8.8–10.4)
CHLORIDE SERPL-SCNC: 106 MMOL/L (ref 98–107)
COLOR UR AUTO: YELLOW
CREAT SERPL-MCNC: 0.57 MG/DL (ref 0.67–1.17)
EGFRCR SERPLBLD CKD-EPI 2021: >90 ML/MIN/1.73M2
ERYTHROCYTE [DISTWIDTH] IN BLOOD BY AUTOMATED COUNT: 13.5 % (ref 10–15)
GLUCOSE BLDC GLUCOMTR-MCNC: 89 MG/DL (ref 70–99)
GLUCOSE SERPL-MCNC: 99 MG/DL (ref 70–99)
GLUCOSE UR STRIP-MCNC: NEGATIVE MG/DL
HCO3 SERPL-SCNC: 19 MMOL/L (ref 22–29)
HCT VFR BLD AUTO: 31.6 % (ref 40–53)
HGB BLD-MCNC: 10.6 G/DL (ref 13.3–17.7)
HGB UR QL STRIP: NEGATIVE
KETONES UR STRIP-MCNC: 40 MG/DL
LEUKOCYTE ESTERASE UR QL STRIP: NEGATIVE
MCH RBC QN AUTO: 30 PG (ref 26.5–33)
MCHC RBC AUTO-ENTMCNC: 33.5 G/DL (ref 31.5–36.5)
MCV RBC AUTO: 88 FL (ref 78–100)
MCV RBC AUTO: 90 FL (ref 78–100)
MUCOUS THREADS #/AREA URNS LPF: PRESENT /LPF
NITRATE UR QL: NEGATIVE
PH UR STRIP: 6 [PH] (ref 5–7)
PLATELET # BLD AUTO: 306 10E3/UL (ref 150–450)
PLATELET # BLD AUTO: 307 10E3/UL (ref 150–450)
POTASSIUM SERPL-SCNC: 2.8 MMOL/L (ref 3.4–5.3)
POTASSIUM SERPL-SCNC: 3.1 MMOL/L (ref 3.4–5.3)
POTASSIUM SERPL-SCNC: 3.4 MMOL/L (ref 3.4–5.3)
RBC # BLD AUTO: 3.53 10E6/UL (ref 4.4–5.9)
RBC URINE: <1 /HPF
SODIUM SERPL-SCNC: 138 MMOL/L (ref 135–145)
SP GR UR STRIP: 1.01 (ref 1–1.03)
UROBILINOGEN UR STRIP-MCNC: NORMAL MG/DL
WBC # BLD AUTO: 9.4 10E3/UL (ref 4–11)
WBC URINE: 2 /HPF

## 2025-06-08 PROCEDURE — 36415 COLL VENOUS BLD VENIPUNCTURE: CPT

## 2025-06-08 PROCEDURE — 81003 URINALYSIS AUTO W/O SCOPE: CPT

## 2025-06-08 PROCEDURE — 85027 COMPLETE CBC AUTOMATED: CPT | Performed by: PHYSICIAN ASSISTANT

## 2025-06-08 PROCEDURE — 258N000003 HC RX IP 258 OP 636: Performed by: PHYSICIAN ASSISTANT

## 2025-06-08 PROCEDURE — 99233 SBSQ HOSP IP/OBS HIGH 50: CPT | Mod: FS

## 2025-06-08 PROCEDURE — 99223 1ST HOSP IP/OBS HIGH 75: CPT | Performed by: STUDENT IN AN ORGANIZED HEALTH CARE EDUCATION/TRAINING PROGRAM

## 2025-06-08 PROCEDURE — 36415 COLL VENOUS BLD VENIPUNCTURE: CPT | Performed by: PHYSICIAN ASSISTANT

## 2025-06-08 PROCEDURE — 80048 BASIC METABOLIC PNL TOTAL CA: CPT | Performed by: PHYSICIAN ASSISTANT

## 2025-06-08 PROCEDURE — 250N000013 HC RX MED GY IP 250 OP 250 PS 637

## 2025-06-08 PROCEDURE — 99207 PR APP CREDIT; MD BILLING SHARED VISIT: CPT | Performed by: PHYSICIAN ASSISTANT

## 2025-06-08 PROCEDURE — 250N000013 HC RX MED GY IP 250 OP 250 PS 637: Performed by: STUDENT IN AN ORGANIZED HEALTH CARE EDUCATION/TRAINING PROGRAM

## 2025-06-08 PROCEDURE — 84132 ASSAY OF SERUM POTASSIUM: CPT

## 2025-06-08 PROCEDURE — 82435 ASSAY OF BLOOD CHLORIDE: CPT | Performed by: PHYSICIAN ASSISTANT

## 2025-06-08 PROCEDURE — 84132 ASSAY OF SERUM POTASSIUM: CPT | Performed by: PHYSICIAN ASSISTANT

## 2025-06-08 PROCEDURE — 99232 SBSQ HOSP IP/OBS MODERATE 35: CPT | Mod: GC | Performed by: INTERNAL MEDICINE

## 2025-06-08 PROCEDURE — 85049 AUTOMATED PLATELET COUNT: CPT | Performed by: PHYSICIAN ASSISTANT

## 2025-06-08 PROCEDURE — 99418 PROLNG IP/OBS E/M EA 15 MIN: CPT | Performed by: STUDENT IN AN ORGANIZED HEALTH CARE EDUCATION/TRAINING PROGRAM

## 2025-06-08 PROCEDURE — 250N000013 HC RX MED GY IP 250 OP 250 PS 637: Performed by: PHYSICIAN ASSISTANT

## 2025-06-08 PROCEDURE — 120N000002 HC R&B MED SURG/OB UMMC

## 2025-06-08 RX ORDER — LORAZEPAM 2 MG/ML
0.5 CONCENTRATE ORAL EVERY 6 HOURS PRN
Status: DISCONTINUED | OUTPATIENT
Start: 2025-06-08 | End: 2025-06-11

## 2025-06-08 RX ORDER — ENOXAPARIN SODIUM 100 MG/ML
1 INJECTION SUBCUTANEOUS EVERY 12 HOURS
Status: DISCONTINUED | OUTPATIENT
Start: 2025-06-08 | End: 2025-06-09

## 2025-06-08 RX ORDER — POTASSIUM CHLORIDE 750 MG/1
40 TABLET, EXTENDED RELEASE ORAL ONCE
Status: COMPLETED | OUTPATIENT
Start: 2025-06-08 | End: 2025-06-08

## 2025-06-08 RX ADMIN — POTASSIUM CHLORIDE 40 MEQ: 750 TABLET, EXTENDED RELEASE ORAL at 13:56

## 2025-06-08 RX ADMIN — APIXABAN 10 MG: 5 TABLET, FILM COATED ORAL at 08:39

## 2025-06-08 RX ADMIN — POTASSIUM CHLORIDE 40 MEQ: 750 TABLET, EXTENDED RELEASE ORAL at 07:01

## 2025-06-08 RX ADMIN — LORAZEPAM 0.5 MG: 2 LIQUID ORAL at 22:16

## 2025-06-08 RX ADMIN — LISINOPRIL 40 MG: 20 TABLET ORAL at 08:40

## 2025-06-08 RX ADMIN — ACETAMINOPHEN 650 MG: 325 TABLET ORAL at 13:56

## 2025-06-08 RX ADMIN — ACETAMINOPHEN 650 MG: 325 TABLET ORAL at 19:55

## 2025-06-08 RX ADMIN — SODIUM CHLORIDE, SODIUM LACTATE, POTASSIUM CHLORIDE, AND CALCIUM CHLORIDE: .6; .31; .03; .02 INJECTION, SOLUTION INTRAVENOUS at 14:45

## 2025-06-08 ASSESSMENT — ACTIVITIES OF DAILY LIVING (ADL)
ADLS_ACUITY_SCORE: 37

## 2025-06-08 NOTE — CONSULTS
"Palliative Care Consultation Note  [facility]      Patient: Gamaliel Johnson  Date of Admission:  6/4/2025    Requesting Clinician / Team: Med Gold 1  Reason for consult: Goals of care, Patient and family support     Recommendations & Counseling     GOALS OF CARE:   Comfort focused   I would categorize him as \"modified comfort\".  He would still be interested in some procedures that could benefit him symptomatically, such as paracentesis and venting G-tube, however he does not want to pursue chemotherapy, and he brought up going home with hospice several times.  Recommend placing a Social Work consult for hospice to determine next steps.  Would need to be an organization that could follow him in [place].  Depending on ongoing obstruction, we did talk about the possibility of moving to a clear liquid diet in the next day or so and what eating/drinking for comfort would look like.    ADVANCE CARE PLANNING:  Has not completed a healthcare directive. Notes his kids would be his healthcare agents.  There is no POLST form on file, defer to patient and/or next of kin for decisions   Code status: Full Code  Due to length of our conversation on other things today, did not get to address CODE STATUS.  Would recommend following up with this prior to discharge.    MEDICAL MANAGEMENT:   #Anxiety   #Difficulty Sleeping - While he reports that the primary contributor to his poor sleep is multiple awakenings, daughter does note that he has been anxious \"for a very long time\".  We talked about the importance of having options for medications that could be continued if the obstruction does worsen.  -Start Ativan sublingual solution 0.5 mg every 6 hours as needed (ordered for you).  - If at all possible, it would be helpful to patient to group cares overnight (vitals at the same time as lab checks) to try to give more consolidated sleep time.    -Offered palliative care clinical social work " support.  Daughter declined this today feeling it might be a bit overwhelming for him.  I also discussed that our clinical social workers could help with support and conversations with the grandchildren if that would be helpful.  Family appreciated knowing this and will reach out if they think that would be helpful.       *He is thankfully not having much pain right now, though we did talk about options for the future if this changes.  If the obstruction continues to worsen, would change to concentrated oxycodone solution 20 mg/mL and order equivalent dosages.    *He also has ODT Zofran ordered if nausea occurs.    [place] PDMP Checked: Yes    PSYCHOSOCIAL/SPIRITUAL:  Family : daughter and her  live in [place] with their [id number], son and daughter-in-law live in [place] with their 3 children, the oldest of whom is 11 years old.    Palliative Care will continue to follow. Thank you for the consult and allowing us to aid in the care of Gamaliel Johnson.    These recommendations have been discussed through text pages with primary team.    Jennyfer Leung, DO  Securely message with [employer] (more info)  Text page via [employer] Paging/Directory         106 MINUTES SPENT BY ME on the date of service doing chart review, history, exam, documentation & further activities per the note.     Palliative Summary/HPI     Gamaliel Johnson is a 69 year old male admitted on 6/4/2025 for management of abdominal pain and vomiting found to have large bowel obstruction, likely carcinomatosis, and new PE. Patient had undergone recent umbilical hernia repair on 5/30/25 at which time was seen to have nodularities concerning for malignancy and from which biopsy returned positive for adenocarcinoma.  S/p colonoscopy with colonic stent placement 6/5, as well as biopsy of pancreatic mass which showed pancreatic adenocarcinoma.    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     Introduced the role of palliative care as an interdisciplinary team that cares for patients with serious illness to help support symptom management, communication, coping for patients and their families as well as support with medical decision making.    We talked about how significant of a change this is for him.  Prior to all of this, he has been living independently and doing really well.  He went in for his hernia surgery earlier in May, and then all of this was discussed.  He thought he had an obstruction from his hernia.    Reviewed with him his understanding of what all the other medical teams have said.  He says overall its \"not good.  I mean, I cannot eat anymore, what does that tell you?\".  While he says that he would \"like to surprise everybody\", he feels he likely has \"little time left\".    He said after talking with oncology, he feels chemo does not sound like \"the answer\".  He confirms today that he would want to move forward with more of a comfort focused plan of care.  His priorities are being able to be independent at home as well as getting to spend meaningful time with his family.  He has 4 grandchildren ages 11 years down to 11 months.  The only time he became emotional during our conversation was when he talked about not being able to play with them in the same way.    Family asked several good questions about potential interventions that could help.  Mainly they are wondering about a paracentesis.  We also talked about the potential of a venting G-tube.  He knows this was brought up as a way of replacing the NG tube he currently has.  Reviewed that if the colonic obstruction remains somewhat patent, he likely could resume clear liquid diet.  If obstruction remains, we " discussed how venting G-tube can allow patients to drain fluids from the stomach, so if he desired to still drink some things, like juices or coffee, because it is enjoyable for him, he could do so and then drain the substances out with the tube.  He said he likely would be interested in either of these if they were offered.    Reports he is not currently having much pain.  Things have been better since he has been passing gas.  He does still have some liquid stool.  He is not sleeping well, which he mainly attributes to multiple interruptions overnight for labs and vitals.  He had questions about how medications are administered for comfort symptoms including sleep and pain, which we discussed.    We talked a little bit about hospice care.  Daughter-in-law was involved with her grandfather when he went through hospice, so they are somewhat familiar.  Reviewed that it is a visiting service, so additional support from family and friends would likely be needed.  Their overall goal is for him to maintain his independence as long as possible.    Outside of the room, I did talk with daughter and daughter-in-law a little bit longer.  While they currently feel well supported right now, I did offer the involvement of palliative care clinical social work if needed.  Specifically I referenced how close the grandchildren are to [place] and if it would be helpful to have some guidance on conversations with them, our team social workers could be very helpful with that.  They appreciated knowing this.    Prognosis, Goals, & Planning:    Functional Status just prior to this current hospitalization:  Outpatient Palliative Performance Score (PPS) 80%  Some evidence of disease. Full/normal ambulation, self-care & LOC; normal activity/work w/effort; normal or reduced intake.    Prognosis, Goals, and/or Advance Care Planning:  We discussed general treatment options (full/restorative, selective/conservatives, and comfort only/hospice). We  "then discussed how these specifically apply to metastatic pancreatic cancer.  Based on this discussion, he has decided to proceed with more of a comfort focused plan of care.  He is interested in procedures at this point that could provide symptomatic benefit, such as a paracentesis as well as a venting G-tube.  Ultimately he is interested in discharging home with hospice.    Code Status was addressed today:   No would recommend addressing prior to discharge and completing POLST form.    Patient's decision making preferences: shared with support from loved ones        Patient has decision-making capacity today for complex decisions:Intact            Coping, Meaning, & Spirituality:   Mood, coping, and/or meaning in the context of serious illness were addressed today: Yes    Social:   Had been living in his own home independently.  2 children.  4 grandchildren.  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    Medications:  I have reviewed this patient's medication profile and medications from this hospitalization.     Notable medications:   -Oxycodone 2.5 to 5 mg every 4 hours as needed.  - Hydromorphone 0.3 mg IV every 6 hours as needed.  - Zofran 4 mg (ODT or IV) every 6 hours as needed.    ROS:  Comprehensive ROS is reviewed and is negative except as here & per HPI:     Physical Exam   Vital Signs with Ranges  Temp:  [98  F (36.7  C)-99.2  F (37.3  C)] 98  F (36.7  C)  Pulse:  [] 92  Resp:  [18-20] 18  BP: (154-174)/() 168/94  Cuff Mean (mmHg):  [114-120] 120  SpO2:  [93 %-97 %] 96 %  182 lbs 6.4 oz    PHYSICAL EXAM:  Vitals were reviewed  Temp: 98  F (36.7  C) Temp src: Oral BP: (!) 168/94 Pulse: 92   Resp: 18 SpO2: 96 % O2 Device: None (Room air)    Constitutional: Alert, pleasant, no apparent distress. Sitting up in chair.  Eyes: Sclera non-icteric, no eye discharge.  ENT: No nasal discharge. Ears grossly normal.  NG tube in place.  Respiratory: Unlabored respirations. Speaking in full " sentences.  Musculoskeletal: Extremities appear normal- no gross deformities noted. No edema noted on upper body.   Skin: No suspicious lesions or rashes on visible skin.  No jaundice.   Neurologic: Clear speech, no aphasia. No facial droop.  Psychiatric: Mentation appears normal, appropriate attention. Affect normal/bright. Does not appear anxious or depressed.    Data reviewed:  Lab Results   Component Value Date    WBC 9.4 06/08/2025    HGB 10.6 (L) 06/08/2025    HCT 31.6 (L) 06/08/2025     06/08/2025     06/08/2025    POTASSIUM 2.8 (L) 06/08/2025    CHLORIDE 106 06/08/2025    CO2 19 (L) 06/08/2025    BUN 8.8 06/08/2025    CR 0.57 (L) 06/08/2025    GLC 99 06/08/2025    NTBNP 448 (H) 06/05/2025    TROPI <0.015 07/05/2020    AST 21 06/07/2025    ALT 10 06/07/2025    ALKPHOS 66 06/07/2025    BILITOTAL 0.4 06/07/2025    INR 1.36 (H) 06/04/2025     GFR Estimate   Date Value Ref Range Status   06/08/2025 >90 >60 mL/min/1.73m2 Final     Comment:     eGFR calculated using 2021 CKD-EPI equation.   04/09/2021 89 >60 mL/min/[1.73_m2] Final     Comment:     Non  GFR Calc  Starting 12/18/2018, serum creatinine based estimated GFR (eGFR) will be   calculated using the Chronic Kidney Disease Epidemiology Collaboration   (CKD-EPI) equation.       IMAGING: CT AP 6/7/25- Preliminary report- Impression: Compared to prior CT from 6/4/2025, the current scan shows:  1. Interval placement of colonic stent; decreased caliber of the stent in mid part, intraluminal density in the stent; similar persistent air-filled distention of the cecum and the transverse colon, distal obstruction not excluded. Contrast enema may be considered for further evaluation  2. Increased ascites.  3. Small effusions left more than right with associated bibasilar atelectasis/consolidation  4.additional findings similar to prior, focal area of hypoenhancement in the left kidney, consider correlation for pyelonephritis    EKG  6/4/2025- Sinus tachycardia.  Vent rate 107 bpm, QTc 461 ms.

## 2025-06-08 NOTE — PROGRESS NOTES
Hospital Medicine  AXR - per radiology resident - NG tube projects over the stomach.  Plan:  ok to set NG at LIS.  Suleman Maloney MD

## 2025-06-08 NOTE — PROGRESS NOTES
I spoke to the patient for 30 minutes this morning.  I discussed the findings that he has peritoneal metastases from a suspected pancreatic adenocarcinoma.  He initially was felt to have a transition point for possible bowel obstruction in the sigmoid colon.  This was stented by GI.  The patient is passing quite a bit of gas and liquid stool by his report.  Although he continues to be distended, this morning he states that he is less distended than yesterday and less uncomfortable.  There is no definable small bowel obstruction currently on his CT scan from yesterday.    I discussed with the patient that we would not have any plans for surgical intervention given the fact that he has peritoneal disease with ascites.  I discussed that treatment should be focused on palliation of symptoms as they arise.  Currently he is passing gas and liquid stool and therefore I would probably wait another day or 2 before any further investigations assuming that he continues to be less distended over the course of the next 48 to 72 hours.  Once he is not distended any more, if he continues to pass gas and stool, he can likely be discharged on a clear liquid diet.    If the patient continues to have abdominal distention and evidence of obstruction ultimately reevaluation of stent function with an enema or repeat colonoscopy may be necessary.    If he develops a true bowel obstruction, consideration should be given to PEG placement by gastroenterology.    I would probably leave the NG tube today but would consider removing it tomorrow if the patient continues to pass gas and is less distended.  A trial of liquid diet could be entertained after that as well.    Surgical oncology will sign off as we will not be making any plans for surgical intervention in this patient.  Recommend ongoing palliative care support.  All of the above was discussed with the patient during our 30-minute visit today.

## 2025-06-08 NOTE — PROGRESS NOTES
[facility]    Progress Note - Surgical Oncology Service       Date of Admission:  6/4/2025    Assessment & Plan: Neo Alston is a 70 yo M who presents with colonic obstruction in the setting of recently diagnosed metastatic pancreatic adenocarcinoma. On imaging this appears to be most likely a pancreas primary with ascites and carcinomatosis. He underwent colonoscopy which demonstrated polypoid mass causing near complete obstruction, stent placed by GI now with ongoing bowel function and passage of flatus. Biopsy of colonic mass negative for malignancy. Surgical oncology re-engaged to discuss surgical options in light of a new CT scan showing colonic distention and failure of passage of enteric contrast through small bowel although no discrete transition point identified.    Our team with Dr. Leiva had an extensive discussion with the patient today that surgery is not a solution for the patient's obstruction in the setting of metastatic pancreatic cancer and surgical intervention would likely result in nonhealing wounds, ascites leakage, and seeding of the abdominal wall and skin with cancer. Currently the patient has ongoing bowel function with passage of flatus and liquid stool.      Plan:   - No role for surgical intervention in this patient   - Recommend cancer management as dictated by medical oncology team   - Could consider NGT tomorrow if outputs remain low, patient continues pass gas, and overall feels well from an abdominal distention standpoint   - Can consider liquid diet after NGT removal   - If passage of gas and stool decreases or stops, recommend evaluation of colonic stent position with likely contrast enema and per GI recommendations   - If patient develops true malignant bowel obstruction with inability to tolerate PO, nausea and emesis, recommend GI or IR consultation for venting PEG tube placement   - recommend palliative care consult   -  Surgical oncology will sign off at this time       The patient's care was discussed with the Attending Physician, Dr. Leiva.    Byron Catalan MD  [facility]  Non-urgent messages: Securely message with [employer] (more info)  Text page via [facility] Paging/Directory     ______________________________________________________________________    Interval History   Dr. Leiva and I discussed with the patient for about 30 minutes the reasoning behind why surgical intervention is not an option for this patient. With his metastatic disease, surgery is not the appropriate treatment. Additionally, with evidence of ascites on his CT scan, surgical intervention would result in persistent ascites fluid leakage from incisions, poor wound healing, and likely seeding of the incisions with cancer.     Patient states he has been passing copious gas and continues to have loose bowel movements. He feels less distended over the last day since NGT placement although outputs have decreased. Denies nausea or vomiting currently.     Physical Exam   Vital Signs: Temp: 98.4  F (36.9  C) Temp src: Oral BP: (!) 168/99 Pulse: 93   Resp: 20 SpO2: 97 % O2 Device: None (Room air)    Weight: 0 lbs 0 oz  Intake/Output Summary (Last 24 hours) at 6/8/2025 0836  Last data filed at 6/8/2025 0543  Gross per 24 hour   Intake 350 ml   Output 250 ml   Net 100 ml     Constitutional: awake, alert, cooperative, no apparent distress, and appears stated age  Eyes: Lids and lashes normal, sclera clear, conjunctiva normal  Respiratory: No increased work of breathing  Cardiovascular: RRR, hypertensive   GI: abdomen soft, significantly distended, mildly tender diffusely. No rebound tenderness or guarding   Musculoskeletal: full range of motion noted  Neurologic: Awake, alert, oriented to name, place and time.  Cranial nerves II-XII are grossly intact.  Motor is 5 out of 5 bilaterally.           Data   Most Recent 3  CBC's:  Recent Labs   Lab Test 06/08/25  0518 06/07/25  0539 06/06/25  1821   WBC 9.4 9.8 9.9   HGB 10.6* 11.3* 11.3*   MCV 90 91 91    300 262     Most Recent 3 BMP's:  Recent Labs   Lab Test 06/08/25  0518 06/07/25  2154 06/07/25  0539 06/06/25 2001 06/06/25  1608 06/06/25  0916     --  136  --   --  137   POTASSIUM 2.8*  --  3.4  --  3.6 3.2*   CHLORIDE 106  --  107  --   --  106   CO2 19*  --  19*  --   --  21*   BUN 8.8  --  10.5  --   --  13.1   CR 0.57*  --  0.62*  --   --  0.69   ANIONGAP 13  --  10  --   --  10   ELÍAS 7.9*  --  8.0*  --   --  8.5*   GLC 99 106* 134*   < >  --  132*    < > = values in this interval not displayed.     Most Recent 2 LFT's:  Recent Labs   Lab Test 06/07/25  0539 06/05/25  0624   AST 21 20   ALT 10 11   ALKPHOS 66 69   BILITOTAL 0.4 0.5     Most Recent 3 INR's:  Recent Labs   Lab Test 06/04/25  0643 07/05/20  1732 07/05/20  0000   INR 1.36* 1.11 1.2*     Most Recent ESR & CRP:  Recent Labs   Lab Test 06/04/25  0643   CRPI 95.99*

## 2025-06-08 NOTE — PLAN OF CARE
Goal Outcome Evaluation:  Patient lying in bed at the start of the shift with NG in place attached to LIS with brownish drainage noted.. Abdomen still appeared distended, tender to touch and bruises noted. Was seen and rounded by oncology surgery as well as gen surgery teams early this morning and rounded by primary and  palliative care thereafter. Family updated. Ambulating to the bathroom with SBA, having multiple small watery BMs and voiding freely. NPO observed, ice chips for comfort.     P: Possible IR paracentesis tomorrow. Pt requesting not to be bothered unless necessary during the night and would like Ativan at bedtime.

## 2025-06-08 NOTE — PLAN OF CARE
"BP (!) 168/99 (BP Location: Right arm, Cuff Size: Adult Regular)   Pulse 93   Temp 98.4  F (36.9  C) (Oral)   Resp 20   Ht 1.702 m (5' 7.01\")   SpO2 97%   BMI 27.40 kg/m      Neuro: A&Ox4. Calm and cooperative.   Cardiac: SR/ST 90s- 120s. Hypertensive SBP 150s-170s. Goal SBP <180. Afebrile   Respiratory: Sating 97% on RA.  GI/: Voided spontneously in the bathroom. LBM 6/8- diarrhea  Diet/appetite: NPO w/ ice chips.   Activity:  SBA- ambulated to bathroom.   Pain: Denies  Skin: No new deficits noted.  LDA's: L PIV- infusing LR 50mL/hr & NG at LIS.       Plan: Continue with POC. Notify primary team with changes.      Problem: Adult Inpatient Plan of Care  Goal: Plan of Care Review  Description: The Plan of Care Review/Shift note should be completed every shift.  The Outcome Evaluation is a brief statement about your assessment that the patient is improving, declining, or no change.  This information will be displayed automatically on your shift  note.  Outcome: Progressing  Flowsheets (Taken 6/8/2025 0401)  Outcome Evaluation: Pt had NG tube placed in LIS with minimal output. Denies pain and nausea. Slight pressure in abdomen but stated feeling much better. NPO ok with ice chips  Overall Patient Progress: no change  Goal: Patient-Specific Goal (Individualized)  Description: You can add care plan individualizations to a care plan. Examples of Individualization might be:  \"Parent requests to be called daily at 9am for status\", \"I have a hard time hearing out of my right ear\", or \"Do not touch me to wake me up as it startles  me\".  Outcome: Progressing  Goal: Absence of Hospital-Acquired Illness or Injury  Outcome: Progressing  Intervention: Identify and Manage Fall Risk  Recent Flowsheet Documentation  Taken 6/8/2025 0330 by Hina Harris, RN  Safety Promotion/Fall Prevention: safety round/check completed  Taken 6/7/2025 2330 by Hina Harris, RN  Safety Promotion/Fall Prevention: safety round/check " completed  Taken 6/7/2025 2000 by Hina Harris RN  Safety Promotion/Fall Prevention: safety round/check completed  Intervention: Prevent Skin Injury  Recent Flowsheet Documentation  Taken 6/8/2025 0330 by Hina Harris RN  Body Position: position changed independently  Taken 6/7/2025 2330 by Hina Harris RN  Body Position: position changed independently  Taken 6/7/2025 2000 by Hina Harris RN  Body Position: position changed independently  Intervention: Prevent and Manage VTE (Venous Thromboembolism) Risk  Recent Flowsheet Documentation  Taken 6/8/2025 0330 by Hina Harris RN  VTE Prevention/Management:   SCDs off (sequential compression devices)   patient refused intervention  Taken 6/7/2025 2330 by Hina Harris RN  VTE Prevention/Management:   SCDs off (sequential compression devices)   patient refused intervention  Taken 6/7/2025 2000 by Hina Harris RN  VTE Prevention/Management:   SCDs off (sequential compression devices)   patient refused intervention  Intervention: Prevent Infection  Recent Flowsheet Documentation  Taken 6/8/2025 0330 by Hina Harris RN  Infection Prevention:   cohorting utilized   environmental surveillance performed   equipment surfaces disinfected   hand hygiene promoted   personal protective equipment utilized   rest/sleep promoted   single patient room provided  Taken 6/7/2025 2330 by Hina Harris RN  Infection Prevention:   cohorting utilized   environmental surveillance performed   equipment surfaces disinfected   hand hygiene promoted   personal protective equipment utilized   rest/sleep promoted   single patient room provided  Taken 6/7/2025 2000 by Hina Harris RN  Infection Prevention:   cohorting utilized   environmental surveillance performed   equipment surfaces disinfected   hand hygiene promoted   personal protective equipment utilized   rest/sleep promoted   single patient room provided  Goal: Optimal Comfort and Wellbeing  Outcome: Progressing  Intervention: Provide  Person-Centered Care  Recent Flowsheet Documentation  Taken 6/8/2025 0330 by Hina Harris RN  Trust Relationship/Rapport:   care explained   choices provided   emotional support provided   empathic listening provided   questions answered   questions encouraged   reassurance provided   thoughts/feelings acknowledged  Taken 6/7/2025 2330 by Hina Harris RN  Trust Relationship/Rapport:   care explained   choices provided   emotional support provided   empathic listening provided   questions answered   questions encouraged   reassurance provided   thoughts/feelings acknowledged  Taken 6/7/2025 2000 by Hina Harris RN  Trust Relationship/Rapport:   care explained   choices provided   emotional support provided   empathic listening provided   questions answered   questions encouraged   reassurance provided   thoughts/feelings acknowledged  Goal: Readiness for Transition of Care  Outcome: Progressing     Problem: Delirium  Goal: Optimal Coping  Outcome: Progressing  Intervention: Optimize Psychosocial Adjustment to Delirium  Recent Flowsheet Documentation  Taken 6/7/2025 2000 by Hina Harris RN  Family/Support System Care: involvement promoted  Goal: Improved Behavioral Control  Outcome: Progressing  Intervention: Minimize Safety Risk  Recent Flowsheet Documentation  Taken 6/8/2025 0330 by Hina Harris RN  Enhanced Safety Measures:   patient/family teach back on injury risk   pain management   review medications for side effects with activity  Trust Relationship/Rapport:   care explained   choices provided   emotional support provided   empathic listening provided   questions answered   questions encouraged   reassurance provided   thoughts/feelings acknowledged  Taken 6/7/2025 2330 by Hina Harris RN  Enhanced Safety Measures:   patient/family teach back on injury risk   pain management   review medications for side effects with activity  Trust Relationship/Rapport:   care explained   choices provided   emotional  support provided   empathic listening provided   questions answered   questions encouraged   reassurance provided   thoughts/feelings acknowledged  Taken 6/7/2025 2000 by Hina Harris RN  Enhanced Safety Measures:   patient/family teach back on injury risk   pain management   review medications for side effects with activity  Trust Relationship/Rapport:   care explained   choices provided   emotional support provided   empathic listening provided   questions answered   questions encouraged   reassurance provided   thoughts/feelings acknowledged  Goal: Improved Attention and Thought Clarity  Outcome: Progressing  Intervention: Maximize Cognitive Function  Recent Flowsheet Documentation  Taken 6/8/2025 0330 by Hina Harris RN  Sensory Stimulation Regulation: care clustered  Reorientation Measures: clock in view  Taken 6/7/2025 2330 by Hina Harris RN  Sensory Stimulation Regulation: care clustered  Reorientation Measures: clock in view  Taken 6/7/2025 2000 by Hina Harris RN  Sensory Stimulation Regulation: care clustered  Reorientation Measures: clock in view  Goal: Improved Sleep  Outcome: Progressing     Problem: Nausea and Vomiting  Goal: Nausea and Vomiting Relief  Outcome: Progressing  Intervention: Prevent and Manage Nausea and Vomiting  Recent Flowsheet Documentation  Taken 6/8/2025 0330 by Hina Harris RN  Nausea/Vomiting Interventions: (denies) other (see comments)  Taken 6/7/2025 2330 by Hina Harris RN  Nausea/Vomiting Interventions: (denies) other (see comments)  Taken 6/7/2025 2000 by Hina Harris RN  Nausea/Vomiting Interventions: (denies) other (see comments)     Problem: VTE (Venous Thromboembolism)  Goal: Tissue Perfusion  Outcome: Progressing  Intervention: Optimize Tissue Perfusion  Recent Flowsheet Documentation  Taken 6/8/2025 0330 by Hina Harris RN  VTE Prevention/Management:   SCDs off (sequential compression devices)   patient refused intervention  Taken 6/7/2025 2330 by Hina Harris  RN  VTE Prevention/Management:   SCDs off (sequential compression devices)   patient refused intervention  Taken 6/7/2025 2000 by Hina Harris RN  VTE Prevention/Management:   SCDs off (sequential compression devices)   patient refused intervention  Goal: Right Ventricular Function  Outcome: Progressing     Problem: Oncology Care  Goal: Effective Coping  Outcome: Progressing  Intervention: Support and Enhance Coping Strategies  Recent Flowsheet Documentation  Taken 6/7/2025 2000 by Hina Harris RN  Family/Support System Care: involvement promoted  Goal: Improved Activity Tolerance  Outcome: Progressing  Intervention: Promote Improved Energy  Recent Flowsheet Documentation  Taken 6/8/2025 0330 by Hina Harris RN  Activity Management: ambulated to bathroom  Taken 6/7/2025 2330 by Hina Harris RN  Activity Management: ambulated to bathroom  Taken 6/7/2025 2000 by Hina Harris RN  Activity Management: ambulated to bathroom  Goal: Optimal Oral Intake  Outcome: Progressing  Goal: Improved Oral Mucous Membrane Integrity  Outcome: Progressing  Goal: Optimal Pain Control and Function  Outcome: Progressing  Intervention: Prevent or Manage Pain  Recent Flowsheet Documentation  Taken 6/8/2025 0330 by Hina Harris RN  Sensory Stimulation Regulation: care clustered  Bowel Elimination Promotion: privacy promoted  Medication Review/Management: medications reviewed  Taken 6/7/2025 2330 by Hina Harris RN  Sensory Stimulation Regulation: care clustered  Bowel Elimination Promotion: privacy promoted  Medication Review/Management: medications reviewed  Taken 6/7/2025 2000 by Hina Hraris RN  Sensory Stimulation Regulation: care clustered  Bowel Elimination Promotion: privacy promoted  Medication Review/Management: medications reviewed           Goal Outcome Evaluation:           Overall Patient Progress: no changeOverall Patient Progress: no change    Outcome Evaluation: Pt had NG tube placed in LIS with minimal output. Denies  pain and nausea. Slight pressure in abdomen but stated feeling much better. NPO ok with ice chips           [place] Counseling:  I discussed with the patient the risks of imiquimod including but not limited to erythema, scaling, itching, weeping, crusting, and pain.  Patient understands that the inflammatory response to imiquimod is variable from person to person and was educated regarded proper titration schedule.  If flu-like symptoms develop, patient knows to discontinue the medication and contact us.

## 2025-06-08 NOTE — PROGRESS NOTES
Brief Internal Medicine Note:     Was asked to follow up on CT scan abdomen pelvis. Demonstrated low grade/partial small bowel obstruction without discrete  transition point or enteric contrast in the cecum. Moderate ascites, focal small region of hypo enhancement in left kidney - correlate with urinalysis. Discussed with GI who recommended NPO and Gtube along with gen surg consult. Gen surg has seen patient and recs pending.     PLAN:   - Gen surg consulted  - NG to LIS   - NPO   - UA ordered     Sanchez Clifton PA-C   [facility]  Securely message with the Vocera Web Console (learn more here)  Text page via [facility] Paging/Directory
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
## 2025-06-08 NOTE — PROGRESS NOTES
Gastroenterology Consultation  GI Advanced Endoscopy/Pancreaticobiliary Service    Date of Admission:  6/4/2025  Date of Consult  6/4/2025   Reason for consult:     new adenocarcinoma, concern for panc primary, bowel obstruction may be due to carcinomatosis - consideration for stent      Requesting Physician:  Kenan Brooks DO  PATIENT:  Gamaliel Johnson  MRN:         [id number]          ASSESSMENT AND RECOMMENDATIONS:   Assessment:  Gamaliel Johnson is a 69 year old male who was admitted on 6/4/2025 for N/V, abdominal distension, and lack of BM or passing gas since 6/2/25.  Patient had undergone open umbilical hernia repair on 5/30/25.  There was nodularity, induration and omental caking; no immediate path was available the hernia sample and omental biopsy were sent and today they returned as adenocarcinoma of unknown origin.  CT c/a/p shows new PE and possible pancreatic mass, and bowel obstruction around the sigmoid colon.  Patient was transferred from [place], [place] for surg onc, GI, and oncology evaluation.   Patient is clinically stable and well appearing, although does get a little nauseated if he tries to eat or drink anything.  Overall good functional status.     Patient has PMH of HTN, treated HCV, AUD drinking about 6 pack a day for many years, and marijuana use.  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  Daughter is involved and lives in the [facility].  No FH of pancreatic cancer, uncle with colon cancer.    # Umbilical hernia with poorly differentiated adenocarcinoma of unknown origin  # Large bowel obstruction secondary to mass in the sigmoid colon, s/p colonoscopy with stent placement  - Bx without malignancy, although unclear if adequately sampled  #c/f Partial SBO (imaging 6/7/25)  # Peritoneal carcinomatosis  # Distal pancreatic mass, s/p EUS FNA on 6/6/25 with poorly differentiated carcinoma  # New PE, RLL, segmental, with possible infarct    Recommendations:  -  Recommend continuing NGT decompression at least until 6/9  - Recommend Palliative/Onc involvement to discuss GOC  - Based on GOC and Onc plan patient may be candidate for venting G tube   - Anticoagulation per primary  - Appreciate medical and surgical oncology recs.    Thank you for involving us in this patient's care. Please do not hesitate to contact the GI service with any questions or concerns.  The patient was discussed and plan agreed upon with Dr. Barber.    David David MD  Gastroenterology Fellow          History of Present Illness:   Patient is having BMs  Distension is a bit improved with NGT decompression   No further vomiting.             Review of Systems:     A review of systems was performed and is negative except as noted in the HPI           Physical Exam:   Temp: 97.5  F (36.4  C) Temp src: Axillary BP: (!) 159/95 Pulse: 95   Resp: 18 SpO2: 97 % O2 Device: None (Room air)          General Appearance: NAD, pleasant.    HEENT: EOMI. NGT in place  Respiratory: Breathing comfortably on RA  Cardiovascular: Mildly tachycardidc  GI: soft, distended mildly tender surgical site c/d/i, no peritoneal sign  Extremities: No LE edema noted   Neuro: Moving all extremities   Skin: No jaundice rash on exposed areas   Psych: Alert and oriented, appropriate mood and affect, speech coherent / logical    _______________________________________________________________  Data:  Labs and imaging for last 24 hours were independently reviewed and interpreted

## 2025-06-09 LAB
ABO + RH BLD: NORMAL
ACT BLD: 288 SECONDS (ref 74–150)
ACT BLD: 296 SECONDS (ref 74–150)
ACT BLD: 296 SECONDS (ref 74–150)
ACT BLD: 320 SECONDS (ref 74–150)
ALBUMIN SERPL BCG-MCNC: 2.8 G/DL (ref 3.5–5.2)
ALP SERPL-CCNC: 67 U/L (ref 40–150)
ALT SERPL W P-5'-P-CCNC: 19 U/L (ref 0–70)
ANION GAP SERPL CALCULATED.3IONS-SCNC: 20 MMOL/L (ref 7–15)
APTT PPP: 36 SECONDS (ref 22–38)
APTT PPP: >240 SECONDS (ref 22–38)
AST SERPL W P-5'-P-CCNC: 25 U/L (ref 0–45)
BASE EXCESS BLDA CALC-SCNC: -2.7 MMOL/L (ref -3–3)
BASE EXCESS BLDA CALC-SCNC: -3.2 MMOL/L (ref -3–3)
BASE EXCESS BLDA CALC-SCNC: -4 MMOL/L (ref -3–3)
BILIRUB SERPL-MCNC: 1 MG/DL
BLD GP AB SCN SERPL QL: NEGATIVE
BLD PROD TYP BPU: NORMAL
BLOOD COMPONENT TYPE: NORMAL
BUN SERPL-MCNC: 10.5 MG/DL (ref 8–23)
CA-I BLD-MCNC: 4.4 MG/DL (ref 4.4–5.2)
CA-I BLD-MCNC: 4.5 MG/DL (ref 4.4–5.2)
CA-I BLD-MCNC: 4.7 MG/DL (ref 4.4–5.2)
CALCIUM SERPL-MCNC: 8.5 MG/DL (ref 8.8–10.4)
CHLORIDE SERPL-SCNC: 103 MMOL/L (ref 98–107)
CODING SYSTEM: NORMAL
CREAT SERPL-MCNC: 0.66 MG/DL (ref 0.67–1.17)
CROSSMATCH: NORMAL
EGFRCR SERPLBLD CKD-EPI 2021: >90 ML/MIN/1.73M2
ERYTHROCYTE [DISTWIDTH] IN BLOOD BY AUTOMATED COUNT: 13.7 % (ref 10–15)
ERYTHROCYTE [DISTWIDTH] IN BLOOD BY AUTOMATED COUNT: 13.9 % (ref 10–15)
ERYTHROCYTE [DISTWIDTH] IN BLOOD BY AUTOMATED COUNT: 14.1 % (ref 10–15)
FIBRINOGEN PPP-MCNC: 504 MG/DL (ref 170–510)
GLUCOSE BLD-MCNC: 84 MG/DL (ref 70–99)
GLUCOSE BLD-MCNC: 91 MG/DL (ref 70–99)
GLUCOSE BLD-MCNC: 93 MG/DL (ref 70–99)
GLUCOSE SERPL-MCNC: 78 MG/DL (ref 70–99)
HCO3 BLDA-SCNC: 21 MMOL/L (ref 21–28)
HCO3 BLDA-SCNC: 22 MMOL/L (ref 21–28)
HCO3 BLDA-SCNC: 23 MMOL/L (ref 21–28)
HCO3 SERPL-SCNC: 15 MMOL/L (ref 22–29)
HCT VFR BLD AUTO: 26.6 % (ref 40–53)
HCT VFR BLD AUTO: 42.2 % (ref 40–53)
HCT VFR BLD AUTO: 43.2 % (ref 40–53)
HGB BLD-MCNC: 10.8 G/DL (ref 13.3–17.7)
HGB BLD-MCNC: 10.8 G/DL (ref 13.3–17.7)
HGB BLD-MCNC: 11.1 G/DL (ref 13.3–17.7)
HGB BLD-MCNC: 14.2 G/DL (ref 13.3–17.7)
HGB BLD-MCNC: 14.2 G/DL (ref 13.3–17.7)
HGB BLD-MCNC: 8.9 G/DL (ref 13.3–17.7)
INR PPP: 3.62 (ref 0.85–1.15)
ISSUE DATE AND TIME: NORMAL
LACTATE BLD-SCNC: 1.7 MMOL/L (ref 0.7–2)
LACTATE BLD-SCNC: 2 MMOL/L (ref 0.7–2)
LACTATE BLD-SCNC: 2.1 MMOL/L (ref 0.7–2)
LACTATE SERPL-SCNC: 2.5 MMOL/L (ref 0.7–2)
MAGNESIUM SERPL-MCNC: 1.8 MG/DL (ref 1.7–2.3)
MCH RBC QN AUTO: 29.1 PG (ref 26.5–33)
MCH RBC QN AUTO: 29.5 PG (ref 26.5–33)
MCH RBC QN AUTO: 30.2 PG (ref 26.5–33)
MCHC RBC AUTO-ENTMCNC: 32.9 G/DL (ref 31.5–36.5)
MCHC RBC AUTO-ENTMCNC: 33.5 G/DL (ref 31.5–36.5)
MCHC RBC AUTO-ENTMCNC: 33.6 G/DL (ref 31.5–36.5)
MCV RBC AUTO: 88 FL (ref 78–100)
MCV RBC AUTO: 89 FL (ref 78–100)
MCV RBC AUTO: 89 FL (ref 78–100)
MCV RBC AUTO: 90 FL (ref 78–100)
O2/TOTAL GAS SETTING VFR VENT: 32 %
O2/TOTAL GAS SETTING VFR VENT: 37 %
O2/TOTAL GAS SETTING VFR VENT: 92 %
OXYHGB MFR BLDA: 95 % (ref 92–100)
OXYHGB MFR BLDA: 96 % (ref 92–100)
OXYHGB MFR BLDA: 98 % (ref 92–100)
PCO2 BLDA: 37 MM HG (ref 35–45)
PCO2 BLDA: 37 MM HG (ref 35–45)
PCO2 BLDA: 40 MM HG (ref 35–45)
PH BLDA: 7.36 [PH] (ref 7.35–7.45)
PH BLDA: 7.36 [PH] (ref 7.35–7.45)
PH BLDA: 7.38 [PH] (ref 7.35–7.45)
PLATELET # BLD AUTO: 301 10E3/UL (ref 150–450)
PLATELET # BLD AUTO: 363 10E3/UL (ref 150–450)
PLATELET # BLD AUTO: 363 10E3/UL (ref 150–450)
PLATELET # BLD AUTO: 397 10E3/UL (ref 150–450)
PO2 BLDA: 110 MM HG (ref 80–105)
PO2 BLDA: 315 MM HG (ref 80–105)
PO2 BLDA: 85 MM HG (ref 80–105)
POTASSIUM BLD-SCNC: 3.9 MMOL/L (ref 3.4–5.3)
POTASSIUM BLD-SCNC: 4.1 MMOL/L (ref 3.4–5.3)
POTASSIUM BLD-SCNC: 4.3 MMOL/L (ref 3.4–5.3)
POTASSIUM SERPL-SCNC: 3.1 MMOL/L (ref 3.4–5.3)
PROT SERPL-MCNC: 5.8 G/DL (ref 6.4–8.3)
PROTHROMBIN TIME: 35.8 SECONDS (ref 11.8–14.8)
RBC # BLD AUTO: 2.95 10E6/UL (ref 4.4–5.9)
RBC # BLD AUTO: 4.82 10E6/UL (ref 4.4–5.9)
RBC # BLD AUTO: 4.88 10E6/UL (ref 4.4–5.9)
SAO2 % BLDA: 100 % (ref 95–96)
SAO2 % BLDA: 97 % (ref 95–96)
SAO2 % BLDA: 98 % (ref 95–96)
SODIUM BLD-SCNC: 137 MMOL/L (ref 135–145)
SODIUM BLD-SCNC: 138 MMOL/L (ref 135–145)
SODIUM BLD-SCNC: 138 MMOL/L (ref 135–145)
SODIUM SERPL-SCNC: 138 MMOL/L (ref 135–145)
SPECIMEN EXP DATE BLD: NORMAL
UNIT ABO/RH: NORMAL
UNIT NUMBER: NORMAL
UNIT STATUS: NORMAL
UNIT TYPE ISBT: 5100
WBC # BLD AUTO: 3.1 10E3/UL (ref 4–11)
WBC # BLD AUTO: 5.4 10E3/UL (ref 4–11)
WBC # BLD AUTO: 8.1 10E3/UL (ref 4–11)

## 2025-06-09 PROCEDURE — 36415 COLL VENOUS BLD VENIPUNCTURE: CPT | Performed by: PHYSICIAN ASSISTANT

## 2025-06-09 PROCEDURE — 370N000017 HC ANESTHESIA TECHNICAL FEE, PER MIN: Performed by: SURGERY

## 2025-06-09 PROCEDURE — C1763 CONN TISS, NON-HUMAN: HCPCS | Performed by: SURGERY

## 2025-06-09 PROCEDURE — 85041 AUTOMATED RBC COUNT: CPT | Performed by: PHYSICIAN ASSISTANT

## 2025-06-09 PROCEDURE — 88304 TISSUE EXAM BY PATHOLOGIST: CPT | Mod: 26 | Performed by: PATHOLOGY

## 2025-06-09 PROCEDURE — 85347 COAGULATION TIME ACTIVATED: CPT

## 2025-06-09 PROCEDURE — 85730 THROMBOPLASTIN TIME PARTIAL: CPT

## 2025-06-09 PROCEDURE — 250N000011 HC RX IP 250 OP 636: Mod: JW

## 2025-06-09 PROCEDURE — 272N000001 HC OR GENERAL SUPPLY STERILE: Performed by: SURGERY

## 2025-06-09 PROCEDURE — 250N000011 HC RX IP 250 OP 636

## 2025-06-09 PROCEDURE — 250N000009 HC RX 250

## 2025-06-09 PROCEDURE — 99418 PROLNG IP/OBS E/M EA 15 MIN: CPT | Performed by: PHYSICIAN ASSISTANT

## 2025-06-09 PROCEDURE — C1874 STENT, COATED/COV W/DEL SYS: HCPCS | Performed by: SURGERY

## 2025-06-09 PROCEDURE — P9016 RBC LEUKOCYTES REDUCED: HCPCS

## 2025-06-09 PROCEDURE — 86901 BLOOD TYPING SEROLOGIC RH(D): CPT

## 2025-06-09 PROCEDURE — 250N000025 HC SEVOFLURANE, PER MIN: Performed by: SURGERY

## 2025-06-09 PROCEDURE — 85730 THROMBOPLASTIN TIME PARTIAL: CPT | Performed by: PHYSICIAN ASSISTANT

## 2025-06-09 PROCEDURE — 85049 AUTOMATED PLATELET COUNT: CPT | Performed by: PHYSICIAN ASSISTANT

## 2025-06-09 PROCEDURE — C1894 INTRO/SHEATH, NON-LASER: HCPCS | Performed by: SURGERY

## 2025-06-09 PROCEDURE — C1725 CATH, TRANSLUMIN NON-LASER: HCPCS | Performed by: SURGERY

## 2025-06-09 PROCEDURE — 36415 COLL VENOUS BLD VENIPUNCTURE: CPT

## 2025-06-09 PROCEDURE — 86923 COMPATIBILITY TEST ELECTRIC: CPT

## 2025-06-09 PROCEDURE — 99207 PR NO BILLABLE SERVICE THIS VISIT: CPT | Performed by: STUDENT IN AN ORGANIZED HEALTH CARE EDUCATION/TRAINING PROGRAM

## 2025-06-09 PROCEDURE — 360N000084 HC SURGERY LEVEL 4 W/ FLUORO, PER MIN: Performed by: SURGERY

## 2025-06-09 PROCEDURE — 80053 COMPREHEN METABOLIC PANEL: CPT | Performed by: PHYSICIAN ASSISTANT

## 2025-06-09 PROCEDURE — 34201 REMOVAL OF ARTERY CLOT: CPT | Mod: RT | Performed by: SURGERY

## 2025-06-09 PROCEDURE — 83605 ASSAY OF LACTIC ACID: CPT | Performed by: PHYSICIAN ASSISTANT

## 2025-06-09 PROCEDURE — 85014 HEMATOCRIT: CPT

## 2025-06-09 PROCEDURE — C1769 GUIDE WIRE: HCPCS | Performed by: SURGERY

## 2025-06-09 PROCEDURE — 82330 ASSAY OF CALCIUM: CPT

## 2025-06-09 PROCEDURE — 88304 TISSUE EXAM BY PATHOLOGIST: CPT | Mod: TC | Performed by: SURGERY

## 2025-06-09 PROCEDURE — 250N000013 HC RX MED GY IP 250 OP 250 PS 637

## 2025-06-09 PROCEDURE — 85384 FIBRINOGEN ACTIVITY: CPT | Performed by: PHYSICIAN ASSISTANT

## 2025-06-09 PROCEDURE — 84132 ASSAY OF SERUM POTASSIUM: CPT

## 2025-06-09 PROCEDURE — 85027 COMPLETE CBC AUTOMATED: CPT

## 2025-06-09 PROCEDURE — 83735 ASSAY OF MAGNESIUM: CPT | Performed by: ANESTHESIOLOGY

## 2025-06-09 PROCEDURE — 27601 DECOMPRESSION OF LOWER LEG: CPT | Mod: RT | Performed by: SURGERY

## 2025-06-09 PROCEDURE — 99232 SBSQ HOSP IP/OBS MODERATE 35: CPT | Mod: GC | Performed by: INTERNAL MEDICINE

## 2025-06-09 PROCEDURE — 258N000003 HC RX IP 258 OP 636: Performed by: PHYSICIAN ASSISTANT

## 2025-06-09 PROCEDURE — 250N000011 HC RX IP 250 OP 636: Mod: JZ

## 2025-06-09 PROCEDURE — 710N000010 HC RECOVERY PHASE 1, LEVEL 2, PER MIN: Performed by: SURGERY

## 2025-06-09 PROCEDURE — 86923 COMPATIBILITY TEST ELECTRIC: CPT | Performed by: NURSE PRACTITIONER

## 2025-06-09 PROCEDURE — 85018 HEMOGLOBIN: CPT | Performed by: PHYSICIAN ASSISTANT

## 2025-06-09 PROCEDURE — 85610 PROTHROMBIN TIME: CPT | Performed by: PHYSICIAN ASSISTANT

## 2025-06-09 PROCEDURE — C1757 CATH, THROMBECTOMY/EMBOLECT: HCPCS | Performed by: SURGERY

## 2025-06-09 PROCEDURE — 85018 HEMOGLOBIN: CPT

## 2025-06-09 PROCEDURE — 120N000003 HC R&B IMCU UMMC

## 2025-06-09 PROCEDURE — 250N000011 HC RX IP 250 OP 636: Mod: JZ | Performed by: PHYSICIAN ASSISTANT

## 2025-06-09 PROCEDURE — 37221 PR REVASC ILIAC ART, ANGIO/STENT, INIT VESSEL: CPT | Mod: RT | Performed by: SURGERY

## 2025-06-09 PROCEDURE — 255N000002 HC RX 255 OP 636: Performed by: SURGERY

## 2025-06-09 PROCEDURE — C1887 CATHETER, GUIDING: HCPCS | Performed by: SURGERY

## 2025-06-09 PROCEDURE — 250N000009 HC RX 250: Performed by: SURGERY

## 2025-06-09 PROCEDURE — 99233 SBSQ HOSP IP/OBS HIGH 50: CPT | Performed by: PHYSICIAN ASSISTANT

## 2025-06-09 PROCEDURE — 34203 REMOVAL OF LEG ARTERY CLOT: CPT | Mod: RT | Performed by: SURGERY

## 2025-06-09 PROCEDURE — 99222 1ST HOSP IP/OBS MODERATE 55: CPT | Mod: 25 | Performed by: SURGERY

## 2025-06-09 PROCEDURE — 999N000128 HC STATISTIC PERIPHERAL IV START W/O US GUIDANCE

## 2025-06-09 DEVICE — BX2 HEP REDUCED PROFILE BX2 8MMX29MM 7FR 135CM CATH
Type: IMPLANTABLE DEVICE | Site: LEG | Status: FUNCTIONAL
Brand: GORE VIABAHN VBX BALLOON EXPANDABLE ENDO

## 2025-06-09 DEVICE — GRAFT PATCH VASC XENOSURE BIOLOGIC 0.8X08CM E0.8P8: Type: IMPLANTABLE DEVICE | Site: LEG | Status: FUNCTIONAL

## 2025-06-09 RX ORDER — FENTANYL CITRATE 50 UG/ML
25 INJECTION, SOLUTION INTRAMUSCULAR; INTRAVENOUS EVERY 5 MIN PRN
Status: DISCONTINUED | OUTPATIENT
Start: 2025-06-09 | End: 2025-06-09 | Stop reason: HOSPADM

## 2025-06-09 RX ORDER — IOPAMIDOL 510 MG/ML
INJECTION, SOLUTION INTRAVASCULAR PRN
Status: DISCONTINUED | OUTPATIENT
Start: 2025-06-09 | End: 2025-06-09 | Stop reason: HOSPADM

## 2025-06-09 RX ORDER — HYDROMORPHONE HCL IN WATER/PF 6 MG/30 ML
0.4 PATIENT CONTROLLED ANALGESIA SYRINGE INTRAVENOUS
Status: DISCONTINUED | OUTPATIENT
Start: 2025-06-09 | End: 2025-06-11 | Stop reason: HOSPADM

## 2025-06-09 RX ORDER — HEPARIN SODIUM 10000 [USP'U]/100ML
0-5000 INJECTION, SOLUTION INTRAVENOUS CONTINUOUS
Status: DISCONTINUED | OUTPATIENT
Start: 2025-06-09 | End: 2025-06-11

## 2025-06-09 RX ORDER — HEPARIN SODIUM 10000 [USP'U]/100ML
0-5000 INJECTION, SOLUTION INTRAVENOUS CONTINUOUS
Status: DISCONTINUED | OUTPATIENT
Start: 2025-06-09 | End: 2025-06-09

## 2025-06-09 RX ORDER — CEFAZOLIN SODIUM 2 G/50ML
2 SOLUTION INTRAVENOUS EVERY 8 HOURS
Status: COMPLETED | OUTPATIENT
Start: 2025-06-09 | End: 2025-06-10

## 2025-06-09 RX ORDER — HYDROMORPHONE HYDROCHLORIDE 1 MG/ML
INJECTION, SOLUTION INTRAMUSCULAR; INTRAVENOUS; SUBCUTANEOUS
Status: DISCONTINUED
Start: 2025-06-09 | End: 2025-06-09 | Stop reason: HOSPADM

## 2025-06-09 RX ORDER — ONDANSETRON 4 MG/1
4 TABLET, ORALLY DISINTEGRATING ORAL EVERY 30 MIN PRN
Status: DISCONTINUED | OUTPATIENT
Start: 2025-06-09 | End: 2025-06-09 | Stop reason: HOSPADM

## 2025-06-09 RX ORDER — FENTANYL CITRATE 50 UG/ML
50 INJECTION, SOLUTION INTRAMUSCULAR; INTRAVENOUS EVERY 5 MIN PRN
Status: DISCONTINUED | OUTPATIENT
Start: 2025-06-09 | End: 2025-06-09 | Stop reason: HOSPADM

## 2025-06-09 RX ORDER — ONDANSETRON 2 MG/ML
4 INJECTION INTRAMUSCULAR; INTRAVENOUS EVERY 30 MIN PRN
Status: DISCONTINUED | OUTPATIENT
Start: 2025-06-09 | End: 2025-06-09 | Stop reason: HOSPADM

## 2025-06-09 RX ORDER — LIDOCAINE HYDROCHLORIDE 10 MG/ML
10 INJECTION, SOLUTION EPIDURAL; INFILTRATION; INTRACAUDAL; PERINEURAL ONCE
Status: COMPLETED | OUTPATIENT
Start: 2025-06-09 | End: 2025-06-09

## 2025-06-09 RX ORDER — ENOXAPARIN SODIUM 100 MG/ML
1 INJECTION SUBCUTANEOUS EVERY 12 HOURS
Status: DISCONTINUED | OUTPATIENT
Start: 2025-06-09 | End: 2025-06-09

## 2025-06-09 RX ORDER — HYDROMORPHONE HCL IN WATER/PF 6 MG/30 ML
0.2 PATIENT CONTROLLED ANALGESIA SYRINGE INTRAVENOUS EVERY 5 MIN PRN
Status: DISCONTINUED | OUTPATIENT
Start: 2025-06-09 | End: 2025-06-09 | Stop reason: HOSPADM

## 2025-06-09 RX ORDER — HYDROMORPHONE HYDROCHLORIDE 1 MG/ML
0.3 INJECTION, SOLUTION INTRAMUSCULAR; INTRAVENOUS; SUBCUTANEOUS
Status: DISCONTINUED | OUTPATIENT
Start: 2025-06-09 | End: 2025-06-09

## 2025-06-09 RX ORDER — HYDROMORPHONE HYDROCHLORIDE 1 MG/ML
0.5 INJECTION, SOLUTION INTRAMUSCULAR; INTRAVENOUS; SUBCUTANEOUS ONCE
Status: COMPLETED | OUTPATIENT
Start: 2025-06-09 | End: 2025-06-09

## 2025-06-09 RX ORDER — NALOXONE HYDROCHLORIDE 0.4 MG/ML
0.1 INJECTION, SOLUTION INTRAMUSCULAR; INTRAVENOUS; SUBCUTANEOUS
Status: DISCONTINUED | OUTPATIENT
Start: 2025-06-09 | End: 2025-06-09 | Stop reason: HOSPADM

## 2025-06-09 RX ORDER — HYDROMORPHONE HCL IN WATER/PF 6 MG/30 ML
0.4 PATIENT CONTROLLED ANALGESIA SYRINGE INTRAVENOUS EVERY 5 MIN PRN
Status: DISCONTINUED | OUTPATIENT
Start: 2025-06-09 | End: 2025-06-09 | Stop reason: HOSPADM

## 2025-06-09 RX ORDER — HYDROMORPHONE HCL IN WATER/PF 6 MG/30 ML
0.2 PATIENT CONTROLLED ANALGESIA SYRINGE INTRAVENOUS
Status: DISCONTINUED | OUTPATIENT
Start: 2025-06-09 | End: 2025-06-11 | Stop reason: HOSPADM

## 2025-06-09 RX ORDER — LIDOCAINE 40 MG/G
CREAM TOPICAL
Status: DISCONTINUED | OUTPATIENT
Start: 2025-06-09 | End: 2025-06-09

## 2025-06-09 RX ORDER — DEXAMETHASONE SODIUM PHOSPHATE 4 MG/ML
4 INJECTION, SOLUTION INTRA-ARTICULAR; INTRALESIONAL; INTRAMUSCULAR; INTRAVENOUS; SOFT TISSUE
Status: DISCONTINUED | OUTPATIENT
Start: 2025-06-09 | End: 2025-06-09 | Stop reason: HOSPADM

## 2025-06-09 RX ORDER — CEFAZOLIN SODIUM 2 G/50ML
2 SOLUTION INTRAVENOUS
OUTPATIENT
Start: 2025-06-09

## 2025-06-09 RX ADMIN — HEPARIN SODIUM 1150 UNITS/HR: 10000 INJECTION, SOLUTION INTRAVENOUS at 23:46

## 2025-06-09 RX ADMIN — HYDROMORPHONE HYDROCHLORIDE 0.3 MG: 1 INJECTION, SOLUTION INTRAMUSCULAR; INTRAVENOUS; SUBCUTANEOUS at 05:20

## 2025-06-09 RX ADMIN — HEPARIN SODIUM 1450 UNITS/HR: 10000 INJECTION, SOLUTION INTRAVENOUS at 10:12

## 2025-06-09 RX ADMIN — ACETAMINOPHEN 650 MG: 325 TABLET ORAL at 20:54

## 2025-06-09 RX ADMIN — SODIUM CHLORIDE, SODIUM LACTATE, POTASSIUM CHLORIDE, AND CALCIUM CHLORIDE: .6; .31; .03; .02 INJECTION, SOLUTION INTRAVENOUS at 03:36

## 2025-06-09 RX ADMIN — OXYCODONE HYDROCHLORIDE 5 MG: 5 TABLET ORAL at 08:57

## 2025-06-09 RX ADMIN — LIDOCAINE HYDROCHLORIDE 10 ML: 10 INJECTION, SOLUTION EPIDURAL; INFILTRATION; INTRACAUDAL; PERINEURAL at 21:57

## 2025-06-09 RX ADMIN — HYDROMORPHONE HYDROCHLORIDE 0.5 MG: 1 INJECTION, SOLUTION INTRAMUSCULAR; INTRAVENOUS; SUBCUTANEOUS at 09:54

## 2025-06-09 RX ADMIN — HYDROMORPHONE HYDROCHLORIDE 0.4 MG: 0.2 INJECTION, SOLUTION INTRAMUSCULAR; INTRAVENOUS; SUBCUTANEOUS at 21:22

## 2025-06-09 RX ADMIN — SODIUM CHLORIDE, SODIUM LACTATE, POTASSIUM CHLORIDE, AND CALCIUM CHLORIDE: .6; .31; .03; .02 INJECTION, SOLUTION INTRAVENOUS at 23:45

## 2025-06-09 ASSESSMENT — ACTIVITIES OF DAILY LIVING (ADL)
ADLS_ACUITY_SCORE: 37
ADLS_ACUITY_SCORE: 41
ADLS_ACUITY_SCORE: 41
ADLS_ACUITY_SCORE: 37
ADLS_ACUITY_SCORE: 41
ADLS_ACUITY_SCORE: 37
ADLS_ACUITY_SCORE: 37
ADLS_ACUITY_SCORE: 41
ADLS_ACUITY_SCORE: 37
ADLS_ACUITY_SCORE: 41
ADLS_ACUITY_SCORE: 37
ADLS_ACUITY_SCORE: 41
ADLS_ACUITY_SCORE: 41
ADLS_ACUITY_SCORE: 37
ADLS_ACUITY_SCORE: 41

## 2025-06-09 NOTE — PROGRESS NOTES
"CLINICAL NUTRITION SERVICES    Pt with positive malnutrition risk screen. Chart reviewed. Pt is currently NPO for possible bowel obstruction and has plans to move forward with a comfort focused approach and discharge home with hospice. No nutrition interventions planned currently. RD can be consulted if needed.    RD signing off on 6/9/2025.    Jose Martin Basurto RD, LD  Available on Vocera  Weekend/Holiday RD Vocera - \"Weekend Clinical Dietitian\"   "

## 2025-06-09 NOTE — PLAN OF CARE
Goal Outcome Evaluation:      Plan of Care Reviewed With: patient, child    Overall Patient Progress: decliningOverall Patient Progress: declining    Outcome evaluation:  Complaints right away this morning of increased distension in belly and not being able to pass gas or have a bowel movement.  400 ml emptied from night shift NGT output.  125 ml clear brown fluid emptied at 1045.  At 0830 patient complained of pain in right lower extremity along with foot feeling cold.  Unable to palpate pulses. Called MD.  Attempted to Doppler pulses in right LE with NO success. Vascular surgery consulted and plan to take to OR for emergent thrombectomy.  Heparin gtt started at 1015 with loading dose.  2nd PIV started in right arm.  Latest labs obtained after heparin gtt started.            Shift Hours: 0700 - 1500    Assessment:  Body systems that were not at patient's baseline Respiratory, Cardiac, Peripheral Neurovascular, Gastrointestinal, Genitourinary, and Skin. Focused body system assessments documented in flowsheets.        Activity     Fall Risk Score: 45   Bed alarm on? Yes     Activity Assistance Provided: assistance, stand-by      Assistive Device Utilized: other (see comments) (IV pole)    Pain: 10/10, po oxy given, iv dilaudid given. Appeared to get relief with IV dilaudid    Labs/RN Managed Protocols: High intensity heparin gtt started at 1015am with loading dose.  Next PTT due at 1615.     Lines/Drains: NGT to LIS    Nutrition: NPO        Barriers to Discharge:   Critical illness

## 2025-06-09 NOTE — ANESTHESIA PREPROCEDURE EVALUATION
Anesthesia Pre-Procedure Evaluation    Patient: Gamaliel Johnson   MRN: [id number] : 1955          Procedure : Procedure(s):  Thrombectomy lower extremity  Angiogram  Fasciotomy lower extremity         Past Medical History:   Diagnosis Date    Compensated HCV cirrhosis (H)     Hepatitis C       Past Surgical History:   Procedure Laterality Date    COLONOSCOPY      COLONOSCOPY N/A 2015    Procedure: COLONOSCOPY;  Surgeon: Efren Hernandez MD;  Location: [facility]    COLONOSCOPY N/A 2025    Procedure: COLONOSCOPY, WITH COLONIC STENT INSERTION;  Surgeon: Guru Katelynn Barber MD;  Location: [facility]    ESOPHAGOSCOPY, GASTROSCOPY, DUODENOSCOPY (EGD), COMBINED N/A 2025    Procedure: ESOPHAGOGASTRODUODENOSCOPY, WITH FINE NEEDLE ASPIRATION BIOPSY, WITH ENDOSCOPIC ULTRASOUND GUIDANCE;  Surgeon: Guru Katelynn Barber MD;  Location: [facility] OR    HERNIORRHAPHY UMBILICAL N/A 2025    Procedure: Open umbilical hernia repair, umbilical mass biopsy and omental biopsy;  Surgeon: Rainer Brenner MD;  Location: [facility]    VASECTOMY Bilateral       Allergies   Allergen Reactions    Sulfa Antibiotics       Social History     Tobacco Use    Smoking status: Never    Smokeless tobacco: Former     Types: Chew    Tobacco comments:     declined quit plan   Substance Use Topics    Alcohol use: Yes     Comment: cut down to once a week      Wt Readings from Last 1 Encounters:   25 81.1 kg (178 lb 12.7 oz)        Anesthesia Evaluation   Pt has had prior anesthetic. Type: General and MAC.        ROS/MED HX  ENT/Pulmonary:     (+)     KATE risk factors,  hypertension,         tobacco use (chewing tobacco), Current use,                       Neurologic: Comment: Hx of Intraparenchymal hemorrhage of brain   Alcohol related seizure (H) 2023 last neurology visit    On keppra     (+)       seizures (),                         Cardiovascular:     (+)  hypertension-range: lisinopril  134/80 on 5/28/ -   -  - -                                 Previous cardiac testing   Echo: Date: 6/5/2025 Results:  Global and regional left ventricular function is normal with an EF of 60-65%.  Global right ventricular function is normal.  IVC diameter <2.1 cm collapsing >50% with sniff suggests a normal RA pressure  of 3 mmHg.  No pericardial effusion is present.  There is no prior study for direct comparison.    Stress Test:  Date: Results:    ECG Reviewed:  Date: 6/4/2025 Results:  NSR,     Cath:  Date: Results:      METS/Exercise Tolerance: >4 METS    Hematologic:  - neg hematologic  ROS     Musculoskeletal: Comment: Dupuytren's contractures of both palms - neg musculoskeletal ROS     GI/Hepatic: Comment: Pancreatic CA with concern for malignant large bowel obstruction.      (+)     hepatitis resolved      hepatitis type C, liver disease,       Renal/Genitourinary:  - neg Renal ROS     Endo:  - neg endo ROS     Psychiatric/Substance Use: Comment: Per 5/28/25 notes: History of alcohol related seizures. Quit drinking about a year ago. No seizures, but feel safer taking 1/2 dose of his Keppra at night.    (+)   alcohol abuse  Recreational drug usage: Cannabis.    Infectious Disease:  - neg infectious disease ROS     Malignancy:  - neg malignancy ROS     Other:  - neg other ROS            Physical Exam  Airway  Mallampati: II  TM distance: >3 FB  Neck ROM: full  Mouth opening: >= 4 cm    Cardiovascular   Rhythm: regular  Rate: tachycardia     Dental   (+) Modest Abnormalities - crowns, retainers, 1 or 2 missing teeth      Pulmonary Breath sounds clear to auscultation        Neurological   He appears awake.    Other Findings NGT in place      OUTSIDE LABS:  CBC:   Lab Results   Component Value Date    WBC 3.1 (L) 06/09/2025    WBC 9.4 06/08/2025    HGB 14.2 06/09/2025    HGB 10.6 (L) 06/08/2025    HCT 43.2 06/09/2025    HCT 31.6 (L) 06/08/2025     06/09/2025     06/09/2025     BMP:   Lab Results    Component Value Date     06/09/2025     06/08/2025    POTASSIUM 3.1 (L) 06/09/2025    POTASSIUM 3.4 06/08/2025    CHLORIDE 103 06/09/2025    CHLORIDE 106 06/08/2025    CO2 15 (L) 06/09/2025    CO2 19 (L) 06/08/2025    BUN 10.5 06/09/2025    BUN 8.8 06/08/2025    CR 0.66 (L) 06/09/2025    CR 0.57 (L) 06/08/2025    GLC 78 06/09/2025    GLC 89 06/08/2025     COAGS:   Lab Results   Component Value Date    PTT 36 06/09/2025    INR 3.62 (H) 06/09/2025    FIBR 504 06/09/2025     POC:   Lab Results   Component Value Date     (H) 07/05/2020     HEPATIC:   Lab Results   Component Value Date    ALBUMIN 2.8 (L) 06/09/2025    PROTTOTAL 5.8 (L) 06/09/2025    ALT 19 06/09/2025    AST 25 06/09/2025    ALKPHOS 67 06/09/2025    BILITOTAL 1.0 06/09/2025     OTHER:   Lab Results   Component Value Date    LACT 2.5 (H) 06/09/2025    A1C 6.0 (H) 06/04/2025    ELÍAS 8.5 (L) 06/09/2025    MAG 2.0 06/05/2025    LIPASE 41 06/04/2025    TSH 1.44 04/08/2021       Anesthesia Plan    ASA Status:  4, emergent       Anesthesia Type: General.  Airway: oral.  Induction: intravenous, rapid sequence.  Maintenance: Balanced.   Techniques and Equipment:       - Monitoring Plan: standard ASA monitoring, arterial line kit, processed EEG monitor     Consents    Anesthesia Plan(s) and associated risks, benefits, and realistic alternatives discussed. Questions answered and patient/representative(s) expressed understanding.     - Discussed:     - Discussed with:  Patient        - Pt is DNR/DNI Status: no DNR     Blood Consent:      - Discussed with: patient.     Postoperative Care    Pain management: non-narcotic analgesics, plan for postoperative opioid use.     Comments:    Other Comments: I discussed the risks and benefits of general anesthesia with the patient.  Questions were sought and answered.      Guerrero Singh MD  Attending Anesthesiologist                  Guerrero Singh MD    I have reviewed the pertinent notes and  "labs in the chart from the past 30 days and (re)examined the patient.  Any updates or changes from those notes are reflected in this note.    Clinically Significant Risk Factors        # Hypokalemia: Lowest K = 2.8 mmol/L in last 2 days, will replace as needed       # Anion Gap Metabolic Acidosis: Highest Anion Gap = 20 mmol/L in last 2 days, will monitor and treat as appropriate  # Hypoalbuminemia: Lowest albumin = 2.6 g/dL at 6/7/2025  5:39 AM, will monitor as appropriate  # Coagulation Defect: INR = 3.62 (Ref range: 0.85 - 1.15) and/or PTT = 36 Seconds (Ref range: 22 - 38 Seconds), will monitor for bleeding    # Hypertension: Noted on problem list            # Overweight: Estimated body mass index is 28 kg/m  as calculated from the following:    Height as of this encounter: 1.702 m (5' 7.01\").    Weight as of this encounter: 81.1 kg (178 lb 12.7 oz).      # Financial/Environmental Concerns: none               "

## 2025-06-09 NOTE — PROGRESS NOTES
PALLIATIVE CARE PROGRESS NOTE  [facility]     Patient Name: Gamaliel Johnson  Date of Admission: 6/4/2025   Today the patient was seen for: Goals of care, symptoms, support     Recommendations & Counseling     GOALS OF CARE:   Medical Summary: 69 y.o. with hx pancreatic adenocarcinoma c/b peritoneal metastases with ascites. Admitted with concerns for malignant SBO. Per surgery note 6/8, uncertain if patient had a true bowel obstruction and the plan was to give more time, advance clear diet and see how things went. 6/9 patient experienced acute limb ischemia requiring emergency surgery. Post-operative course c/b  acute decompensation requiring transfer to ICU this morning for intubation and pressors.   Restorative with limits : Family is currently deliberating transitioning the patient to comfort care measures. Provided recommendation of DNR/DNI in the interim - family would like to think about this further and will let bedside RN know if they want to update this.   SICU provided medical updates to son and daughter today, including his acute decompensation this morning and how this was likely related to the bowel perforation discovered on his CT this afternoon.   Reviewed prior plan for discharge home with hospice to allow the patient to spend time with family in [place]. Unfortunately, due to the patient s acute decompensation, it is unlikely he will be able to leave the hospital.   Discussed transitioning to comfort care with the family. The ICU team is hopeful that, since the patient is off vasopressors and on low ventilator settings, the family may have some time (possibly days) with the patient following this transition.     ADVANCE CARE PLANNING:  Has not completed a healthcare directive. Notes his kids would be his healthcare agents.  There is no POLST form on file, would recommend completing this prior to discharge.   Code status: Full Code - discussed code  status with family today - they are going to take time to discuss among themselves.     MEDICAL MANAGEMENT:     Patient is currently NOT on comfort cares. However, if family decide to transition to comfort-focused care, recommend consideration of the following.   Family request that prior to transition to comfort-focused care, we discuss the plan with Gamaliel, specifically the impact that use of opioids/benzodiazepines may have on his mental status. During meeting today, we discussed that more aggressively treating symptoms with these medications may cause patient's to be more sedated, which could impact ability to engage in conversations with family.  Some patients opt to use less of these while accepting more symptom burden so that they can be more alert.   Turn off all monitors. Stop vitals, labs, ordered studies, procedures.   Lines, drains, tubes, restraints:  Can consider continuing urinary catheters and fecal management systems with patient's comfort in mind. Otherwise, discontinue all other lines.   Recommend to discontinue tube feeds and IVF. Patient can be offered food/fluids for comfort by mouth if appropriate - discussed that PO intake may cause more pain given his bowel perforation. However, daughter states patient has been asking for orange juice all admission - this can likely be done if really important to patient/family.   Protocol for compassionate extubation:    If alertness is important to patient and family, consider titrating down drips to RASS of 0 or -1 prior to vent withdrawal.   PRIOR to discontinuation of ventilator, pre-medicate with the following: Glycopyrrolate 0.2 mg 30-60 min before, opioid of choice, and benzodiazepine of choice 30 minutes prior to extubation (I.e. Dilaudid 0.5 mg IV or Lorazepam 0.5-1mg)   If symptoms are inadequately controlled at any point, increase opioid and/or benzodiazepine by %  Pain/Dyspnea:   APAP 975mg TID  1st choice: Oxycodone (High Concentration  Solution) SL/PO 5-10mg q2h (can use SL if unable to take PO)   2nd choice: Dilaudid IV 0.5-1mg q1h prn for breakthrough pain not managed with PO medications  Lorazepam 0.5mg SL/IV q4h prn anxiety from dyspnea if opioid is not effective  Haldol 2 mg SL/IV q6h prn   Secretions:   Discontinue or reduce IV fluid and enteral feeding  Gentle oropharyngeal suctioning may be used but avoid deep suctioning  Positioning - Reposition the body in a lateral position on either left or right side to facilitate drainage.  Glycopyrrolate IV 0.2mg q4h prn (can increase up to 0.4mg q4h prn)  Atropine drops SL 1-2 drops q4h prn   If above ineffective, could consider scopolamine transdermal patch  Nausea/Vomiting:   Zofran 4mg PO/IV q6h prn - can increase up to 8mg  Compazine 10mg PO/IV q6h prn  Haldol 2mg SL/IV q6h prn   Bowel Regimen:   Dulcolax 10mg rectal daily prn     PSYCHOSOCIAL/SPIRITUAL:  [place] community: [facility] - did not assess today. Consider discussion before extubation if having a  visit is appropriate for patient  Support system: daughter and her  live in [place] with their [id number]; son and daughter-in-law live in [place] with their 3 children, the oldest of whom is 11 years old.    Palliative care will continue to follow patient. If any urgent needs arise please reach out to our team via Sw[employer] Of The Month.     These recommendations have been discussed with SICU, bedside RN, unit SW.    MALDONADO Hedz CNP  [facility], Palliative Care  Securely message with the Swag Of The Month Web Console (learn more here) or  Text page via [facility] Paging/Directory        Assessment          Gamaliel Johnson is a 69 year old male admitted on 6/4/2025 for management of abdominal pain and vomiting found to have large bowel obstruction, likely carcinomatosis, and new PE. Patient had undergone recent umbilical hernia repair on 5/30/25 at which time was seen to have nodularities concerning for malignancy and from which biopsy returned  positive for adenocarcinoma.  S/p colonoscopy with colonic stent placement 6/5, as well as biopsy of pancreatic mass which showed pancreatic adenocarcinoma.     Today, the patient was seen for:  Palliative care encounter  Support  Goals of care       Interval History:     Multidisciplinary collaboration:  Chart reviewed. Discussed in rounds this AM with SICU team. Care conference this afternoon with SICU attending, unit MACI, PCSW, and patient's son and daughter.     SICU provided updates (see above).   Reviewed previous plan for hospice, how things are in a different place. Daughter asking about options moving forward, and if hospice is still on the table.     Discussed with the patient and their family what transitioning to comfort-focused care would look like, including discontinuation of IV fluids, cardiac monitoring, labs, and other interventions that do not directly promote comfort.  Anticipatory guidance was given regarding feeding, hunger, fluids at end of life. Discussed that this process is very purposeful in terms of ensuring patient is as comfortable as possible and that family wishes are honored. Discussed process of withdrawing the ventilator, including the use of medications to help with air hunger/dyspnea. Discussed that these medications may cause drowsiness but will ensure you remain comfortable without the need for a machine. We also discussed that some patients prefer to prioritize alertness, thus chose to use less of these medications for symptoms.     Family inquiring about how long he would have once comfort was decided. SICU discussed that given we have weaned him off pressors  and he is on low ventilator settings, he could have some time with family after extubation (possibly days). Family asked about what would happen after that, would he stay in the hospital. Discussed he would likely die in the hospital. However, if he stabilized and we felt he could transport home to [place], we could also  work on that discharge plan as a backup option.     We discussed potential risks and rationale of attempting cardiac resuscitation, intubation, and mechanical ventilation. We also discussed probability of survival as well as quality of life implications. Reviewed that prior to surgery, Gamaliel had declined changing this because he wanted to focus on the positive and not what ifs. Daughter also expressed that she thinks he wanted to remain full code so he could get home. They will talk more about this as a family.       Review of Systems:     Besides above, ROS was reviewed and is unremarkable.      Physical Exam:   Temp:  [97.2  F (36.2  C)-97.8  F (36.6  C)] 97.2  F (36.2  C)  Pulse:  [88-97] 88  Resp:  [16-20] 20  BP: (158-167)/() 158/104  SpO2:  [95 %-97 %] 96 %  178 lbs 12.69 oz    GEN: Intubated and appears sedated on my exam      Data Reviewed:     Lab data independently reviewed today. Pertinent findings from my perspective are noted below:  GFR57, creatinine 1.34  INR 10  Plts 302, hgb 9.4   LFTs elevated     Imaging independently reviewed today. Pertinent findings from my perspective are noted below:  CT Abd 6/10/2025 showing likely bowel perforation     Medical Decision Making       MANAGEMENT DISCUSSED with the following over the past 24 hours: SICU, bedside RN, unit SW   NOTE(S)/MEDICAL RECORDS REVIEWED over the past 24 hours: oncology, GI, surgery, medicine, nursing, ICU  Tests REVIEWED in the past 24 hours:  - See lab/imaging results included in the data section of the note  SUPPLEMENTAL HISTORY, in addition to the patient's history, over the past 24 hours obtained from:   - Son and daughter  Medical complexity over the past 24 hours:  - Decision to DE-ESCALATE CARE based on prognosis      Advance Care Planning Discussion 6/10/2025. IRebecca APRN CNP met with The Health Care Agent(s) today at the hospital to discuss Advance Care Planning. Gamaliel Johnson does not have decisional  capacity  and was not present for this discussion due to sedated and intubated.  Those present were informed of the voluntary nature of this discussion and wished to proceed.  The discussion included: see above. This discussion began at 2pm and ended at 2:30PM for a total of 30 minutes.      Chart documentation was completed using Dragon voice-recognition software. Even though reviewed, some grammatical, spelling, and word errors may remain.

## 2025-06-09 NOTE — BRIEF OP NOTE
[facility]    Brief Operative Note    Pre-operative diagnosis: Limb ischemia [I99.8]  Post-operative diagnosis Same as pre-operative diagnosis    Procedure: Right iliofemoral Thrombectomy, popliteal thrombectomy through separate incision, dorsalis pedis and posterior tibial thrombectomy through separate incisions, Right - Leg  Angiogram Right Leg, Right - Leg  Fasciotomy lower extremity Right, Right - Leg    Surgeon: Surgeons and Role:     * Bruno Zamudio MD - Primary     * Mandy Hackett MD - Assisting  Anesthesia: General   Estimated Blood Loss: 250 mL from 6/9/2025 10:54 AM to 6/9/2025  5:50 PM      Drains: None  Specimens:   ID Type Source Tests Collected by Time Destination   1 : right femoral thrombus Tissue Thrombus/Embolus SURGICAL PATHOLOGY EXAM Bruno Zamudio MD 6/9/2025 12:42 PM    2 : Right Popliteal Thrombus Tissue Thrombus/Embolus SURGICAL PATHOLOGY EXAM Bruno Zamudio MD 6/9/2025  2:16 PM      Findings:   Chronic appearing thrombus removed from common iliac artery on proximal pass with return of brisk inflow. Chronic and acute appearing clot removed from SFA distally with some improvement in backbleeding though not significant. Profunda with brisk backbleeding with negative thrombectomy pass.   Acute appearing aneurysmal external iliac artery. Stent placed in KORY to cover what appeared to  be persistent narrowing. On angiogram evidence of popliteal occlusion, popliteal cutdown performed. Clot removed from proximal and distal popliteal with jose thrombectomy. Several passes distally into tibial vessels with no negative pass achieved. Angiogram with peroneal runoff at this point as well as narrowing of popliteal artery at repair site. Cutdowns on DP and PT arteries performed w/ diseased appearing PT. Fogarties only able to be passed approximately 10cm proximally in both vessels before encountering what felt to be chronic  occlusions.    Absent doppler signals in foot at end of procedure.   Capillary refill improved    Complications: None.  Implants:   Implant Name Type Inv. Item Serial No.  Lot No. LRB No. Used Fidel RENDON   [id number]   Right 1 Implanted   GRAFT PATCH VASC XENOSURE BIOLOGIC 0.8X08CM E0.8P8 - [id number] Bone/Tissue Synthetic GRAFT PATCH VASC XENOSURE BIOLOGIC 0.8X08CM E0.8P8  [facility] VASCULAR IN [id number] Right 1 Implanted           Patient to PACU on vasopressin.   Keep heparin drip going  ICU for pressor needs  Q1 doppler/neuro checks       Darrell Hackett MD   Vascular Surgery PGY4  Pager: [phone]     Please page me directly with any questions/concerns during regular weekday hours before 5PM. If there is no response, if it is a weekend, or if it is during evening hours, then please use the [employer] system to page the first-call resident on call for vascular surgery.

## 2025-06-09 NOTE — PROGRESS NOTES
Vascular Surgery Brief Note    Paged regarding concern for new onset ALI this am in patient w/ stage 4 metastatic pancreatic adenocarcinoma.  Patient had acute onset at 4 AM of extreme pain in his right lower extremity.  He has not had pain like this before.  Pain is 10 out of 10 and has not relieved with IV Dilaudid    He was initially admitted for a bowel obstruction and was diagnosed with pancreatic adenocarcinoma this hospital stay within the past 2 weeks.  He has noted to have peritoneal carcinomatosis.  He is also required a palliative stent in order to resolve his bowel obstruction.  Plan was for him to return home with hospice with plan for palliative care.     Prior to his hospital stay patient was active lifting weights lived at home independently.  He was able to walk and walk up a flight of stairs.    On exam this morning he has absent Doppler signals in his right DP and PT as well as popliteal arteries.  I am able to get a strong monophasic Doppler signal in his right femoral artery.  His left femoral pulse is palpable however and he has strong triphasic Doppler signals in his left DP and PT.  His foot is pale however not explicitly cool to touch.  On the right.  He is able to plantar and dorsiflex on the right 5 out of 5 however he is not able to wiggle his toes as significantly on the right as he is on the left.  His sensation is absent on the right foot from the level of the toes all the way to the ankle.  Sensation is intact on the left foot     Overall suspect this patient has acute Hailey 2B limb ischemia (meets this criteria based on sensory loss in greater than just toes) likely secondary to hypercoagulable state in the setting of metastatic pancreatic adenocarcinoma.    He is very clear in his goals that while he understands that intervention for this will not help treat his cancer or cure his overall condition it will help alleviate the pain that he is experiencing in his right leg.   However he would like to pursue all intervention in order to alleviate the pain in his right leg and understands that this would be a major surgery that comes with risks of bleeding infection acute limb ischemia possible MI possible stroke and even death.    I discussed this in detail with patient and his daughter at bedside this morning they were in agreement with proceeding with plan    Emergent OR for right lower extremity thrombectomy angiogram as well as prophylactic 4 compartment fasciotomies  Heparin load    Darrell Hackett MD   Vascular Surgery PGY4  Pager: [phone]     Please page me directly with any questions/concerns during regular weekday hours before 5PM. If there is no response, if it is a weekend, or if it is during evening hours, then please use the [employer] system to page the first-call resident on call for vascular surgery.

## 2025-06-09 NOTE — ANESTHESIA PROCEDURE NOTES
Airway       Patient location during procedure: OR       Procedure Start/Stop Times: 6/9/2025 11:25 AM  Staff -        CRNA: Princess Fernández APRN CRNA       Other Anesthesia Staff: Padmini Oh       Performed By: with CRNAs and SRNA       Procedure performed by resident/fellow/CRNA in presence of a teaching physician.    Consent for Airway        Urgency: elective  Indications and Patient Condition       Indications for airway management: monika-procedural       Induction type:RSI       Mask difficulty assessment: 0 - not attempted    Final Airway Details       Final airway type: endotracheal airway       Successful airway: ETT - single and Oral  Endotracheal Airway Details        ETT size (mm): 7.5       Cuffed: yes       Successful intubation technique: video laryngoscopy       VL Blade Size: MAC 3       Grade View of Cords: 1       Adjucts: stylet       Position: Right       Measured from: gums/teeth       Secured at (cm): 21       Bite block used: None    Post intubation assessment        Placement verified by: capnometry, equal breath sounds and chest rise        Number of attempts at approach: 2 (first attempt with DL (Miller2) unsuccessful my SRNA)       Number of other approaches attempted: 1       Secured with: tape       Ease of procedure: easy       Dentition: Intact and Unchanged    Medication(s) Administered   Medication Administration Time: 6/9/2025 11:25 AM        
Arterial Line Procedure Note    Pre-Procedure   Staff -        Anesthesiologist:  Guerrero Singh MD       Performed By: anesthesiologist       Location: OR       Pre-Anesthestic Checklist: patient identified, IV checked, risks and benefits discussed, informed consent, monitors and equipment checked, pre-op evaluation and at physician/surgeon's request  Timeout:       Correct Patient: Yes        Correct Procedure: Yes        Correct Site: Yes        Correct Position: Yes   Line Placement:   This line was placed Post Induction  Procedure   Procedure: arterial line       Laterality: right       Insertion Site: radial.  Sterile Prep        Standard elements of sterile barrier followed       Skin prep: Chloraprep  Insertion/Injection        Technique: Seldinger Technique and ultrasound guided        Medical Necessity: calcified vessel, atherosclerosis and need to minimize puncture attempts       1. Ultrasound was used to evaluate the access site.       2. Artery evaluated via ultrasound for patency/adequacy.       3. Using real-time ultrasound the needle/catheter was observed entering the artery/vein.       Catheter Type/Size: 20 G, 1.75 in/4.5 cm quick cath (integral wire)  Narrative         Secured by: other       Tegaderm dressing used.       Complications: None apparent,        Arterial waveform: Yes        IBP within 10% of NIBP: Yes      
No

## 2025-06-09 NOTE — CONSULTS
"      [facility] Consult Service Note  Interventional Radiology  06/09/25   8:15 AM    Consult Requested: \"Paracentesis and pleurx for ongoing drainage\"    Recommendations/Plan:    Patient is approved for IR intraperitoneal tunneled drain placement, plan for 6/10/25. If IR is able to accommodate today, we will call for patient as patient will be NPO all day.    Timing of procedure is TBD based on IR staffing/schedule and triage.  Please contact the IR charge RN at [phone] for estimated time of procedure.     Labs WNL for procedure.    Orders entered for procedure, NPO status, and pre procedure IV antibiotics (held).   Medications to be held include: NA, lovenox held  Informed consent will be completed prior to procedure.     Case and imaging discussed with IR attending Dr. Paula Oliva MD   Recommendations were reviewed with Gregoria Mccloud    ================================================================    ADDENDUM: 6/9/25 1243    Team is requesting fluid analysis for patient's paracentesis tomorrow on 6/9/25. Patient was also taken to the OR today by vascular surgery. After further review, IR will offer paracentesis only on 6/10/25 and see if patient re-accumulates ascites prior to pursuing a tunneled intraperitoneal catheter placement. Order for tunneled intraperitoneal catheter has been canceled and changed to a paracentesis order.     Autumn Tomas PA-C      ================================================================    History of Present Illness:  Gamaliel Johnson is a 69 year old English speaking male with past medical history of hepatitis C with compensated cirrhosis, HTN, tobacco use, and past alcohol use who was admitted on 6/4/25 for  management of abdominal pain and vomiting found to have large bowel obstruction, likely carcinomatosis, and new PE. Patient had undergone recent umbilical hernia repair on 5/30/25 at which time was seen to have nodularities concerning for malignancy and from " "which biopsy returned positive for adenocarcinoma.  Concerns are for new stage IV pancreatic adenocarcinoma with presumed mets to omentum, peritoneum with carcinomatosis, and large bowel. Patient is s/p colonoscopy with colonic stent placement, and EUS with FNB of visualized pancreatic tail mass, by GI 6/5. Patient is going hospice. He has not had a paracentesis yet.     Pertinent Imaging Reviewed:   CT ABDP 6/7/25  No results found for this or any previous visit (from the past 24 hours).    Expected date of discharge:  06/09/2025    Vitals:   BP (!) 158/104 (BP Location: Right arm)   Pulse 88   Temp 97.2  F (36.2  C) (Oral)   Resp 20   Ht 1.702 m (5' 7.01\")   Wt 81.1 kg (178 lb 12.7 oz)   SpO2 96%   BMI 28.00 kg/m      Pertinent Labs Reviewed:  CBC:  Lab Results   Component Value Date    WBC 9.4 06/08/2025    WBC 9.8 06/07/2025    WBC 9.9 06/06/2025    WBC 7.9 04/08/2021    WBC 5.1 12/14/2020    WBC 8.3 07/05/2020     Lab Results   Component Value Date    HGB 10.6 06/08/2025    HGB 11.3 06/07/2025    HGB 11.3 06/06/2025    HGB 15.2 04/08/2021    HGB 15.7 12/14/2020    HGB 14.9 07/05/2020     Lab Results   Component Value Date     06/09/2025     06/08/2025     06/08/2025     04/08/2021     12/14/2020     07/05/2020    INR:  Lab Results   Component Value Date    INR 1.36 (H) 06/04/2025    INR 1.11 07/05/2020          COVID Results:  COVID-19 Antibody Results, Testing for Immunity           No data to display              COVID-19 PCR Results           No data to display             Potassium   Date Value Ref Range Status   06/08/2025 3.4 3.4 - 5.3 mmol/L Final   01/31/2022 4.1 3.4 - 5.3 mmol/L Final   04/09/2021 4.1 3.4 - 5.3 mmol/L Final          Autumn Tomas PA-C  Interventional Radiology  Pager: [phone]    "
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
## 2025-06-09 NOTE — PROGRESS NOTES
Gastroenterology Consultation  GI Advanced Endoscopy/Pancreaticobiliary Service    Date of Admission:  6/4/2025  Date of Consult  6/4/2025   Reason for consult:     new adenocarcinoma, concern for panc primary, bowel obstruction may be due to carcinomatosis - consideration for stent      Requesting Physician:  Kenan Brooks DO  PATIENT:  Gamaliel Johnson  MRN:         [id number]          ASSESSMENT AND RECOMMENDATIONS:   Assessment:  Gamaliel Johnson is a 69 year old male who was admitted on 6/4/2025 for N/V, abdominal distension, and lack of BM or passing gas since 6/2/25.  Patient had undergone open umbilical hernia repair on 5/30/25.  There was nodularity, induration and omental caking; no immediate path was available the hernia sample and omental biopsy were sent and today they returned as adenocarcinoma of unknown origin.  CT c/a/p shows new PE and possible pancreatic mass, and bowel obstruction around the sigmoid colon.  Patient was transferred from [place], [place] for surg onc, GI, and oncology evaluation.   Patient is clinically stable and well appearing, although does get a little nauseated if he tries to eat or drink anything.  Overall good functional status.     Patient has PMH of HTN, treated HCV, AUD drinking about 6 pack a day for many years, and marijuana use.  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  Daughter is involved and lives in the [facility].  No FH of pancreatic cancer, uncle with colon cancer.    # Umbilical hernia with poorly differentiated adenocarcinoma of unknown origin  # Large bowel obstruction secondary to mass in the sigmoid colon, s/p colonoscopy with stent placement  - Bx without malignancy, although unclear if adequately sampled  #c/f Partial SBO (imaging 6/7/25)  # Peritoneal carcinomatosis  # Distal pancreatic mass, s/p EUS FNA on 6/6/25 with poorly differentiated carcinoma  # New PE, RLL, segmental, with possible infarct    # Concern for acute R limb  ischemia    Recommendations:  - Vascular surgical workup/treatment for acute limb ischemia.  - Regarding the partial bowel obstruction, likely being driven by recent surgery, peritoneal malignancy, as well as opioids, which he needs.  Can trial clamping NG-tube and if patient's symptoms worsen, then he would be in need of venting G-tube.  However, I would only do this trial once he has recovered from the acute limb ischemia.   - Ongoing GOC discussion  - Anticoagulation per primary    Thank you for involving us in this patient's care. Please do not hesitate to contact the GI service with any questions or concerns.  The patient was discussed and plan agreed upon with Dr. Barber.    Abel Sherman (Manoj Rosado), , MS  Gastroenterology Fellow  [facility]  Text Page or Vocera           History of Present Illness:   Patient stopped having BM or passing gas since midnight  Distension is a bit improved with NGT decompression   No further vomiting.   Severe RLE pain, concern for acute limb ischemia            Review of Systems:     A review of systems was performed and is negative except as noted in the HPI           Physical Exam:   Temp: 99.2  F (37.3  C) Temp src: Axillary BP: 133/86 Pulse: (!) 124   Resp: 22 SpO2: (!) 91 % O2 Device: None (Room air) Oxygen Delivery: 2 LPM        General Appearance: In 10/10 pain in R leg  HEENT:  NGT in place  Respiratory: Breathing comfortably on RA  Cardiovascular: tachycardidc  GI: soft, distended mildly tender surgical site c/d/i, no peritoneal sign  Extremities: No LE edema noted   Neuro: Moving all extremities   Skin: No jaundice rash on exposed areas   Psych: Alert and oriented, appropriate mood and affect, speech coherent / logical    _______________________________________________________________  Data:  Labs and imaging for last 24 hours were independently reviewed and interpreted

## 2025-06-09 NOTE — PROGRESS NOTES
"  Oncology  Progress Note   Date of Service: 06/08/2025    Patient: Gamaliel Johnson  MRN: [id number]  Admission Date: 6/4/2025  Hospital Day # 4  Cancer Diagnosis: Metastatic pancreatic adenocarcinoma  Primary Outpatient Oncologist: N/A  Current Treatment Plan: N/A    Reason for Consult: \"patient with multiple concerning abdominal masses. biopsy from abdominal nodules 5/30 adenocarcinoma. FNA from pancreas and biopsy from colonic mass 6/6 now resulted as well.\"     Assessment:   Gamaliel Johnson is a 69 year old male with medical history of hypertension, alcohol use disorder, HCV s/p treatment, and new diagnosis of metastatic pancreatic adenocarcinoma.    # Stage IV pancreatic adenocarcinoma  # Secondary peritoneal carcinomatosis  New diagnosis.  CT CAP 6/4 successful noted 3.9 distal pancreatic mass, as well as omental/mesenteric nodularity and mild perihepatic ascites.  During prior umbilical hernia repair surgery on 5/30, he was incidentally found to have peritoneal nodularity which was biopsied and revealed adenocarcinoma.  EUS with FNA biopsy of pancreatic mass resulted as adenocarcinoma in 6/5.  CT chest abdomen pelvis did not reveal any other metastatic sites. Importantly, the CK7+/CK20- was found in both pancreas and peritoneal biopsy is consistent with pancreatic primary. We previously went over treatment for metastatic pancreatic cancer which involves palliative chemo and  is dependent on performance status. Median overall survival for chemo regimens in patients with this PS 1-2 ranges from 4-8 months.     He now has worsening bowel obstruction which raises concern about whether Gamaliel will reach treatment at all. Of note, he had a large bowel obstruction with colonic mass on colonoscopy. While this mass had the visible appearance of primary colon cancer per GI, biopsy was benign. It is not clear if this was inadequate sampling. A metastasis from the pancreatic primary would be rare.I do not think an " "additional colon primary would change his situation as we have peritoneal metastasis consistent with pancreatic primary. With peritoneal carcinomatosis, he is not a surgical candidate.     His bowel obstruction appears to continue to be due to large bowel obstruction without an additional small bowel obstruction on CT.  He has a sigmoid colon mass but there may also be other factors contributing to his bowel obstruction-pain meds, recent surgery etc. It is reasonable to see if he will improve from the bowel obstruction standpoint, and if not, pursing supportive care with hospice. We discussed that if bowels remain obstructed and he cannot get nutrition then chemo is unlikely to give any benefit and only side effects.    # Right lower lobe pulmonary emboli  Noted on CT chest with contrast on 6/4 as having multiple right lower lobe pulmonary emboli with probable evolving right lower lobe pulmonary infarct.  Started on heparin by primary team, and planned for switch to DOAC, which is reasonable from our standpoint.    Recommendations/plan:   -monitor for improvement in bowel obstruction. I would not recommend chemotherapy with an active bowel obstruction and am unsure he will get a window to receive palliative chemo.  -we will continue to follow along     Marley Ron MD  , Division of Hematology, Oncology and Transplantation       Subjective:    Gamaliel had NG replaced last night. Having quite a bit of gas and stool. Overall feeling better. Met with GI and surgery and discussed that he is not a surgical candidate and options moving forward. He is wondering about \"just going home to die.\" Son at bedside visibly upset/angry and told me not to speak to him.  Son says he will help him try other alternative like Ivermectin and other drugs.     Physical Exam:    Blood pressure (!) 158/104, pulse 88, temperature 97.6  F (36.4  C), temperature source Oral, resp. rate 16, height 1.702 m (5' 7.01\"), weight " 82.7 kg (182 lb 6.4 oz), SpO2 95%.  General: alert and cooperative, lying in bed, no acute distress  HEENT: sclera anicteric, moist mucous membranes. NGT in place.  CV: RRR, no murmurs  Resp: CTAB, normal respiratory effort on ambient air on anterior auscultation  GI: Distended, nontender to palpation, no rebound or guarding  MSK: warm and well-perfused, normal tone, no edema  Skin: no jaundice, umbilical hernia surgical scar c/d/I, scattered ecchymosis in the abdomen. Distended but non tender.  Neuro: Alert and interactive, moves all extremities equally, no focal deficits    Labs & Studies: I personally reviewed the following studies:  ROUTINE LABS (Last four results):  CMP  Recent Labs   Lab 06/08/25 2028 06/08/25  1831 06/08/25  1146 06/08/25  0518 06/07/25  2154 06/07/25  0539 06/06/25  1608 06/06/25  0916 06/05/25  2157 06/05/25  0624 06/04/25  0643   NA  --   --   --  138  --  136  --  137  --  136 130*   POTASSIUM 3.4  --  3.1* 2.8*  --  3.4   < > 3.2*  --  3.8 4.1   CHLORIDE  --   --   --  106  --  107  --  106  --  102 93*   CO2  --   --   --  19*  --  19*  --  21*  --  22 21*   ANIONGAP  --   --   --  13  --  10  --  10  --  12 16*   GLC  --  89  --  99 106* 134*   < > 132*   < > 199* 161*   BUN  --   --   --  8.8  --  10.5  --  13.1  --  12.5 8.8   CR  --   --   --  0.57*  --  0.62*  --  0.69  --  0.71 0.84   GFRESTIMATED  --   --   --  >90  --  >90  --  >90  --  >90 >90   ELÍAS  --   --   --  7.9*  --  8.0*  --  8.5*  --  9.2 10.2   MAG  --   --   --   --   --   --   --   --   --  2.0  --    PROTTOTAL  --   --   --   --   --  5.8*  --   --   --  6.9 7.8   ALBUMIN  --   --   --   --   --  2.6*  --   --   --  3.6 4.0   BILITOTAL  --   --   --   --   --  0.4  --   --   --  0.5 0.5   ALKPHOS  --   --   --   --   --  66  --   --   --  69 79   AST  --   --   --   --   --  21  --   --   --  20 21   ALT  --   --   --   --   --  10  --   --   --  11 18    < > = values in this interval not displayed.      CBC  Recent Labs   Lab 06/08/25  1146 06/08/25  0518 06/07/25  0539 06/06/25  1821 06/06/25  0916   WBC  --  9.4 9.8 9.9 10.7   RBC  --  3.53* 3.80* 3.75* 3.98*   HGB  --  10.6* 11.3* 11.3* 12.0*   HCT  --  31.6* 34.4* 34.0* 36.5*   MCV 88 90 91 91 92   MCH  --  30.0 29.7 30.1 30.2   MCHC  --  33.5 32.8 33.2 32.9   RDW  --  13.5 13.6 13.5 13.5    306 300 262 277     INR  Recent Labs   Lab 06/04/25  0643   INR 1.36*     IMAGING:  CT CHEST/ABDOMEN/PELVIS W CONTRAST  6/4/25  IMPRESSION:       Multiple right lower lobe pulmonary emboli. Probable evolving right  lower lobe pulmonary infarct.     Ascites. Mesenteric nodularity most compatible with carcinomatosis.     Probable 3.9 cm distal pancreatic mass versus peripancreatic  metastatic disease. Consider MR abdomen WWO plus MRCP for further  assessment.    Colonoscopy 6/4/2025  Impression:    Colonic mass at the level of the sigmoid                          colon/descending colon, biopsies were performed to                          confirm primary malignancy. A 25 mm by 12 cm Han[place]                          stent was placed with proximal end at 25 cm with great                          difficulty under fluroscopy     PATHOLOGY  Case: [id number]   5/30/25  A.  Umbilicus, mass, excisional biopsy:  - Fibroadipose tissue with metastatic adenocarcinoma (see comment).     B.  Omentum, excisional biopsy:  - Omental adipose tissue with metastatic adenocarcinoma (see comment).    Case: [id number]  6/5/25  A. SIGMOID COLON MASS, BIOPSY:  - Colonic mucosa with no significant histologic abnormality, see comment    [id number]  6/5/25  Specimen A  Pancreas, PANCREAS TAIL LESION, Fine Needle Aspirate:                 Interpretation:                  Positive for malignancy  Adenocarcinoma                 Adequacy:                 Satisfactory for evaluation        Specimen B  Other, PERITONEAL IMPLANTS, Fine Needle Aspirate:                 Interpretation:                   Positive for malignancy  Metastatic adenocarcinoma                 Adequacy:                 Satisfactory for evaluation

## 2025-06-09 NOTE — PLAN OF CARE
Goal Outcome Evaluation:      Plan of Care Reviewed With: patient    Overall Patient Progress: no change    Outcome Evaluation: Alert and oriented x 4. Up with standby assist to the bathroom. Complained of bilateral leg pain with relief from Tylenol and pillow support. NGT to low intermittent suction with low output. Had been having diarrhea overnight. Maintained on NPO, LR infusing at 75 ml/hr. Pt woke up around 5 am complaining of abdominal pain described as pressure , IV Dilaudid given with relief. Pt refused oral pain meds at this time. Requested 02 , started on 02 at 2 lpm . Pt able to go back to sleep. Hypertensive, refused to BP recheck overnight, Dr. Salazar informed. Refused BP check this morning. Possible paracentesis today at IR with possible NGT removal.

## 2025-06-09 NOTE — UTILIZATION REVIEW
Admission Status; Secondary Review Determination       Under the authority of the Utilization Management Committee, the utilization review process indicated a secondary review on the above patient. The review outcome is based on review of the medical records, discussions with staff, and applying clinical experience noted on the date of the review.     (x) Inpatient Status Appropriate - This patient's medical care is consistent with medical management for inpatient care and reasonable inpatient medical practice.     RATIONALE FOR DETERMINATION     This review was done due to in house insurance denial.     This note is written in support of inpatient admission coverage for Gamaliel Johnson, who was admitted on June 4, 2025, for the management of a complex medical condition involving bowel obstruction due to colon malignancy with carcinomatosis. The clinical presentation and subsequent care provided clearly necessitate an inpatient level of care.    Clinical Summary:    Mr. Johnson is a 69-year-old male with a significant medical history, including recent stage IV pancreatic adenocarcinoma with metastases to the omentum and peritoneum, causing carcinomatosis. He presented with symptoms indicative of a large bowel obstruction, including abdominal pain, vomiting, and abdominal distention. Imaging confirmed a probable distal pancreatic mass and mesenteric nodularity compatible with carcinomatosis.    Critical findings supporting the need for inpatient care include:    Persistent abdominal distention and partial bowel obstruction requiring continuous NG tube decompression and NPO status.  Placement of a tunneled peritoneal catheter for frequent peritoneal fluid removal due to moderate ascites.  Acute pulmonary embolism (PE) of the right lower lobe, necessitating anticoagulation management with Lovenox, given the complexity of his condition and preparation for potential IR procedures.  Complex palliative care needs,  "including the initiation of hospice care discussions and symptom management for nausea, vomiting, and pain.  These conditions and interventions necessitate ongoing inpatient monitoring and management, as outpatient care would pose significant risks of adverse outcomes, including potential bowel perforation, uncontrolled pain, and thromboembolic events.    Appeal Argument - Legal and Regulatory Basis:    Under the Medicare Advantage plan guidelines, the \"2 midnights rule\" supports the inpatient admission requirement for Mr. Johnson due to the anticipated need for hospital care extending beyond two midnights. This rule is particularly relevant given his multifaceted medical needs, which require continuous medical interventions and monitoring that cannot be adequately provided in an outpatient setting.    According to the Medicare Benefit Policy Manual, the clinical complexity and high risk of complications in patients with metastatic cancer and thromboembolic events necessitate an inpatient level of care. The standard of care and current medical guidelines for patients with similar diagnoses support an average length of stay exceeding two nights, further validating the need for inpatient admission in this case.      At the time of admission with the information available to the attending physician more than 2 nights Hospital complex care was anticipated, based on patient risk of adverse outcome if treated as outpatient and complex care required. Inpatient admission is appropriate based on the Medicare guidelines.     This document was produced using voice recognition software       The information on this document is developed by the utilization review team in order for the business office to ensure compliance. This only denotes the appropriateness of proper admission status and does not reflect the quality of care rendered.   The definitions of Inpatient Status and Observation Status used in making the determination " above are those provided in the CMS Coverage Manual, Chapter 1 and Chapter 6, section 70.4.   Sincerely,     Bruno Fair MD    Utilization Review  Physician Advisor  [facility].

## 2025-06-09 NOTE — PROGRESS NOTES
Transferred to: OR   Reason for transfer: for emergent surgery  Report given to: OR staff who came to pick him up  Was family notified: Daughter in room  Belongings sent with: stay in room till patients return

## 2025-06-09 NOTE — CONSULTS
I examined Mr. Johnson in consultation for acute right leg ischemia.  Patient has advanced metastatic pancreatic adenocarcinoma that was identified during umbilical hernia repair.  Patient has had a colonic stent placed to relieve colonic obstruction and is scheduled to undergo venting G-tube to treat gastric outlet obstruction.  He also has a pulmonary embolism, iliac vein DVT and is on Lovenox.  He has a normal PTT, but his INR is 3.6.  He has been undergoing evaluation for discharge to home with palliative care.  Today he developed acute onset right leg pain.  He has no right femoral pulse and no distal pulses.  Doppler signal in the right groin is monophasic and there is no Doppler signal in the foot.  The foot is pale and he is having 10 out of 10 pain.  He is able to dorsiflex and plantarflex the foot against resistance despite the pain.  At this point, obtaining further imaging will only delay potential revascularization.    I discussed with the patient and his daughter the natural history of acute limb ischemia and I discussed treatment options of pain control, continued therapeutic anticoagulation and ultimate loss of the right leg or attempt at right leg revascularization with fasciotomies.  Given the expected large thrombus burden from an iliofemoral DVT and the likely need for fasciotomies, I do not think attempt at percutaneous mechanical thrombectomy would be as expeditiously successful as open thrombectomy.  I discussed the risks of open thrombectomy and fasciotomies which include bleeding, infection, recurrent thrombosis, nerve injury, muscle loss, loss of leg, myocardial infarction, pneumonia, renal failure and death.  The patient wants to proceed with right leg revascularization for attempt at limb salvage as well as pain control.  I think this is reasonable.  It would be difficult to obtain adequate pain control and still maintain a quality of life without leg revascularization.  The plan is  emergent right iliofemoral arterial thrombectomy, angiogram, fasciotomies, other indicated procedures in attempt to save the leg and treat his pain.  The patient will need to maintain therapeutic anticoagulation.    I spent a total of 60 minutes on this consult, of which 40 minutes was spent counseling and coordinating care.

## 2025-06-09 NOTE — ANESTHESIA CARE TRANSFER NOTE
Patient: Gamaliel Johnson    Procedure: Procedure(s):  Right iliofemoral Thrombectomy, popliteal thrombectomy through separate incision, dorsalis pedis and posterior tibial thrombectomy through separate incisions  Angiogram Right Leg  Fasciotomy lower extremity Right       Diagnosis: Limb ischemia [I99.8]  Diagnosis Additional Information: No value filed.    Anesthesia Type:   General     Note:    Oropharynx: spontaneously breathing  Level of Consciousness: awake  Oxygen Supplementation: face mask  Level of Supplemental Oxygen (L/min / FiO2): 6  Independent Airway: airway patency satisfactory and stable  Dentition: dentition unchanged  Vital Signs Stable: post-procedure vital signs reviewed and stable  Report to RN Given: handoff report given  Patient transferred to: PACU    Handoff Report: Identifed the Patient, Identified the Reponsible Provider, Reviewed the pertinent medical history, Discussed the surgical course, Reviewed Intra-OP anesthesia mangement and issues during anesthesia, Set expectations for post-procedure period and Allowed opportunity for questions and acknowledgement of understanding    Vitals:  Vitals Value Taken Time   /92 06/09/25 18:00   Temp     Pulse 89 06/09/25 18:02   Resp 20 06/09/25 18:02   SpO2 99 % 06/09/25 18:02   Vitals shown include unfiled device data.    Electronically Signed By: MALDONADO Langston CRNA  June 9, 2025  6:03 PM

## 2025-06-10 LAB
ALBUMIN SERPL BCG-MCNC: 1.4 G/DL (ref 3.5–5.2)
ALBUMIN SERPL BCG-MCNC: 2 G/DL (ref 3.5–5.2)
ALBUMIN SERPL BCG-MCNC: 2.1 G/DL (ref 3.5–5.2)
ALBUMIN SERPL BCG-MCNC: 2.6 G/DL (ref 3.5–5.2)
ALBUMIN UR-MCNC: 10 MG/DL
ALBUMIN UR-MCNC: 20 MG/DL
ALLEN'S TEST: ABNORMAL
ALP SERPL-CCNC: 27 U/L (ref 40–150)
ALP SERPL-CCNC: 44 U/L (ref 40–150)
ALP SERPL-CCNC: 45 U/L (ref 40–150)
ALP SERPL-CCNC: 48 U/L (ref 40–150)
ALT SERPL W P-5'-P-CCNC: 21 U/L (ref 0–70)
ALT SERPL W P-5'-P-CCNC: 390 U/L (ref 0–70)
ALT SERPL W P-5'-P-CCNC: 525 U/L (ref 0–70)
ALT SERPL W P-5'-P-CCNC: 67 U/L (ref 0–70)
ANION GAP SERPL CALCULATED.3IONS-SCNC: 11 MMOL/L (ref 7–15)
ANION GAP SERPL CALCULATED.3IONS-SCNC: 11 MMOL/L (ref 7–15)
ANION GAP SERPL CALCULATED.3IONS-SCNC: 14 MMOL/L (ref 7–15)
ANION GAP SERPL CALCULATED.3IONS-SCNC: 17 MMOL/L (ref 7–15)
ANION GAP SERPL CALCULATED.3IONS-SCNC: 23 MMOL/L (ref 7–15)
APPEARANCE UR: ABNORMAL
APPEARANCE UR: ABNORMAL
APTT PPP: 182 SECONDS (ref 22–38)
APTT PPP: 64 SECONDS (ref 22–38)
APTT PPP: 93 SECONDS (ref 22–38)
APTT PPP: >240 SECONDS (ref 22–38)
AST SERPL W P-5'-P-CCNC: 1055 U/L (ref 0–45)
AST SERPL W P-5'-P-CCNC: 153 U/L (ref 0–45)
AST SERPL W P-5'-P-CCNC: 1932 U/L (ref 0–45)
AST SERPL W P-5'-P-CCNC: 50 U/L (ref 0–45)
BACTERIA #/AREA URNS HPF: ABNORMAL /HPF
BASE EXCESS BLDA CALC-SCNC: -12.6 MMOL/L (ref -3–3)
BASE EXCESS BLDA CALC-SCNC: -2.2 MMOL/L (ref -3–3)
BASE EXCESS BLDA CALC-SCNC: -3 MMOL/L (ref -3–3)
BASE EXCESS BLDA CALC-SCNC: -5.1 MMOL/L (ref -3–3)
BASE EXCESS BLDA CALC-SCNC: -9.5 MMOL/L (ref -3–3)
BILIRUB SERPL-MCNC: 1.1 MG/DL
BILIRUB SERPL-MCNC: 1.3 MG/DL
BILIRUB SERPL-MCNC: 1.8 MG/DL
BILIRUB SERPL-MCNC: 2.2 MG/DL
BILIRUB UR QL STRIP: NEGATIVE
BILIRUB UR QL STRIP: NEGATIVE
BLD PROD TYP BPU: NORMAL
BLD PROD TYP BPU: NORMAL
BLOOD COMPONENT TYPE: NORMAL
BLOOD COMPONENT TYPE: NORMAL
BUN SERPL-MCNC: 19.4 MG/DL (ref 8–23)
BUN SERPL-MCNC: 20 MG/DL (ref 8–23)
BUN SERPL-MCNC: 20.7 MG/DL (ref 8–23)
BUN SERPL-MCNC: 24.5 MG/DL (ref 8–23)
BUN SERPL-MCNC: 31.9 MG/DL (ref 8–23)
CA-I BLD-MCNC: 4 MG/DL (ref 4.4–5.2)
CA-I BLD-MCNC: 4.4 MG/DL (ref 4.4–5.2)
CALCIUM SERPL-MCNC: 5.9 MG/DL (ref 8.8–10.4)
CALCIUM SERPL-MCNC: 6.4 MG/DL (ref 8.8–10.4)
CALCIUM SERPL-MCNC: 8 MG/DL (ref 8.8–10.4)
CALCIUM SERPL-MCNC: 8 MG/DL (ref 8.8–10.4)
CALCIUM SERPL-MCNC: 8.4 MG/DL (ref 8.8–10.4)
CHLORIDE SERPL-SCNC: 104 MMOL/L (ref 98–107)
CHLORIDE SERPL-SCNC: 105 MMOL/L (ref 98–107)
CHLORIDE SERPL-SCNC: 106 MMOL/L (ref 98–107)
CHLORIDE SERPL-SCNC: 115 MMOL/L (ref 98–107)
CHLORIDE SERPL-SCNC: 117 MMOL/L (ref 98–107)
CK SERPL-CCNC: 282 U/L (ref 39–308)
CODING SYSTEM: NORMAL
CODING SYSTEM: NORMAL
COLOR UR AUTO: YELLOW
COLOR UR AUTO: YELLOW
CREAT SERPL-MCNC: 1.34 MG/DL (ref 0.67–1.17)
CREAT SERPL-MCNC: 1.47 MG/DL (ref 0.67–1.17)
CREAT SERPL-MCNC: 1.67 MG/DL (ref 0.67–1.17)
CREAT SERPL-MCNC: 1.84 MG/DL (ref 0.67–1.17)
CREAT SERPL-MCNC: 2.27 MG/DL (ref 0.67–1.17)
CROSSMATCH: NORMAL
CROSSMATCH: NORMAL
EGFRCR SERPLBLD CKD-EPI 2021: 30 ML/MIN/1.73M2
EGFRCR SERPLBLD CKD-EPI 2021: 39 ML/MIN/1.73M2
EGFRCR SERPLBLD CKD-EPI 2021: 44 ML/MIN/1.73M2
EGFRCR SERPLBLD CKD-EPI 2021: 51 ML/MIN/1.73M2
EGFRCR SERPLBLD CKD-EPI 2021: 57 ML/MIN/1.73M2
ERYTHROCYTE [DISTWIDTH] IN BLOOD BY AUTOMATED COUNT: 14.6 % (ref 10–15)
ERYTHROCYTE [DISTWIDTH] IN BLOOD BY AUTOMATED COUNT: 15.6 % (ref 10–15)
FIBRINOGEN PPP-MCNC: 325 MG/DL (ref 170–510)
FIBRINOGEN PPP-MCNC: 435 MG/DL (ref 170–510)
FIBRINOGEN PPP-MCNC: 561 MG/DL (ref 170–510)
GLUCOSE BLDC GLUCOMTR-MCNC: 104 MG/DL (ref 70–99)
GLUCOSE BLDC GLUCOMTR-MCNC: 106 MG/DL (ref 70–99)
GLUCOSE BLDC GLUCOMTR-MCNC: 123 MG/DL (ref 70–99)
GLUCOSE BLDC GLUCOMTR-MCNC: 131 MG/DL (ref 70–99)
GLUCOSE BLDC GLUCOMTR-MCNC: 139 MG/DL (ref 70–99)
GLUCOSE BLDC GLUCOMTR-MCNC: 160 MG/DL (ref 70–99)
GLUCOSE SERPL-MCNC: 110 MG/DL (ref 70–99)
GLUCOSE SERPL-MCNC: 129 MG/DL (ref 70–99)
GLUCOSE SERPL-MCNC: 140 MG/DL (ref 70–99)
GLUCOSE SERPL-MCNC: 145 MG/DL (ref 70–99)
GLUCOSE SERPL-MCNC: 154 MG/DL (ref 70–99)
GLUCOSE UR STRIP-MCNC: NEGATIVE MG/DL
GLUCOSE UR STRIP-MCNC: NEGATIVE MG/DL
HCO3 BLD-SCNC: 16 MMOL/L (ref 21–28)
HCO3 BLD-SCNC: 17 MMOL/L (ref 21–28)
HCO3 BLD-SCNC: 18 MMOL/L (ref 21–28)
HCO3 BLD-SCNC: 21 MMOL/L (ref 21–28)
HCO3 BLD-SCNC: 22 MMOL/L (ref 21–28)
HCO3 SERPL-SCNC: 14 MMOL/L (ref 22–29)
HCO3 SERPL-SCNC: 15 MMOL/L (ref 22–29)
HCO3 SERPL-SCNC: 17 MMOL/L (ref 22–29)
HCO3 SERPL-SCNC: 17 MMOL/L (ref 22–29)
HCO3 SERPL-SCNC: 19 MMOL/L (ref 22–29)
HCT VFR BLD AUTO: 29.7 % (ref 40–53)
HCT VFR BLD AUTO: 32.5 % (ref 40–53)
HGB BLD-MCNC: 10.8 G/DL (ref 13.3–17.7)
HGB BLD-MCNC: 10.9 G/DL (ref 13.3–17.7)
HGB BLD-MCNC: 10.9 G/DL (ref 13.3–17.7)
HGB BLD-MCNC: 9.4 G/DL (ref 13.3–17.7)
HGB UR QL STRIP: ABNORMAL
HGB UR QL STRIP: ABNORMAL
HYALINE CASTS: 6 /LPF
INR PPP: >10 (ref 0.85–1.15)
ISSUE DATE AND TIME: NORMAL
ISSUE DATE AND TIME: NORMAL
KETONES UR STRIP-MCNC: ABNORMAL MG/DL
KETONES UR STRIP-MCNC: ABNORMAL MG/DL
LACTATE SERPL-SCNC: 10 MMOL/L (ref 0.7–2)
LACTATE SERPL-SCNC: 11.5 MMOL/L (ref 0.7–2)
LACTATE SERPL-SCNC: 2.7 MMOL/L (ref 0.7–2)
LACTATE SERPL-SCNC: 3.3 MMOL/L (ref 0.7–2)
LACTATE SERPL-SCNC: 4.1 MMOL/L (ref 0.7–2)
LEUKOCYTE ESTERASE UR QL STRIP: NEGATIVE
LEUKOCYTE ESTERASE UR QL STRIP: NEGATIVE
MAGNESIUM SERPL-MCNC: 1.6 MG/DL (ref 1.7–2.3)
MAGNESIUM SERPL-MCNC: 1.7 MG/DL (ref 1.7–2.3)
MAGNESIUM SERPL-MCNC: 2.2 MG/DL (ref 1.7–2.3)
MAGNESIUM SERPL-MCNC: 2.9 MG/DL (ref 1.7–2.3)
MCH RBC QN AUTO: 29.2 PG (ref 26.5–33)
MCH RBC QN AUTO: 29.3 PG (ref 26.5–33)
MCHC RBC AUTO-ENTMCNC: 31.6 G/DL (ref 31.5–36.5)
MCHC RBC AUTO-ENTMCNC: 33.5 G/DL (ref 31.5–36.5)
MCV RBC AUTO: 87 FL (ref 78–100)
MCV RBC AUTO: 87 FL (ref 78–100)
MCV RBC AUTO: 88 FL (ref 78–100)
MCV RBC AUTO: 92 FL (ref 78–100)
MRSA DNA SPEC QL NAA+PROBE: NEGATIVE
MUCOUS THREADS #/AREA URNS LPF: PRESENT /LPF
MUCOUS THREADS #/AREA URNS LPF: PRESENT /LPF
NITRATE UR QL: NEGATIVE
NITRATE UR QL: NEGATIVE
O2/TOTAL GAS SETTING VFR VENT: 100 %
O2/TOTAL GAS SETTING VFR VENT: 50 %
O2/TOTAL GAS SETTING VFR VENT: 50 %
O2/TOTAL GAS SETTING VFR VENT: 70 %
O2/TOTAL GAS SETTING VFR VENT: 70 %
OXYHGB MFR BLDA: 91 % (ref 92–100)
OXYHGB MFR BLDA: 93 % (ref 92–100)
OXYHGB MFR BLDA: 95 % (ref 92–100)
OXYHGB MFR BLDA: 96 % (ref 92–100)
OXYHGB MFR BLDA: 98 % (ref 92–100)
PCO2 BLD: 24 MM HG (ref 35–45)
PCO2 BLD: 33 MM HG (ref 35–45)
PCO2 BLD: 34 MM HG (ref 35–45)
PCO2 BLD: 34 MM HG (ref 35–45)
PCO2 BLD: 54 MM HG (ref 35–45)
PEEP: 5 CM H2O
PH BLD: 7.1 [PH] (ref 7.35–7.45)
PH BLD: 7.29 [PH] (ref 7.35–7.45)
PH BLD: 7.41 [PH] (ref 7.35–7.45)
PH BLD: 7.42 [PH] (ref 7.35–7.45)
PH BLD: 7.47 [PH] (ref 7.35–7.45)
PH UR STRIP: 5 [PH] (ref 5–7)
PH UR STRIP: 5 [PH] (ref 5–7)
PHOSPHATE SERPL-MCNC: 4.4 MG/DL (ref 2.5–4.5)
PHOSPHATE SERPL-MCNC: 4.6 MG/DL (ref 2.5–4.5)
PHOSPHATE SERPL-MCNC: 5.8 MG/DL (ref 2.5–4.5)
PHOSPHATE SERPL-MCNC: 6.1 MG/DL (ref 2.5–4.5)
PLATELET # BLD AUTO: 302 10E3/UL (ref 150–450)
PLATELET # BLD AUTO: 319 10E3/UL (ref 150–450)
PO2 BLD: 115 MM HG (ref 80–105)
PO2 BLD: 181 MM HG (ref 80–105)
PO2 BLD: 72 MM HG (ref 80–105)
PO2 BLD: 74 MM HG (ref 80–105)
PO2 BLD: 81 MM HG (ref 80–105)
POTASSIUM SERPL-SCNC: 3 MMOL/L (ref 3.4–5.3)
POTASSIUM SERPL-SCNC: 3.8 MMOL/L (ref 3.4–5.3)
POTASSIUM SERPL-SCNC: 4.5 MMOL/L (ref 3.4–5.3)
POTASSIUM SERPL-SCNC: 4.8 MMOL/L (ref 3.4–5.3)
POTASSIUM SERPL-SCNC: 5.3 MMOL/L (ref 3.4–5.3)
PROT SERPL-MCNC: 2.7 G/DL (ref 6.4–8.3)
PROT SERPL-MCNC: 4.1 G/DL (ref 6.4–8.3)
PROT SERPL-MCNC: 4.1 G/DL (ref 6.4–8.3)
PROT SERPL-MCNC: 4.7 G/DL (ref 6.4–8.3)
PROTHROMBIN TIME: >76.1 SECONDS (ref 11.8–14.8)
RADIOLOGIST FLAGS: ABNORMAL
RBC # BLD AUTO: 3.22 10E6/UL (ref 4.4–5.9)
RBC # BLD AUTO: 3.72 10E6/UL (ref 4.4–5.9)
RBC URINE: 10 /HPF
RBC URINE: 70 /HPF
SA TARGET DNA: NEGATIVE
SAO2 % BLDA: 93.2 % (ref 95–96)
SAO2 % BLDA: 94.9 % (ref 95–96)
SAO2 % BLDA: 96.5 % (ref 95–96)
SAO2 % BLDA: 98.9 % (ref 95–96)
SAO2 % BLDA: 99.8 % (ref 95–96)
SODIUM SERPL-SCNC: 138 MMOL/L (ref 135–145)
SODIUM SERPL-SCNC: 139 MMOL/L (ref 135–145)
SODIUM SERPL-SCNC: 142 MMOL/L (ref 135–145)
SODIUM SERPL-SCNC: 143 MMOL/L (ref 135–145)
SODIUM SERPL-SCNC: 143 MMOL/L (ref 135–145)
SP GR UR STRIP: 1.01 (ref 1–1.03)
SP GR UR STRIP: 1.03 (ref 1–1.03)
TROPONIN T SERPL HS-MCNC: 106 NG/L
TROPONIN T SERPL HS-MCNC: 111 NG/L
TROPONIN T SERPL HS-MCNC: 97 NG/L
TROPONIN T SERPL HS-MCNC: 97 NG/L
UFH PPP CHRO-ACNC: >1.1 IU/ML (ref ?–1.1)
UNIT ABO/RH: NORMAL
UNIT ABO/RH: NORMAL
UNIT NUMBER: NORMAL
UNIT NUMBER: NORMAL
UNIT STATUS: NORMAL
UNIT STATUS: NORMAL
UNIT TYPE ISBT: 5100
UNIT TYPE ISBT: 5100
UROBILINOGEN UR STRIP-MCNC: NORMAL MG/DL
UROBILINOGEN UR STRIP-MCNC: NORMAL MG/DL
WBC # BLD AUTO: 11 10E3/UL (ref 4–11)
WBC # BLD AUTO: 9.8 10E3/UL (ref 4–11)
WBC URINE: 8 /HPF
WBC URINE: <1 /HPF

## 2025-06-10 PROCEDURE — P9016 RBC LEUKOCYTES REDUCED: HCPCS | Performed by: NURSE PRACTITIONER

## 2025-06-10 PROCEDURE — 250N000011 HC RX IP 250 OP 636

## 2025-06-10 PROCEDURE — 200N000002 HC R&B ICU UMMC

## 2025-06-10 PROCEDURE — 36556 INSERT NON-TUNNEL CV CATH: CPT | Performed by: SURGERY

## 2025-06-10 PROCEDURE — 84100 ASSAY OF PHOSPHORUS: CPT

## 2025-06-10 PROCEDURE — 82330 ASSAY OF CALCIUM: CPT

## 2025-06-10 PROCEDURE — 82310 ASSAY OF CALCIUM: CPT | Performed by: STUDENT IN AN ORGANIZED HEALTH CARE EDUCATION/TRAINING PROGRAM

## 2025-06-10 PROCEDURE — 93005 ELECTROCARDIOGRAM TRACING: CPT

## 2025-06-10 PROCEDURE — 99292 CRITICAL CARE ADDL 30 MIN: CPT | Mod: 25 | Performed by: SURGERY

## 2025-06-10 PROCEDURE — 84155 ASSAY OF PROTEIN SERUM: CPT

## 2025-06-10 PROCEDURE — 85610 PROTHROMBIN TIME: CPT | Performed by: NURSE PRACTITIONER

## 2025-06-10 PROCEDURE — 87641 MR-STAPH DNA AMP PROBE: CPT

## 2025-06-10 PROCEDURE — 250N000009 HC RX 250

## 2025-06-10 PROCEDURE — 85730 THROMBOPLASTIN TIME PARTIAL: CPT

## 2025-06-10 PROCEDURE — 85384 FIBRINOGEN ACTIVITY: CPT

## 2025-06-10 PROCEDURE — 5A1945Z RESPIRATORY VENTILATION, 24-96 CONSECUTIVE HOURS: ICD-10-PCS | Performed by: EMERGENCY MEDICINE

## 2025-06-10 PROCEDURE — 999N000248 HC STATISTIC IV INSERT WITH US BY RN

## 2025-06-10 PROCEDURE — 81003 URINALYSIS AUTO W/O SCOPE: CPT | Performed by: INTERNAL MEDICINE

## 2025-06-10 PROCEDURE — 85384 FIBRINOGEN ACTIVITY: CPT | Performed by: STUDENT IN AN ORGANIZED HEALTH CARE EDUCATION/TRAINING PROGRAM

## 2025-06-10 PROCEDURE — 93010 ELECTROCARDIOGRAM REPORT: CPT | Performed by: INTERNAL MEDICINE

## 2025-06-10 PROCEDURE — 250N000012 HC RX MED GY IP 250 OP 636 PS 637

## 2025-06-10 PROCEDURE — 999N000285 HC STATISTIC VASC ACCESS LAB DRAW WITH PIV START

## 2025-06-10 PROCEDURE — 85396 CLOTTING ASSAY WHOLE BLOOD: CPT

## 2025-06-10 PROCEDURE — 250N000011 HC RX IP 250 OP 636: Performed by: SURGERY

## 2025-06-10 PROCEDURE — 85730 THROMBOPLASTIN TIME PARTIAL: CPT | Performed by: INTERNAL MEDICINE

## 2025-06-10 PROCEDURE — 82550 ASSAY OF CK (CPK): CPT | Performed by: INTERNAL MEDICINE

## 2025-06-10 PROCEDURE — 85520 HEPARIN ASSAY: CPT | Performed by: NURSE PRACTITIONER

## 2025-06-10 PROCEDURE — 999N000157 HC STATISTIC RCP TIME EA 10 MIN

## 2025-06-10 PROCEDURE — 85014 HEMATOCRIT: CPT | Performed by: PHYSICIAN ASSISTANT

## 2025-06-10 PROCEDURE — 85018 HEMOGLOBIN: CPT

## 2025-06-10 PROCEDURE — 82310 ASSAY OF CALCIUM: CPT

## 2025-06-10 PROCEDURE — 83735 ASSAY OF MAGNESIUM: CPT

## 2025-06-10 PROCEDURE — 99291 CRITICAL CARE FIRST HOUR: CPT | Mod: 25 | Performed by: SURGERY

## 2025-06-10 PROCEDURE — 82805 BLOOD GASES W/O2 SATURATION: CPT | Performed by: STUDENT IN AN ORGANIZED HEALTH CARE EDUCATION/TRAINING PROGRAM

## 2025-06-10 PROCEDURE — 84484 ASSAY OF TROPONIN QUANT: CPT

## 2025-06-10 PROCEDURE — 99207 PR NO BILLABLE SERVICE THIS VISIT: CPT | Performed by: PHYSICIAN ASSISTANT

## 2025-06-10 PROCEDURE — 36416 COLLJ CAPILLARY BLOOD SPEC: CPT

## 2025-06-10 PROCEDURE — 99497 ADVNCD CARE PLAN 30 MIN: CPT | Mod: 25

## 2025-06-10 PROCEDURE — 82805 BLOOD GASES W/O2 SATURATION: CPT

## 2025-06-10 PROCEDURE — 99222 1ST HOSP IP/OBS MODERATE 55: CPT | Mod: 24 | Performed by: NURSE PRACTITIONER

## 2025-06-10 PROCEDURE — 83605 ASSAY OF LACTIC ACID: CPT

## 2025-06-10 PROCEDURE — 999N000034 HC STATISTIC CODE BLUE ACCESS REQUIRED

## 2025-06-10 PROCEDURE — 258N000003 HC RX IP 258 OP 636

## 2025-06-10 PROCEDURE — 36415 COLL VENOUS BLD VENIPUNCTURE: CPT

## 2025-06-10 PROCEDURE — 83605 ASSAY OF LACTIC ACID: CPT | Performed by: NURSE PRACTITIONER

## 2025-06-10 PROCEDURE — 06HY33Z INSERTION OF INFUSION DEVICE INTO LOWER VEIN, PERCUTANEOUS APPROACH: ICD-10-PCS | Performed by: SURGERY

## 2025-06-10 PROCEDURE — 85014 HEMATOCRIT: CPT | Performed by: NURSE PRACTITIONER

## 2025-06-10 PROCEDURE — 250N000009 HC RX 250: Performed by: PHYSICIAN ASSISTANT

## 2025-06-10 PROCEDURE — 84484 ASSAY OF TROPONIN QUANT: CPT | Performed by: STUDENT IN AN ORGANIZED HEALTH CARE EDUCATION/TRAINING PROGRAM

## 2025-06-10 PROCEDURE — 82247 BILIRUBIN TOTAL: CPT

## 2025-06-10 PROCEDURE — 36620 INSERTION CATHETER ARTERY: CPT | Performed by: SURGERY

## 2025-06-10 PROCEDURE — 84155 ASSAY OF PROTEIN SERUM: CPT | Performed by: PHYSICIAN ASSISTANT

## 2025-06-10 PROCEDURE — 250N000011 HC RX IP 250 OP 636: Performed by: NURSE PRACTITIONER

## 2025-06-10 PROCEDURE — 87070 CULTURE OTHR SPECIMN AEROBIC: CPT

## 2025-06-10 PROCEDURE — 87640 STAPH A DNA AMP PROBE: CPT

## 2025-06-10 PROCEDURE — 87040 BLOOD CULTURE FOR BACTERIA: CPT

## 2025-06-10 PROCEDURE — 999N000128 HC STATISTIC PERIPHERAL IV START W/O US GUIDANCE

## 2025-06-10 PROCEDURE — 94002 VENT MGMT INPAT INIT DAY: CPT

## 2025-06-10 PROCEDURE — 81003 URINALYSIS AUTO W/O SCOPE: CPT

## 2025-06-10 PROCEDURE — 85610 PROTHROMBIN TIME: CPT

## 2025-06-10 PROCEDURE — 250N000009 HC RX 250: Performed by: NURSE PRACTITIONER

## 2025-06-10 PROCEDURE — 84155 ASSAY OF PROTEIN SERUM: CPT | Performed by: STUDENT IN AN ORGANIZED HEALTH CARE EDUCATION/TRAINING PROGRAM

## 2025-06-10 PROCEDURE — 83605 ASSAY OF LACTIC ACID: CPT | Performed by: STUDENT IN AN ORGANIZED HEALTH CARE EDUCATION/TRAINING PROGRAM

## 2025-06-10 PROCEDURE — 999N000127 HC STATISTIC PERIPHERAL IV START W US GUIDANCE

## 2025-06-10 PROCEDURE — 82330 ASSAY OF CALCIUM: CPT | Performed by: STUDENT IN AN ORGANIZED HEALTH CARE EDUCATION/TRAINING PROGRAM

## 2025-06-10 PROCEDURE — 250N000013 HC RX MED GY IP 250 OP 250 PS 637

## 2025-06-10 PROCEDURE — 82565 ASSAY OF CREATININE: CPT | Performed by: PHYSICIAN ASSISTANT

## 2025-06-10 PROCEDURE — 99233 SBSQ HOSP IP/OBS HIGH 50: CPT | Mod: 25

## 2025-06-10 PROCEDURE — 258N000003 HC RX IP 258 OP 636: Performed by: INTERNAL MEDICINE

## 2025-06-10 PROCEDURE — 999N000185 HC STATISTIC TRANSPORT TIME EA 15 MIN

## 2025-06-10 RX ORDER — DEXTROSE MONOHYDRATE 25 G/50ML
25-50 INJECTION, SOLUTION INTRAVENOUS
Status: DISCONTINUED | OUTPATIENT
Start: 2025-06-10 | End: 2025-06-11

## 2025-06-10 RX ORDER — CEFAZOLIN SODIUM 1 G/50ML
1750 SOLUTION INTRAVENOUS ONCE
Status: COMPLETED | OUTPATIENT
Start: 2025-06-10 | End: 2025-06-10

## 2025-06-10 RX ORDER — PROPOFOL 10 MG/ML
5-75 INJECTION, EMULSION INTRAVENOUS CONTINUOUS
Status: DISCONTINUED | OUTPATIENT
Start: 2025-06-10 | End: 2025-06-11

## 2025-06-10 RX ORDER — CALCIUM CHLORIDE 100 MG/ML
1 INJECTION INTRAVENOUS; INTRAVENTRICULAR ONCE
Status: DISCONTINUED | OUTPATIENT
Start: 2025-06-10 | End: 2025-06-10

## 2025-06-10 RX ORDER — CALCIUM CHLORIDE 100 MG/ML
1 INJECTION INTRAVENOUS; INTRAVENTRICULAR ONCE
Status: COMPLETED | OUTPATIENT
Start: 2025-06-10 | End: 2025-06-10

## 2025-06-10 RX ORDER — POTASSIUM CHLORIDE 29.8 MG/ML
20 INJECTION INTRAVENOUS
Status: COMPLETED | OUTPATIENT
Start: 2025-06-10 | End: 2025-06-10

## 2025-06-10 RX ORDER — LIDOCAINE HYDROCHLORIDE 20 MG/ML
JELLY TOPICAL ONCE
Status: COMPLETED | OUTPATIENT
Start: 2025-06-10 | End: 2025-06-10

## 2025-06-10 RX ORDER — NOREPINEPHRINE BITARTRATE 0.06 MG/ML
.01-.6 INJECTION, SOLUTION INTRAVENOUS CONTINUOUS
Status: DISCONTINUED | OUTPATIENT
Start: 2025-06-10 | End: 2025-06-11

## 2025-06-10 RX ORDER — INDOMETHACIN 25 MG/1
50 CAPSULE ORAL ONCE
Status: COMPLETED | OUTPATIENT
Start: 2025-06-10 | End: 2025-06-10

## 2025-06-10 RX ORDER — IOPAMIDOL 755 MG/ML
116 INJECTION, SOLUTION INTRAVASCULAR ONCE
Status: COMPLETED | OUTPATIENT
Start: 2025-06-10 | End: 2025-06-10

## 2025-06-10 RX ORDER — CHLORHEXIDINE GLUCONATE ORAL RINSE 1.2 MG/ML
15 SOLUTION DENTAL EVERY 12 HOURS
Status: DISCONTINUED | OUTPATIENT
Start: 2025-06-10 | End: 2025-06-11 | Stop reason: HOSPADM

## 2025-06-10 RX ORDER — MIDAZOLAM HCL IN 0.9 % NACL/PF 1 MG/ML
1-8 PLASTIC BAG, INJECTION (ML) INTRAVENOUS CONTINUOUS
Status: DISCONTINUED | OUTPATIENT
Start: 2025-06-10 | End: 2025-06-10

## 2025-06-10 RX ORDER — NICOTINE POLACRILEX 4 MG
15-30 LOZENGE BUCCAL
Status: DISCONTINUED | OUTPATIENT
Start: 2025-06-10 | End: 2025-06-11

## 2025-06-10 RX ORDER — NOREPINEPHRINE BITARTRATE 0.06 MG/ML
INJECTION, SOLUTION INTRAVENOUS
Status: COMPLETED
Start: 2025-06-10 | End: 2025-06-10

## 2025-06-10 RX ORDER — NOREPINEPHRINE BITARTRATE 0.06 MG/ML
INJECTION, SOLUTION INTRAVENOUS
Status: DISPENSED
Start: 2025-06-10 | End: 2025-06-10

## 2025-06-10 RX ORDER — PIPERACILLIN SODIUM, TAZOBACTAM SODIUM 3; .375 G/15ML; G/15ML
3.38 INJECTION, POWDER, LYOPHILIZED, FOR SOLUTION INTRAVENOUS EVERY 6 HOURS
Status: DISCONTINUED | OUTPATIENT
Start: 2025-06-10 | End: 2025-06-11

## 2025-06-10 RX ORDER — MAGNESIUM SULFATE HEPTAHYDRATE 40 MG/ML
2 INJECTION, SOLUTION INTRAVENOUS ONCE
Status: COMPLETED | OUTPATIENT
Start: 2025-06-10 | End: 2025-06-10

## 2025-06-10 RX ORDER — INDOMETHACIN 25 MG/1
CAPSULE ORAL
Status: COMPLETED
Start: 2025-06-10 | End: 2025-06-10

## 2025-06-10 RX ORDER — ROPIVACAINE IN 0.9% SOD CHL/PF 0.1 %
.01-.2 PLASTIC BAG, INJECTION (ML) EPIDURAL CONTINUOUS
Status: DISCONTINUED | OUTPATIENT
Start: 2025-06-10 | End: 2025-06-10

## 2025-06-10 RX ADMIN — POTASSIUM CHLORIDE 20 MEQ: 29.8 INJECTION, SOLUTION INTRAVENOUS at 16:16

## 2025-06-10 RX ADMIN — SODIUM BICARBONATE 50 MEQ: 84 INJECTION INTRAVENOUS at 08:22

## 2025-06-10 RX ADMIN — CEFAZOLIN SODIUM 2 G: 2 SOLUTION INTRAVENOUS at 00:35

## 2025-06-10 RX ADMIN — VASOPRESSIN 2.4 UNITS/HR: 20 INJECTION, SOLUTION INTRAMUSCULAR; SUBCUTANEOUS at 19:32

## 2025-06-10 RX ADMIN — SODIUM CHLORIDE, SODIUM LACTATE, POTASSIUM CHLORIDE, AND CALCIUM CHLORIDE: .6; .31; .03; .02 INJECTION, SOLUTION INTRAVENOUS at 10:02

## 2025-06-10 RX ADMIN — CALCIUM CHLORIDE 1 G: 100 INJECTION INTRAVENOUS; INTRAVENTRICULAR at 08:30

## 2025-06-10 RX ADMIN — CHLORHEXIDINE GLUCONATE 15 ML: 1.2 SOLUTION ORAL at 20:58

## 2025-06-10 RX ADMIN — PROPOFOL 30 MCG/KG/MIN: 10 INJECTION, EMULSION INTRAVENOUS at 15:34

## 2025-06-10 RX ADMIN — PIPERACILLIN AND TAZOBACTAM 3.38 G: 3; .375 INJECTION, POWDER, LYOPHILIZED, FOR SOLUTION INTRAVENOUS at 20:57

## 2025-06-10 RX ADMIN — SODIUM CHLORIDE, SODIUM LACTATE, POTASSIUM CHLORIDE, AND CALCIUM CHLORIDE 1000 ML: .6; .31; .03; .02 INJECTION, SOLUTION INTRAVENOUS at 08:57

## 2025-06-10 RX ADMIN — LIDOCAINE HYDROCHLORIDE: 20 JELLY TOPICAL at 09:39

## 2025-06-10 RX ADMIN — PIPERACILLIN AND TAZOBACTAM 3.38 G: 3; .375 INJECTION, POWDER, LYOPHILIZED, FOR SOLUTION INTRAVENOUS at 14:44

## 2025-06-10 RX ADMIN — CEFAZOLIN SODIUM 2 G: 2 SOLUTION INTRAVENOUS at 09:24

## 2025-06-10 RX ADMIN — HYDROMORPHONE HYDROCHLORIDE 0.4 MG: 0.2 INJECTION, SOLUTION INTRAMUSCULAR; INTRAVENOUS; SUBCUTANEOUS at 04:34

## 2025-06-10 RX ADMIN — Medication 25 MCG/HR: at 08:44

## 2025-06-10 RX ADMIN — VANCOMYCIN HYDROCHLORIDE 1750 MG: 10 INJECTION, POWDER, LYOPHILIZED, FOR SOLUTION INTRAVENOUS at 12:06

## 2025-06-10 RX ADMIN — VASOPRESSIN 2.4 UNITS/HR: 20 INJECTION, SOLUTION INTRAMUSCULAR; SUBCUTANEOUS at 08:07

## 2025-06-10 RX ADMIN — IOPAMIDOL 116 ML: 755 INJECTION, SOLUTION INTRAVENOUS at 10:37

## 2025-06-10 RX ADMIN — POTASSIUM CHLORIDE 20 MEQ: 29.8 INJECTION, SOLUTION INTRAVENOUS at 14:46

## 2025-06-10 RX ADMIN — NOREPINEPHRINE BITARTRATE 0.09 MCG/KG/MIN: 0.06 INJECTION, SOLUTION INTRAVENOUS at 09:39

## 2025-06-10 RX ADMIN — INSULIN ASPART 1 UNITS: 100 INJECTION, SOLUTION INTRAVENOUS; SUBCUTANEOUS at 20:48

## 2025-06-10 RX ADMIN — NOREPINEPHRINE BITARTRATE 0.1 MCG/KG/MIN: 0.02 INJECTION, SOLUTION INTRAVENOUS at 07:08

## 2025-06-10 RX ADMIN — INDOMETHACIN 50 MEQ: 25 CAPSULE ORAL at 08:22

## 2025-06-10 RX ADMIN — PROPOFOL 10 MCG/KG/MIN: 10 INJECTION, EMULSION INTRAVENOUS at 08:38

## 2025-06-10 RX ADMIN — SODIUM CHLORIDE, SODIUM LACTATE, POTASSIUM CHLORIDE, AND CALCIUM CHLORIDE: .6; .31; .03; .02 INJECTION, SOLUTION INTRAVENOUS at 18:08

## 2025-06-10 RX ADMIN — NOREPINEPHRINE BITARTRATE 0.03 MCG/KG/MIN: 0.06 INJECTION, SOLUTION INTRAVENOUS at 08:04

## 2025-06-10 RX ADMIN — PROPOFOL 30 MCG/KG/MIN: 10 INJECTION, EMULSION INTRAVENOUS at 22:22

## 2025-06-10 RX ADMIN — POTASSIUM CHLORIDE 20 MEQ: 29.8 INJECTION, SOLUTION INTRAVENOUS at 17:36

## 2025-06-10 RX ADMIN — CHLORHEXIDINE GLUCONATE 15 ML: 1.2 SOLUTION ORAL at 12:06

## 2025-06-10 RX ADMIN — MAGNESIUM SULFATE HEPTAHYDRATE 2 G: 2 INJECTION, SOLUTION INTRAVENOUS at 16:46

## 2025-06-10 ASSESSMENT — ACTIVITIES OF DAILY LIVING (ADL)
ADLS_ACUITY_SCORE: 48
ADLS_ACUITY_SCORE: 47
ADLS_ACUITY_SCORE: 39
ADLS_ACUITY_SCORE: 47
ADLS_ACUITY_SCORE: 39
ADLS_ACUITY_SCORE: 39
ADLS_ACUITY_SCORE: 48
ADLS_ACUITY_SCORE: 39
ADLS_ACUITY_SCORE: 47
ADLS_ACUITY_SCORE: 47
ADLS_ACUITY_SCORE: 39
ADLS_ACUITY_SCORE: 47
ADLS_ACUITY_SCORE: 47
ADLS_ACUITY_SCORE: 39
ADLS_ACUITY_SCORE: 48
ADLS_ACUITY_SCORE: 39
ADLS_ACUITY_SCORE: 39
ADLS_ACUITY_SCORE: 47
ADLS_ACUITY_SCORE: 48
ADLS_ACUITY_SCORE: 48
ADLS_ACUITY_SCORE: 47
ADLS_ACUITY_SCORE: 47
ADLS_ACUITY_SCORE: 39

## 2025-06-10 NOTE — PHARMACY-VANCOMYCIN DOSING SERVICE
Pharmacy Vancomycin Initial Note  Date of Service Sylvia 10, 2025  Patient's  1955  69 year old, male    Indication: Sepsis    Current estimated CrCl = Estimated Creatinine Clearance: 39.2 mL/min (A) (based on SCr of 1.84 mg/dL (H)).    Creatinine for last 3 days  2025:  5:18 AM Creatinine 0.57 mg/dL  2025:  8:00 AM Creatinine 0.66 mg/dL  6/10/2025:  3:31 AM Creatinine 1.47 mg/dL;  7:52 AM Creatinine 1.84 mg/dL    Recent Vancomycin Level(s) for last 3 days  No results found for requested labs within last 3 days.      Vancomycin IV Administrations (past 72 hours)        No vancomycin orders with administrations in past 72 hours.                    Nephrotoxins and other renal medications (From now, onward)      Start     Dose/Rate Route Frequency Ordered Stop    06/10/25 1130  piperacillin-tazobactam (ZOSYN) 3.375 g vial to attach to  mL bag         3.375 g  over 30 Minutes Intravenous EVERY 6 HOURS 06/10/25 1123      06/10/25 1130  vancomycin (VANCOCIN) 1,750 mg in sodium chloride 0.9 % 500 mL intermittent infusion         1,750 mg  over 2 Hours Intravenous ONCE 06/10/25 1125      06/10/25 1125  vancomycin place grant - receiving intermittent dosing         1 each Intravenous SEE ADMIN INSTRUCTIONS 06/10/25 1127      06/10/25 0900  norepinephrine (LEVOPHED) 16 mg in  mL infusion MAX CONC CENTRAL LINE         0.01-0.6 mcg/kg/min × 79.4 kg (Dosing Weight)  0.7-44.7 mL/hr  Intravenous CONTINUOUS 06/10/25 0850      06/10/25 0800  vasopressin 1 unit/mL MAX Conc (PITRESSIN) infusion         0.5-4 Units/hr  0.5-4 mL/hr  Intravenous CONTINUOUS 06/10/25 0758      25 1230  [Held by provider]  lisinopril (ZESTRIL) tablet 40 mg        (On hold since yesterday at 2153 until manually unheld; held by Gregoria Mccloud PA-CHold Reason: Other)   Note to Pharmacy: PTA Sig:Take 40 mg by mouth daily.      40 mg Oral DAILY 25 0921              Contrast Orders - past 72 hours (72h ago, onward)       Start     Dose/Rate Route Frequency Stop    06/10/25 1030  iopamidol (ISOVUE-370) solution 116 mL         116 mL Intravenous ONCE 06/10/25 1037    06/09/25 1709  iopamidol (ISOVUE-250) solution  Status:  Discontinued           PRN 06/09/25 1755    06/07/25 1530  iopamidol (ISOVUE-370) solution 107 mL         107 mL Intravenous ONCE 06/07/25 1527    06/07/25 1400  iohexol (OMNIPAQUE) 140 MG/ML solution for oral use 50 mL         50 mL Oral ONCE 06/07/25 1300                Plan:  Start vancomycin  1750 mg IV once followed by intermittent dosing.   Vancomycin monitoring method: Trough (Method 2 = manual dose calculation)  Vancomycin therapeutic monitoring goal: 15-20 mg/L  Pharmacy will check vancomycin levels as appropriate in 1-3 Days.    Serum creatinine levels will be ordered daily for the first week of therapy and at least twice weekly for subsequent weeks.      NOVA CASTILLO RP

## 2025-06-10 NOTE — PROGRESS NOTES
Oncology  Progress Note   Date of Service: 06/10/2025    Patient: Gamaliel Johnson  MRN: [id number]  Admission Date: 6/4/2025  Hospital Day # 6  Cancer Diagnosis: Metastatic pancreatic adenocarcinoma, Concern for secondary primary colon cancer  Primary Outpatient Oncologist: N/A  Current Treatment Plan: N/A    Recommendations/plan:   - Given patient's critical illness, we would not recommend chemotherapy at this time. We are very concerned regarding patient's overall prognosis and it is unclear/unlikely that patient will ever be able to be a chemotherapy candidate.   - We will continue to follow along    Assessment:   Gamaliel Johnson is a 69 year old male with medical history of hypertension, alcohol use disorder, HCV s/p treatment, and new diagnosis of metastatic pancreatic adenocarcinoma.    # Stage IV pancreatic adenocarcinoma  # Secondary peritoneal carcinomatosis  New diagnosis.  CT CAP 6/4 successful noted 3.9 distal pancreatic mass, as well as omental/mesenteric nodularity and mild perihepatic ascites.  During prior umbilical hernia repair surgery on 5/30, he was incidentally found to have peritoneal nodularity which was biopsied and revealed adenocarcinoma.  EUS with FNA biopsy of pancreatic mass resulted as adenocarcinoma in 6/5.  CT chest abdomen pelvis did not reveal any other metastatic sites. Importantly, the CK7+/CK20- was found in both pancreas and peritoneal biopsy is consistent with pancreatic primary. We have previously reviewed treatment for metastatic pancreatic cancer which involves palliative chemo and is dependent on performance status. Median overall survival for chemo regimens in patients with a performance status of PS 1-2 ranges from 4-8 months.   Patient has had a worsening bowel obstruction which raises concern about whether Gamaliel will reach treatment at all. Of note, he had a large bowel obstruction with colonic mass on colonoscopy. While this mass had the visible appearance of  primary colon cancer per GI, biopsy was benign. It is not clear if this was inadequate sampling. A metastasis from the pancreatic primary would be rare. I do not think an additional colon primary would change his situation as we have peritoneal metastasis consistent with pancreatic primary. With peritoneal carcinomatosis, he is not a surgical candidate.   His bowel obstruction appears to continue to be due to large bowel obstruction without an additional small bowel obstruction on CT.  He has a sigmoid colon mass but there may also be other factors contributing to his bowel obstruction-pain meds, recent surgery etc. It is reasonable to see if he will improve from the bowel obstruction standpoint, and if not, pursing supportive care with hospice. We discussed that if bowels remain obstructed and he cannot get nutrition then chemo is unlikely to give any benefit and only side effects.    As of 6/10, patient has had a significant clinical decline. Noted to have profound hypotension early on 6/10 with subsequent intubation to protect airway. At this time, there is further investigation regarding cause of his significant decline  - Given patient's critical illness, we would not recommend chemotherapy at this time. We are very concerned regarding patient's overall prognosis and it is unclear/unlikely that patient will ever be able to be a chemotherapy candidate.         Zina Brower PA-C  Hematology/Oncology   #[id number]    I spent >50 minutes in the care of this patient today, which included time necessary for review of interval events, obtaining history and physical exam, ordering medications/tests/procedures as medically indicated, review of pertinent medical literature, counseling of the patient, coordination of care, and documentation time. Over 50% of time was spent counseling the patient, discussing plan or coordinating care.       Subjective:    Intubated and sedated. Support given to family. Questions answered at  "bedside.      Physical Exam:    Blood pressure (S) (!) 60/44, pulse 92, temperature 97.8  F (36.6  C), temperature source Oral, resp. rate 20, height 1.702 m (5' 7.01\"), weight 86.3 kg (190 lb 4.1 oz), SpO2 100%.    General: Sedated. Appears comfortable.   CV: RRR  Resp: Intubated    Labs & Studies: I personally reviewed the following studies:  ROUTINE LABS (Last four results):  [facility]  Recent Labs   Lab 06/10/25  1223 06/10/25  0752 06/10/25  0750 06/10/25  0656 06/10/25  0336 06/10/25  0331 06/09/25  1612 06/09/25  1512 06/09/25  1350 06/09/25  1211 06/09/25  0800 06/08/25  1146 06/08/25  0518 06/07/25  2154 06/07/25  0539 06/05/25  2157 06/05/25  0624   NA  --  142  --   --   --  138 137 138   < >  --  138  --  138  --  136   < > 136   POTASSIUM  --  4.8  --   --   --  4.5 4.1 4.3   < >  --  3.1*   < > 2.8*  --  3.4   < > 3.8   CHLORIDE  --  105  --   --   --  104  --   --   --   --  103  --  106  --  107   < > 102   CO2  --  14*  --   --   --  17*  --   --   --   --  15*  --  19*  --  19*   < > 22   ANIONGAP  --  23*  --   --   --  17*  --   --   --   --  20*  --  13  --  10   < > 12   * 110* 106* 104*   < > 145* 91 93   < >  --  78   < > 99   < > 134*   < > 199*   BUN  --  20.7  --   --   --  20.0  --   --   --   --  10.5  --  8.8  --  10.5   < > 12.5   CR  --  1.84*  --   --   --  1.47*  --   --   --   --  0.66*  --  0.57*  --  0.62*   < > 0.71   GFRESTIMATED  --  39*  --   --   --  51*  --   --   --   --  >90  --  >90  --  >90   < > >90   ELÍAS  --  8.0*  --   --   --  8.4*  --   --   --   --  8.5*  --  7.9*  --  8.0*   < > 9.2   MAG  --   --   --   --   --  2.2  --   --   --  1.8  --   --   --   --   --   --  2.0   PHOS  --   --   --   --   --  5.8*  --   --   --   --   --   --   --   --   --   --   --    PROTTOTAL  --  4.1*  --   --   --  4.7*  --   --   --   --  5.8*  --   --   --  5.8*  --  6.9   ALBUMIN  --  2.1*  --   --   --  2.6*  --   --   --   --  2.8*  --   --   --  2.6*  --  3.6   BILITOTAL  --  " 1.8*  --   --   --  2.2*  --   --   --   --  1.0  --   --   --  0.4  --  0.5   ALKPHOS  --  44  --   --   --  48  --   --   --   --  67  --   --   --  66  --  69   AST  --  153*  --   --   --  50*  --   --   --   --  25  --   --   --  21  --  20   ALT  --  67  --   --   --  21  --   --   --   --  19  --   --   --  10  --  11    < > = values in this interval not displayed.     CBC  Recent Labs   Lab 06/10/25  0752 06/10/25  0331 06/10/25  0106 06/09/25  2150 06/09/25  1350 06/09/25  1033   WBC 11.0 9.8  --  8.1  --  5.4   RBC 3.22* 3.72*  --  2.95*  --  4.82   HGB 9.4* 10.9*  10.9* 10.8* 8.9*   < > 14.2   HCT 29.7* 32.5*  --  26.6*  --  42.2   MCV 92 87  87 88 90  --  88   MCH 29.2 29.3  --  30.2  --  29.5   MCHC 31.6 33.5  --  33.5  --  33.6   RDW 15.6* 14.6  --  14.1  --  13.7    319  --  301  --  397    < > = values in this interval not displayed.     INR  Recent Labs   Lab 06/10/25  0854 06/09/25  0933 06/04/25  0643   INR >10.00* 3.62* 1.36*     IMAGING:  CT CHEST/ABDOMEN/PELVIS W CONTRAST  6/4/25  IMPRESSION:       Multiple right lower lobe pulmonary emboli. Probable evolving right  lower lobe pulmonary infarct.     Ascites. Mesenteric nodularity most compatible with carcinomatosis.     Probable 3.9 cm distal pancreatic mass versus peripancreatic  metastatic disease. Consider MR abdomen WWO plus MRCP for further  assessment.    Colonoscopy 6/4/2025  Impression:    Colonic mass at the level of the sigmoid                          colon/descending colon, biopsies were performed to                          confirm primary malignancy. A 25 mm by 12 cm Han[place]                          stent was placed with proximal end at 25 cm with great                          difficulty under fluroscopy     PATHOLOGY  Case: [id number]   5/30/25  A.  Umbilicus, mass, excisional biopsy:  - Fibroadipose tissue with metastatic adenocarcinoma (see comment).     B.  Omentum, excisional biopsy:  - Omental adipose tissue with  metastatic adenocarcinoma (see comment).    Case: [id number]  6/5/25  A. SIGMOID COLON MASS, BIOPSY:  - Colonic mucosa with no significant histologic abnormality, see comment    [id number]  6/5/25  Specimen A  Pancreas, PANCREAS TAIL LESION, Fine Needle Aspirate:                 Interpretation:                  Positive for malignancy  Adenocarcinoma                 Adequacy:                 Satisfactory for evaluation        Specimen B  Other, PERITONEAL IMPLANTS, Fine Needle Aspirate:                 Interpretation:                  Positive for malignancy  Metastatic adenocarcinoma                 Adequacy:                 Satisfactory for evaluation

## 2025-06-10 NOTE — PROGRESS NOTES
"VASCULAR BRIEF NOTE    Paged regarding bleeding around RLE fasciotomy dressing. When I arrived to bedside the gravity dependent portion of ace/kerlix/abds were saturated with blood. Notably, he was able to wiggle his toes and cap refill was brisk. Sensation was intact, \"maybe a little number\" than baseline. Attention was turned back to the bleeding. A josefina underneath was saturated and RN reported 2 pillowcases had also been saturated when the bleeding was found. The dressing was taken down to investigate and the problem at the distal aspect of the lateral fasciotomy site was immediately identified. Despite exploration and packing, the bleeding was not controlled and the vessel could not be identified. Both wounds were generally oozy but the majority of bleeding was coming from this site, refractory to pressure. Around this time, the PTT came back supratherapeutic and the patient's Bps trended soft, with MAPs <65. 2 units of blood were ordered to transfuse and stat hgb was ordered, which resulted at 8.9. around this time, the pt became somnolent but remained arousable throughout. He was thus transfused 2 units for symptomatic anemia, as well as based on my estimation of 1-2 units of blood loss on dressings. He responded with a return of mentation to the baseline of his visit and improved MAPs.  3 sutures were placed to the wound, please refer to procedure note. The bleeding was controlled and the wound was drier. It was rewrapped. Strong monophasic peroneal signal was present. The vascular surgery fellow, attending, surgery ryan, and hospitalist were present at bedside during this event. Noted that UOP was low throughout the event.    The R femoral incision was c/d/I without hematoma.     The daughter was present at bedside and updated.    - q2h trend hgb x 3  - compression dressing  - cont CMS checks   - cont heparin gtt - risk of limb loss outweighs risk of fasciotomy bleeding  - trend UOP, crystalloid " resuscitation prn, per primary  - appreciate excellent bedside nursing assistance and hospitalist care      Zina Harris MD   P: [phone]  Please refer to [facility] for point of contact. Page is preferred.

## 2025-06-10 NOTE — PROCEDURES
Arterial Line Insertion  Date of Service: 6/10/2025  Pre-procedure diagnosis: acute respiratory failure with hypoxia  Location: left femoral  Performed by: Brett Manning MD    Indications:  The patient is a 69 year old male with a history of pancreatic cancer who presented with shock and respiratory failure requiring vasopressor management and arterial line placement.    Procedure details:   Consent for the procedure was deemed emergent. The left groin was prepped and draped in sterile fashion. Using ultrasound guidance, the introducer needle was inserted into the radial artery with return of pulsatile flow. The syringe was removed and a guidewire was advanced through the introducer needle which was then withdrawn. A small incision was made at the skin surface with a scalpel and the arterial catheter was advanced over the wire, after which the wire was removed. An arterial waveform was confirmed on the monitor. The catheter was sutured to the skin and a sterile dressing was applied. The patient tolerated the procedure without any hemodynamic compromise.     Brett Manning MD

## 2025-06-10 NOTE — PROGRESS NOTES
Urology Brief Note:    A consult was placed today for urology Carey placement, but per RN notes the patient is declining a Carey and wants to try voiding on his own.  This was also confirmed with the provider on call.  Will therefore cancel consult.     In patients who have ascites, bladderscanner results can indeed be inaccurate.  There are therefore two ways to verify postvoid residuals - 1) the patient could be straight catheterized after he voids - measured outputs would be the postvoid residual.  2) A formal bladder ultrasound (with radiology) can also be used to calculate bladder volumes.     [facility]

## 2025-06-10 NOTE — PROGRESS NOTES
Gastroenterology Consultation  GI Advanced Endoscopy/Pancreaticobiliary Service    Date of Admission:  6/4/2025  Date of Consult  6/4/2025   Reason for consult:     new adenocarcinoma, concern for panc primary, bowel obstruction may be due to carcinomatosis - consideration for stent      Requesting Physician:  Kenan Brooks DO  PATIENT:  Gamaliel Johnson  MRN:         [id number]          ASSESSMENT AND RECOMMENDATIONS:   Assessment:  Gamaliel Johnson is a 69 year old male who was admitted on 6/4/2025 for N/V, abdominal distension, and lack of BM or passing gas since 6/2/25.  Patient had undergone open umbilical hernia repair on 5/30/25.  There was nodularity, induration and omental caking; no immediate path was available the hernia sample and omental biopsy were sent and today they returned as adenocarcinoma of unknown origin.  CT c/a/p shows new PE and possible pancreatic mass, and bowel obstruction around the sigmoid colon.  Patient was transferred from [place], [place] for surg onc, GI, and oncology evaluation.       Patient has PMH of HTN, treated HCV, AUD drinking about 6 pack a day for many years, and marijuana use.  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  Daughter is involved and lives in the [facility].  No FH of pancreatic cancer, uncle with colon cancer.    Patient was clinically stable and well appearing at admission, good functional status however course has been complicated with PE, acute limb ischemia, partial bowel obstruction, and rapid clinical deterioration.     # Umbilical hernia with poorly differentiated adenocarcinoma of unknown origin  # Large bowel obstruction secondary to mass in the sigmoid colon, s/p colonoscopy with stent placement  - Bx without malignancy, although unclear if adequately sampled  #c/f Partial SBO (imaging 6/7/25)  # Peritoneal carcinomatosis  # Distal pancreatic mass, s/p EUS FNA on 6/6/25 with poorly differentiated carcinoma -- most likely  primary  # New PE, RLL, segmental, with possible infarct  # Acute R limb ischemia s/p emergency vascular surgery on 6/9/25  - Right iliofemoral thrombectomy from groin incision, right popliteal and tibial thrombectomy from calf incision, posterior tibial and dorsalis pedis thrombectomies from separate pedal incisions, 4 compartment fasciotomy, right common iliac artery angioplasty and stenting.       Recommendations:  -  The advanced endoscopy team can be re-consulted if a venting G-tube becomes necessary with goals of going home on hospice although currently patient is critically ill and would recommend ongoing GOC discussion/   - Appreciate excellent supportive care per ICU team    Thank you for involving us in this patient's care. Please do not hesitate to contact the GI service with any questions or concerns.  The patient was discussed and plan agreed upon with Dr. Christianson.  The inpatient GI team will sign off.     Abel Sherman (Manoj Romerogavin)DO MS  Gastroenterology Fellow  [facility]  Text Page or Vocera           History of Present Illness:   S/p emergency vascular surgery for acute limb ischemia, c/b fasciotomy site bleeding which was repaired at bedside.  Now he is in the ICU with lactic acidosis and hypotension.             Review of Systems:     Intubated/sedated         Physical Exam:   Temp: 97.8  F (36.6  C) Temp src: Oral BP: (S) (!) 60/44 (RRT called) Pulse: 88   Resp: 20 SpO2: 98 % O2 Device: Mechanical Ventilator Oxygen Delivery: (S) 15 LPM        General Appearance: Intubated/sedated  Respiratory: Breathing comfortably on the vent  FiO2 (%): 70 %, Resp: 20, Vent Mode: VC/AC, Resp Rate (Set): 18 breaths/min, Tidal Volume (Set, mL): 480 mL, PEEP (cm H2O): 5 cmH2O, Resp Rate (Set): 18 breaths/min, Tidal Volume (Set, mL): 480 mL, PEEP (cm H2O): 5 cmH2O  Cardiovascular: Hypotensive on 3 pressors  GI: soft, distended mildly tender surgical site c/d/i, no peritoneal  sign  Extremities: R LE wrapped  Neuro: Intubated/sedated    _______________________________________________________________  Data:  Labs and imaging for last 24 hours were independently reviewed and interpreted

## 2025-06-10 NOTE — ANESTHESIA POSTPROCEDURE EVALUATION
Patient: Gamaliel Johnson    Procedure: Procedure(s):  Right iliofemoral Thrombectomy, popliteal thrombectomy through separate incision, dorsalis pedis and posterior tibial thrombectomy through separate incisions  Angiogram Right Leg  Fasciotomy lower extremity Right       Anesthesia Type:  General    Note:  Disposition: Inpatient   Postop Pain Control: Uneventful            Sign Out: Well controlled pain   PONV: No   Neuro/Psych: Uneventful            Sign Out: Acceptable/Baseline neuro status   Airway/Respiratory: Uneventful            Sign Out: Acceptable/Baseline resp. status   CV/Hemodynamics: Uneventful            Sign Out: Acceptable CV status; No obvious hypovolemia; No obvious fluid overload   Other NRE: NONE   DID A NON-ROUTINE EVENT OCCUR? No           Last vitals:  Vitals Value Taken Time   BP 98/65 06/09/25 19:45   Temp 36.7  C (98  F) 06/09/25 19:30   Pulse 102 06/09/25 19:56   Resp 19 06/09/25 19:56   SpO2 98 % 06/09/25 19:56   Vitals shown include unfiled device data.    Electronically Signed By: Phu Varner MD  June 9, 2025  7:57 PM

## 2025-06-10 NOTE — PLAN OF CARE
Goal Outcome Evaluation:  Major Shift Events: RASS -1, opening eyes to command and moving all extremities. SR-ST, norepi and vaso infusing to maintain MAP goal >65, hypothermic to now mildly febrile, tmax 99.9. Pan cultures sent. Intubated, ETT 22 @ lip, CMV settings 50%, 500, 15 & 5, scant ETT/oral secretions, Clear/dim LS. Abd distension/taut, NG to LIS, initial output 400 ml brown/red output. No BM. No meds given via NG, all IV dosing. Carey placed per urology, scant UOP- tea colored. UE bruising and RLE fascio site. L fem infusing and L PIV infusing. L fem art line pulsatile. Propofol & fentanyl, norepi and vaso and LR @ 125 ml/hr. Increase in fentanyl gtt today to 100 mcg/hr. K and Mg repaced. Abd/pelvic CT today. Family present at bedside and care conference regarding POC and code status. Family decided to change code status to no compressions, okay for meds and shock.    Plan: Potential for comfort cares, discussion pending per family   For vital signs and complete assessments, please see documentation flowsheets.

## 2025-06-10 NOTE — PROCEDURES
Central Line Procedure Note  DATE OF PROCEDURE: 6/10/2025  PRE-PROCEDURE DIAGNOSIS: Shock  PROCEDURE PERFORMED: Left Femoral Central Venous Catheter Placement  PERFORMED BY: Brett Manning MD   COMPLICATIONS: None  INDICATIONS FOR PROCEDURE:   The patient is a 69 year old male with shock, who requires a central line for IV access.   DESCRIPTION OF OPERATIVE PROCEDURE:   Consent for the procedure was emergent. A time out was performed. The patient was placed in Trendelenburg position. The left groin region was prepped and draped in sterile fashion. Anesthesia was achieved with lidocaine. Using ultrasound guidance, the introducer needle was inserted over the left femoral vein. Venous blood was aspirated. The syringe was removed and a guidewire was advanced through the introducer needle which was then withdrawn. A small incision was made at the skin surface with a scalpel and a dilator was advanced over the guidewire. After appropriate dilation was obtained, the dilator was then removed and an 8.5 Frisian, triple-lumen central venous catheter was advanced over the wire. The wire was removed. All ports easily flushed and aspirated. The catheter was sutured in place and a sterile dressing was applied. The patient tolerated the procedure without any hemodynamic compromise.   Brett Manning MD

## 2025-06-10 NOTE — ANESTHESIA PROCEDURE NOTES
Airway       Patient location during procedure: ICU       Procedure Start/Stop Times: 6/10/2025 8:05 AM  Staff -        Anesthesiologist:  Barb Anderson MD       Resident/Fellow: Shane Powell MD       Performed By: resident and with residents       Procedure performed by resident/fellow/CRNA in presence of a teaching physician.    Consent for Airway        Urgency: emergent       Consent: The procedure was performed in an emergent situation.  Report Obtained from Primary Care Team       History regarding most recent potassium obtained: Yes       History regarding presence/absence of renal failure obtained:Yes       History regarding stroke/CVA obtained:Yes       History regarding presence/absence of NM disorder: YesIndications and Patient Condition       Indications for airway management: airway protection, altered level of consciousness, hemodynamic instability and respiratory insufficiency       Mallampati: Not Assessed     Induction type:RSI       Mask difficulty assessment: 1 - vent by mask    Final Airway Details       Final airway type: endotracheal airway       Successful airway: ETT - single  Endotracheal Airway Details        ETT size (mm): 8.0       Cuffed: yes       Successful intubation technique: video laryngoscopy       VL Blade Size: Glidescope 4       Grade View of Cords: 1       Adjucts: stylet       Position: Center       Measured from: gums/teeth       Secured at (cm): 22       Bite block used: None    Post intubation assessment        ETT secured, Vent settings by primary/ICU team, Primary/ICU team to review CXR, Sedation to be ordered by primary/ICU team and No apparent complications       Placement verified by: capnometry and equal breath sounds        Number of attempts at approach: 1       Number of other approaches attempted: 0       Secured with: commercial tube grant       Ease of procedure: easy       Dentition: Intact    Medication(s) Administered   etomidate (AMIDATE) injection -  Intravenous   6 mg - 6/10/2025 8:05:00 AM  rocuronium (ZEMURON) 10 mg/mL injection - Intravenous   100 mg - 6/10/2025 8:05:00 AM  Medication Administration Time: 6/10/2025 8:05 AM

## 2025-06-10 NOTE — CARE CONFERENCE
Additional Information:    Scheduling Care Conference    Time & Location:   Per SICU team request scheduling Care Conference for today 6/10/25 at 1:45 pm pre-conference and 2 pm - meeting with family at 4 A conference room.     Providers:   Palliative- Rebecca Barkley - sent Vocera  SICU - Judy Hatch - May be delayed.     Family:   Daughter Elsa Stallings [phone]  Son Juice Johnson [phone]    Call into conference information:   [phone]   Meeting  [phone]  [phone]     Emily Ledezma, MSN, MA, RN, [facility]  4C/4A/4E ICU Care Coordinator  [facility] Acute Care Management  Phone:[phone]  Available on Vocera: 4C MICU RNCC 4E SICU RNCC 4A CVICU RNCC

## 2025-06-10 NOTE — PROCEDURES
VASCULAR SURGERY PROCEDURE NOTE    Paged regarding bleeding around RLE fasciotomy dressing. The dressing was taken down to investigate and the problem at the distal aspect of the lateral fasciotomy site was immediately identified. Despite exploration and packing, the bleeding was not controlled and the vessel could not be identified. Both wounds were generally oozy but the majority of bleeding was coming from this site, refractory to pressure.     The skin beyond the distal aspect of the lateral fasciotomy site was prepped and injected with 5 ml 1% lidocaine. The skin was oversewn with two figure-of-eight 0-silk sutures, to tack the subcutaneous fascial dissection plane which contained the source of venous bleeding. The wound was drier. An additional figure-of-8 was applied to a skin bleeder along this distal edge. The fasciotomy wound was packed with 2 pieces of quick clot. The medial fasciotomy site was dressed with fresh xeroform. Abd pads, kerlix, and ace was used to finish dressing. The wound was hemostatic at the end.         Zina Harris MD   P: [phone]  Please refer to [facility] for point of contact. Page is preferred.

## 2025-06-10 NOTE — OP NOTE
LOCATION: [facility]    PREOPERATIVE DIAGNOSIS: Acute right leg ischemia, stage IIa  POSTOPERATIVE DIAGNOSIS: Same  PROCEDURE: Right iliofemoral thrombectomy from groin incision, right popliteal and tibial thrombectomy from calf incision, posterior tibial and dorsalis pedis thrombectomies from separate pedal incisions, 4 compartment fasciotomy, right common iliac artery angioplasty and stenting.    SURGEON: Bruno Zamudio MD  ASSISTANT: Mandy Hackett MD  ANESTHESIA: General endotracheal    EBL: 250 ml  SPECIMEN: Intra-arterial thrombus    INDICATION: 69 yr-old man presented to the hospital with metastatic pancreatic cancer, pulmonary embolism, and bowel obstruction who was undergoing palliative procedures such as colonic stenting and was planned to undergo venting G-tube to relieve obstructions.  He has been therapeutically anticoagulated with heparin, with intermittent pauses in the anticoagulation to undergo interventions.  He also has a history of heavy ethanol abuse and has ascites.  Patient developed acute right foot pain, 10 out of 10.  Patient was noted to have no right femoral pulse and no distal pulses as well as no pedal Doppler signals in the right foot.  In the left leg, he has a strong left femoral pulse, strong dorsalis pedis Doppler signal in the left foot and no left posterior tibial Doppler signal.  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  Despite his metastatic cancer and short life expectancy, he has been fairly functional up until this hospitalization and has lived independently.  I do not believe he will heal an above-knee amputation and I think adequate pain control would not be possible without leg amputation.  Plan is to perform emergent palliative right leg revascularization and fasciotomies for pain  control.    FINDINGS: Patient has a combination of acute and chronic ischemia that made the operation extremely challenging.  He had the chronic right common iliac artery stenosis that was successfully treated with an 8 mm balloon expandable covered stent (VBX) and postdilated to 10 mm.  The combination of thrombectomy and stenting resolved all inflow arterial occlusive disease.  He had significant chronic popliteal and tibial artery occlusive disease as well as significant thrombus in the tibial arteries.  His posterior tibial artery is chronically occluded throughout its length.  The anterior tibial artery is patent in the upper two thirds of the calf and has a proximately 8 cm long chronic occlusion just above the ankle.  The dorsalis pedis reconstitutes just distal to the ankle it is the only named vessel in the foot.  The peroneal artery is his only tibial runoff and fills the dorsalis pedis artery through collaterals.  All 4 compartments had pink viable muscle that contracted with electrocautery at the conclusion of the case.  The patient was maintained on therapeutic anticoagulation with an unfractionated heparin drip throughout the case.    DESCRIPTION OF OPERATION: The patient was administered general anesthesia.  The patient was prepped with chlorhexidine and sterilely draped.  Ioban was placed over the groin and right leg.  We initially made a standard oblique incision in the right groin, converted our dissection into a longitudinal direction below the fascia to limit disruption of the lymphatics.  Common femoral, deep and superficial femoral arteries were circumferentially controlled with Silastic Vesseloops.  The femoral arteries were soft and free of significant atherosclerotic occlusive disease and had no pulse.  The patient's activated clotting time was therapeutic and he was maintained on heparin drip.    We made a transverse arteriotomy in the common femoral artery and performed thrombectomy  proximally with a #5 embolectomy catheter.  We retrieved organized thrombus and moderately pulsatile arterial bleeding.  The embolectomy catheter would not advance beyond about 25 cm.  We performed thrombectomy of the deep and superficial femoral arteries using a #4 embolectomy catheter and retrieved a large amount of organized thrombus and ultimately slow dark red backbleeding.  Once our pass is no longer retrieved thrombus, we closed the arteriotomy with a running 5-0 Prolene suture.    We placed a 4 Romansh sheath into the right right common femoral artery.  A retrograde sheath gram was unable to get contrast into the aorta.  We then advanced a 0.035 angled Glidewire into the aorta and exchanged the wire for a 5 Romansh Kumpe catheter.  An abdominal aortogram was obtained.  Right lower extremity angiogram was performed which demonstrated abrupt occlusion of the popliteal artery just below the knee joint.  The Kumpe catheter was then exchanged for a 0.035 Super Stiff Amplatz wire.  We advanced an 8 x 29 mm VBX balloon expandable stent graft and deployed it in the common iliac artery stenosis.  Repeat angiogram was performed.  The stent was postdilated with a 10 x 40 mm balloon.  Repeat angiograms were performed in different obliquities demonstrated the common iliac artery was widely patent.  The distal end of the stent graft was within the 2 cm aneurysm, but there was no longer obstruction of flow and no filling defects.  The hypogastric artery remained widely patent.  We removed the Amplatz wire and left the sheath in place.    We exposed the below the knee popliteal artery through medial incision in which we plan to continue to perform our fasciotomies for the superficial and deep compartments.  Initially we exposed the popliteal artery and circumferentially controlled with Silastic Vesseloops.  In this area, the popliteal artery was soft, but had no pulse.  We created a transverse arteriotomy and performed  thrombectomy both proximally and distally in the popliteal artery using #3 embolectomy catheter.  We obtained strongly pulsatile arterial bleeding from the proximal end.  We achieved slow dark backbleeding from the distal end.  We performed a large number of passes and continued getting organized thrombus.  Sometimes the catheter would not advance 30 cm and of the times it would only advance 7 to 10 cm.  Ultimately we stopped getting organized thrombus.  We closed the transverse arteriotomy with interrupted 6-0 Prolene sutures.  At this point, the foot had not pinked up. We extended our medial calf incision and decompressed the posterior superficial and deep compartments.  We exposed the trifurcation of the popliteal artery, thinking we could potentially perform a more directed tibial thrombectomy.  The trifurcation was circumferentially calcified and noncompressible.  I do not think attempting an arteriotomy in this area was safe.    We made a longitudinal incision overlying the posterior tibial artery at the ankle.  The posterior tibial artery was circumferentially controlled with Silastic Vesseloops.  The posterior tibial artery was circumferentially hard and calcified.  We extended our incision more proximally and the artery was still firm, but less hard.  We created a transverse arteriotomy and noted that the vessel was essentially chronically thrombosed.  There was no lumen distally and there was less than 1 mm balloon proximally.  We advanced a #2 embolectomy catheter proximally and the catheter only went about 5 cm before it hit hard endpoint.  This transverse arteriotomy was closed with interrupted 7-0 Prolene sutures.    We performed another completion angiogram from the femoral sheath.  This demonstrated a filling defect in the popliteal artery just above our arteriotomy.  The entire posterior tibial artery was chronically occluded other than the origin.  The peroneal artery was patent and providing  collaterals to the dorsalis pedis artery.  The anterior tibial artery was occluded in the distal third of the calf.  We thought maybe the anterior tibial artery was full of thrombus and potentially salvageable.  We made a longitudinal incision overlying the dorsalis pedis artery, and we circumferentially controlled the dorsalis pedis artery with Silastic Vesseloops.  The dorsalis pedis artery was soft and free of significant atherosclerotic disease, but very small.  We made a transverse arteriotomy and performed embolectomy with #2 embolectomy catheter.  Proximally the catheter would only pass to the ankle joint, before it encountered a chronic occlusion.  Distally the catheter went approximately 10 cm.  No clot was retrieved with passage of the embolectomy catheter.  The arteriotomy was closed with interrupted 7-0 Prolene sutures.  Next our attention was turned to the popliteal artery where there had been a filling defect identified.  We occluded the popliteal artery proximally distally and we created a longitudinal arteriotomy across our previous transverse arteriotomy.  We found an adherent organized mural thrombus just above our arteriotomy.  This was removed the vessel was flushed and the arteriotomy was closed with an 8 mm wide bovine pericardial patch and a running 6-0 Prolene suture.  Flushing maneuvers were performed and the clamps were released.  Repeat angiogram demonstrated the filling defect was no longer present in the popliteal artery and there was continuous runoff through the peroneal artery which reconstituted the dorsalis pedis artery.    We obtained hemostasis with electrocautery, thrombin-soaked Gelfoam, additional 6-0 Prolene sutures, hemoclips, and electrocautery.  Once the wounds are hemostatic I closed the groin in multiple layers with 2-0 and 3-0 Vicryl in the fascia and 4-0 Monocryl subcuticular in the skin.  Dermabond was used as a dressing.  Dr. Torre closed the dorsalis pedis and  posterior tibial artery access incisions in a single layer with interrupted 3-0 nylon vertical mattress sutures.  She also reapproximated the deep fascia to cover the popliteal artery with a running 2-0 Vicryl suture.  The fasciotomy wounds were packed open with Xeroform, Kerlix, ABDs, more Curlex and an Ace wrap.  The patient was extubated and transported to recovery room hemodynamically stable.

## 2025-06-10 NOTE — PROGRESS NOTES
Medicine Cross Cover Note     June 9, 2025 10:02 PM    I was notified by RN at approximately 2030 the patient had arrived back on the floor.  Shortly thereafter was noted he was having a significant amount of bleeding from his fasciotomy site that was bleeding through his Ace wrap.  I recommended RN contact vascular surgery which RN promptly did.  Vascular surgery reached out at approximately 2145 that patient continued to have bleeding and they were applying pressure.      I did go examine patient at bedside.  He is awake alert and orientated, pain under fair control.  Hypotensive to 80s/60s, MAP in the low 60s. Vascular surgery ordered hemoglobin check and 2 units of PRBCs.  I did exam and surgical excision with vascular surgery and there was active extravasation.       Plan  - Appreciate assistance from vascular surgery and bedside nursing   - Agree with transfusing 2 units now.  - Vascular surgery attempting to pressure dress at bedside. If unsuccessful, pt may need to return to the OR   - See notes from vascular surgery to follow     Update 2250  Discussed with vascular surgery.  Surgery was able to successfully control bleeding at bedside.  Hemoglobin returned down to 8.9 from 14.2 preoperatively but suspect hemoglobin may ultimately have trended lower.  2 units of blood currently ordered and transfusing.  Also note patient is only 25 cc of urine output in the past 2 hours and is likely dehydrated.  - Recheck hemoglobin at 0200 after blood has transfused  - Patient also receiving LR at 75 cc/h  - Recommend bolus of crystalloid fluids if urine output remains low      Kenan Harmon PA-C  Internal Medicine [facility]  Pager [phone]        Approximately 15 minutes spent by me on care coordination, medical decision making, and patient examination.

## 2025-06-10 NOTE — PROGRESS NOTES
Vascular Surgery Progress Note    Patient this am w/ hypotension to 60s/40s at lowest, tachypnea, tachycardia. Fluids started, blood ordered. Stat labs sent.     Patient examined, RLE with no active hemorrhage or bleeding from fasciotomy sites.. RLE with strong biphasic popliteal artery signal. No doppler signal in RLE foot however overall patient with pink appearing toes and brisk cap refill this am.     Stat labs w/ hgb 9.4 from 10.9 earlier this am, not significant enough to explain significant persistent hypotension.   Peak lactic acid of 11.5.   INR >10     Transferred to ICU where he was intubated.     NE at 0.06, vaso at 2.4 - w/ stabilization.     With lactic elevation c/f possible mesenteric ischemia, possible bowel perforation, sepsis, cardiogenic shock.      Given stabilization on pressors, patient sent for stat CTA CAP.    I discussed his care at bedside w/ his daughter and explained my concerns regarding acute change in sx and what this likely means.   I also shared that even if we get a CTA CAP and identify a cause for his clinical change that if it is intra-abdominal we will likely not have a surgical option to offer in terms of fixing said issue. She expressed understanding but overall expressed that she wanted to do everything right now and would most like answers about what happened to cause the acute change for him.     CTA obtained, per my review appears patient has perforated bowel with free air in the abdomen.      EGS has been consulted, however given patient has peritoneal carcinomatosis, significant ascites, and is not a candidate for surgical intervention for his pancreatic cancer, do not anticipate there will be any surgical intervention     SICU has planned family care conference at 2pm to further discuss goals of care.     Please page vascular surgery w/ questions    Darrell Hackett MD   Vascular Surgery PGY4  Pager: [phone]     Please page me directly with any questions/concerns  during regular weekday hours before 5PM. If there is no response, if it is a weekend, or if it is during evening hours, then please use the [facility] system to page the first-call resident on call for vascular surgery.

## 2025-06-10 NOTE — CONSULTS
Care Management Follow Up    Length of Stay (days): 6    Expected Discharge Date: 06/11/2025     Concerns to be Addressed: discharge planning, care coordination/care conferences, decision making     Patient plan of care discussed at interdisciplinary rounds: Yes    Anticipated Discharge Disposition: Palliative Care, Hospice  Disposition Comments: comfort v. GIP v. home hospice, pending     Anticipated Discharge Services: None  Anticipated Discharge DME: None    Patient/family educated on Medicare website which has current facility and service quality ratings: no  Education Provided on the Discharge Plan: Yes  Patient/Family in Agreement with the Plan: yes    Referrals Placed by CM/SW: External Care Coordination  Private pay costs discussed: Not applicable    Discussed  Partnership in Safe Discharge Planning  document with patient/family: No     Handoff Completed: No, handoff not indicated or clinically appropriate    Additional Information:  SW consult for Hospice acknowledged. Writer reached out to palliative provider to consult and gather update on goals of care. Palliative provider met with pt and family; care conference was held at 2pm today.     Attendees:  SICU- Dr. Brett Manning  Palliative- MALDONADO Diaz CNP, Jesi Plasencia MSW, [facility]  ICU Social Work- NORMAN Dias, [facility]  Family: Daughter: Lisa, Son: Juice (present in person)     Narrative: Provider requested a Goals of Care conference to include the above listed parties. Pt's medical picture, code status, and goals of care discussed. Elsa and Juice shared their father's previous goal of going home with hospice, prior to his rapid clinical deterioration. Family considering the choice to move to comfort care with a change in code status.      Plan: Elsa and Juice request time to process information given the abrupt changes over the past 24 hours; considering moving to comfort focus; they would appreciate continued support from care  management SW if Gamaliel were to move to comfort cares and it was indicated that it would be safe to transport him home with hospice, as this was his previously expressed goal.      Next Steps: Care management will continue to follow; if pt moves to comfort focus and is extubated/medically stable for transport, available to support with discharge planning with home hospice, per family's request.     NORMAN Dias, LICSW   4C/4E/4A   [facility] Acute Care Management   Phone:  [phone]   Available on Vocera:  4C MICU SW, & 4E SICU SW

## 2025-06-10 NOTE — PLAN OF CARE
Hours of care : [id number]    See previous RN notes and provider notes for details on bedside procedure performed on RLE by thoracic surgery. 2 units of PRBC were given for active blood loss from RLE fasciotomy site causing soft blood pressures and 8.9 hemoglobin down from preop 14.2. Hemoglobin rechecks were stable overnight at 10.8 and 10.9. Blood pressures improved after PRBC and maintenance LR at 125 mL/hr. RLE continued to bleed overnight and saturated the dressings, but was significantly slowed from before. Dr. Harris came to bedside to assess and stated no dressing change or intervention were necessary at that time. PTT supratherapeutic x2 overnight; continued to follow heparin protocol per vascular surgery team.    Only 100 mL of concentrated urine output since 2000. Bladder scanned for 300-400 mL but patient has significantly distended abdomen with known ascites, making bladder scan difficult. Dr. Salazar  was updated on these findings. UA and AM labs collected showing a rise in creatinine from 0.66 to 1.47. King was irrigated several times with no change in output. Dr. Salazar recommended to exchange the King catheter to ensure it was positioned properly. King was removed and new king inserted but was unable to get urine return. Tried to attempt with a coude catheter but patient declined and stated he wanted to try to void.    Patient also having increased abdominal discomfort/fullness starting ~0445. Patient with RR 30-36 and breathing shallowly due to distension. Dr Salazar assessed at bedside and contacted IR to try to expedite planned paracentesis for this morning. At 0630, pt required 2 L NC to maintain sats above 92%. At 0645 patient became hypotensive with BP 60/44 with MAP 51. A rapid response was immediately called. See rapid response note. Patient was transferred to .    Patient's daughter, Elsa, called 6B and was updated that patient had been transferred to 4E.     See flowsheets for detailed  assessments.

## 2025-06-10 NOTE — PROGRESS NOTES
Transfer  Transferred from: PACU  Via:bed  Reason for transfer: Pt appropriate for 6B- post surgery  Family: Aware of transfer  Belongings: Received with pt  Chart: Received with pt  Medications: Meds received from old unit with pt  Code Status verified on armband: yes  2 RN Skin Assessment Completed By: TIANNA Sanford & TIANNA Peña  Med rec completed: yes  Suction/Ambu bag/Flowmeter at bedside: yes    Report received from: Jossie RN    Pt status: Received pt alert and oriented x4, w/stable v/s. Upon assessment, noted significant bleeding from RLE surgical site. ACE wraps around the area soaked. Provider updated. Vascular Surgery provider came, opened site and controlled bleeding. Re-wrapped site afterwards w/ Kerlix and ACE wraps. PRBC 2U transfused as ordered. PRN Tylenol and Dilaudid given for pain. Had episodes of soft Bps 70s-90s systolic, provider in room aware. Critical result for PTT >240 relayed to provider, protocol followed, paused for 60 mins and resumed at  1150 U/hr after 1 hour. Per Vascular, they want to continue Heparin drip, ok to follow protocol. Urine output noted only 25cc for 2 hours. Per provider DUSTIN Harmon, the blood should help and ok to keep LR running at 75 ml/hr. Pt placed on NPO except ice chips for now.     Still ongoing blood transfusion w/ 2nd unit by end of shift w/ latest /68 .

## 2025-06-10 NOTE — H&P
SURGICAL ICU H&P  06/10/2025      PRIMARY TEAM:  Hospitalist Service, GOLD TEAM   PRIMARY PHYSICIAN: Dr. Oglesby    REASON FOR CRITICAL CARE ADMISSION: Hemodynamic support, ventilator requirements  ADMITTING PHYSICIAN: Joshua Valerio MD       ASSESSMENT: Gamaliel Johnson is a 69 year old male w/ newly diagnosed stage IV pancreatic adenocarcinoma with secondary large and small bowel obstructions, and acute pulmonary emboli. He is s/p  Right iliofemoral Thrombectomy, popliteal thrombectomy through separate incision, dorsalis pedis and posterior tibial thrombectomy through separate incisions, Angiogram right leg, right leg  fasciotomy with Dr. Zamudio, admitted to the SICU on 6/10 after developing profound hypotension requiring emergent intubation.    PMH: hepatitis C, compensated cirrhosis, HTN, tobacco use (chew), EtOH use disorder, possible EtOH withdrawal seizures (on keppra intermittently).      Changes Today:  - Repeat ABG   - PPI BID  - Order Blood cultures  - Order sputum cultures  - Order UA  - Start Vanc + Zosyn  - Start sliding scale insulin  - Holding DVT prophylaxis  - Trend lactic acid Q4h      Neurological:  # Acute pain   - Monitor neurological status. Delirium preventions and precautions  - Lorazepam 0.5 mg Q6H PRN f  - Pain: Scheduled Fentanyl infusion, Tylenol 650 mg Q4H PRN, Dilaudid 0.2-0.4 mg Q2h PRN   - Sedation plan: propofol gtt, RASS goal 0 to -1    # AUD drinking about 6 pack a day for many years  # possible EtOH withdrawal seizures (on keppra intermittently).  # marijuana use   # tobacco use (chew)  - CTM      Pulmonary:   # Post operative ventilatory support  # Acute hypoxic respiratory support  - VENT: FiO2 (%): 50 %, Resp: 20, Vent Mode: VC/AC, Resp Rate (Set): 20 breaths/min, Tidal Volume (Set, mL): 500 mL, PEEP (cm H2O): 5 cmH2O, Resp Rate (Set): 20 breaths/min, Tidal Volume (Set, mL): 500 mL, PEEP (cm H2O): 5 cmH2O    Continue full vent support. PST when meets criteria.   Ventilatory bundle. HOB elevation   - Supplemental oxygen to keep saturation above 92%.  - Incentive spirometer every 15-30 minutes when awake.    # New PE, RLL, segmental, with possible infarct   - holding heparin gtt due to significant derangement of coags    Cardiovascular:    # Shock-hemorrhagic  # Shock-distributive   - Monitor hemodynamic status.   - Vasopressors: titrate Levophed and Vasopressin for MAP goal >65  - Hydralazine 10 mg Q4H PRN for systolic BP >180, diastolic >105    # HTN  - PTA lisinopril 40mg Daily      Gastroenterology/Nutrition:  # Protein calorie deficit malnutrition   - NPO except for meds  - NG tube on LIS  - IV PPI    # Umbilical hernia with poorly differentiated adenocarcinoma of unknown origin  # Peritoneal carcinomatosis  # Distal pancreatic mass, s/p EUS FNA on 6/6/25 with poorly differentiated carcinoma  - Surgical oncology consult 6/8  - we would not have any plans for surgical intervention given the fact that he has peritoneal disease with ascites. I discussed that treatment should be focused on palliation of symptoms as they arise. Currently he is passing gas and liquid stool and therefore I would probably wait another day or 2 before any further investigations assuming that he continues to be less distended over the course of the next 48 to 72 hours.     # Large bowel obstruction secondary to mass in the sigmoid colon  #c/f Partial SBO (imaging 6/7/25)   - 6/5 s/p colonoscopy with stent placement  - Bx without malignancy, although unclear if adequately sampled  - The advanced endoscopy team can be re-consulted if a venting G-tube becomes necessary with goals of going home on hospice although currently patient is critically ill and would recommend ongoing GOC discussion/   - Appreciate excellent supportive care per ICU team    # Hemopneumoperitoneum  # Perforated bowel  CT AP from 6/10 showing new hemopneumoperitoneum  - EGS consult 6/10:   - No surgical intervention is  recommended at this time. Recommendations were communicated to the patients daughter and son by phone  - Recommend ongoing goals of care discussion, planned care conference today    # Elevated LFTs   # hepatitis C  # compensated cirrhosis  - Increased AST and ALT likely in the setting of profound episode of hypotension  - CTM    Fluids/Electrolytes/Renal:   # Hypokalemia  - 1L IV NaCl bolus 6/10, LR mIVF 125 mL/hr  - Electrolyte repletion protocol    # Acute kidney injury  # Urinary retention  Cr. 1.34, down from 1.84  - Urine output is poor . Will continue to monitor intake and output.  - difficult king replacement  - Urology consult: 6/10 s/p king placement      Endocrine:  # Stress hyperglycemia   # Prediabetes   Hbg A1c 6.0   Recent Labs   Lab 06/10/25  1223 06/10/25  1218 06/10/25  0752 06/10/25  0750 06/10/25  0656 06/10/25  0336   * 129* 110* 106* 104* 131*     - No management indication. Goal to keep BG< 180   - S/p Decadron 4 mg x1 dose 6/9  - Continue to monitor    ID:  # C/f septic shock  WBC 11.0, lactate 4.1, previous 10, down trending  - Unclear etiology of profound hypotension 6/10, possible sepsis  - Empirically started on Vancomycin and Zosyn 6/10  - UA, sputum cultures pending  - Follow blood cultures    Positive cultures:  - 6/10 Peripheral bcx2: pending      Heme:     # Acute blood loss anemia   # Anemia of critical illness   - Hemoglobin 9.4 (14.2 pre operatively), INR >10  - s/p 2 units pRBCs 6/9 for bleeding from RLE fasciotomy site and hypotension  - Transfuse if hgb <7.0 or signs/symptoms of hypoperfusion. Monitor and trend.     MSK:  # Weakness and deconditioning of critical illness  - Physical and occupational therapy consult     # Acute R limb ischemia s/p emergency vascular surgery on 6/9/25   - s/p Right iliofemoral thrombectomy from groin incision, right popliteal and tibial thrombectomy from calf incision, posterior tibial and dorsalis pedis thrombectomies from separate pedal  incisions, 4 compartment fasciotomy, right common iliac artery angioplasty and stenting.   - Vascular surgery Recommendations:    -    -     General Cares/Prophylaxis:    DVT Prophylaxis: Pneumatic Compression Devices  GI Prophylaxis: PPI  Restraints: Restraints for medical healing needed: NO    Lines/ tubes/ drains:  - ETT  - CVC Triple lumen left femoral  - Arterial line femoral  - king  - PIV x 2  - NG tube    Disposition:  - Surgical ICU    Patient seen and discussed with staff.    Aaron Arce, MS4  [employer]    Resident Attestation   I, Clara Padron DO, was present with the medical student who participated in the service and in the documentation of the note. I have verified the history and personally performed the physical exam and medical decision making. I agree with the assessment and plan of care as documented in the note and have edited where appropriate.      JONAH Padron DO  General Surgery PGY-1      ====================================  History of present illness:  Gamaliel Johnson is a 69 year old male w/ pmh pertinent for hepatitis C, HTN, tobacco use (chew), EtOH use disorder, possible EtOH withdrawal seizures (on keppra intermittently). He was admitted on 6/4/2025 for management of abdominal pain and vomiting and was found to have large bowel obstruction and new PE. Patient had undergone recent umbilical hernia repair on 5/30/25 at which time was seen to have nodularities concerning for malignancy and from which biopsy returned positive for adenocarcinoma. On 6/7 he seemed to develop a small bowel obstruction as well. On 6/9 he developed critical ischemia to RLE and was taken urgently to OR by vascular surgery for surgical intervention. Following surgery on 6/9 he required 2 U pRBCS due to bleeding from RLE fasciotomy site. On 6/10 he developed profound hypotension and required emergent intubation for airway protection with admission to the SICU.       OBJECTIVE:      Temp:  [94.5  F (34.7  C)-98  F (36.7  C)] 97.8  F (36.6  C)  Pulse:  [] 92  Resp:  [14-36] 20  BP: ()/(44-92) 60/44  MAP:  [59 mmHg-108 mmHg] 64 mmHg  Arterial Line BP: ()/(49-78) 98/52  FiO2 (%):  [70 %] 70 %  SpO2:  [90 %-100 %] 100 %  FiO2 (%): 70 %, Resp: 20, Vent Mode: VC/AC, Resp Rate (Set): 18 breaths/min, Tidal Volume (Set, mL): 480 mL, PEEP (cm H2O): 5 cmH2O, Resp Rate (Set): 18 breaths/min, Tidal Volume (Set, mL): 480 mL, PEEP (cm H2O): 5 cmH2O    I/O last 3 completed shifts:  In: 7526 [I.V.:6176; IV Piggyback:50]  Out: 1800 [Urine:525; Emesis/NG output:1025; Blood:250]    General/Neuro: Intubated and sedated, ET tube in place, does not rouse to voice, does not follow commands  CV: RRR, normal S1 and S2, no murmurs or gallops  Pulm: Mechanically ventilated, symmetric chest rise, normal breath sounds  Abd: distended, no rebound, no guarding  Extremities: no edema, right lower extremity is wrapped in a clean, dry ace bandage  Skin: warm and well-perfused.     LABS:   Arterial Blood Gases   Recent Labs   Lab 06/10/25  0853 06/10/25  0811 06/09/25  1612 06/09/25  1512   PH 7.29* 7.10* 7.38 7.36   PCO2 34* 54* 37 37   PO2 72* 181* 110* 85   HCO3 16* 17* 22 21     Complete Blood Count   Recent Labs   Lab 06/10/25  0752 06/10/25  0331 06/10/25  0106 06/09/25  2150 06/09/25  1350 06/09/25  1033   WBC 11.0 9.8  --  8.1  --  5.4   HGB 9.4* 10.9*  10.9* 10.8* 8.9*   < > 14.2    319  --  301  --  397    < > = values in this interval not displayed.     Basic Metabolic Panel  Recent Labs   Lab 06/10/25  0752 06/10/25  0750 06/10/25  0656 06/10/25  0336 06/10/25  0331 06/09/25  1612 06/09/25  1512 06/09/25  1350 06/09/25  0800 06/08/25  1146 06/08/25  0518     --   --   --  138 137 138   < > 138  --  138   POTASSIUM 4.8  --   --   --  4.5 4.1 4.3   < > 3.1*   < > 2.8*   CHLORIDE 105  --   --   --  104  --   --   --  103  --  106   CO2 14*  --   --   --  17*  --   --   --  15*  --  19*    BUN 20.7  --   --   --  20.0  --   --   --  10.5  --  8.8   CR 1.84*  --   --   --  1.47*  --   --   --  0.66*  --  0.57*   * 106* 104* 131* 145* 91 93   < > 78   < > 99    < > = values in this interval not displayed.     Liver Function Tests  Recent Labs   Lab 06/10/25  0854 06/10/25  0752 06/10/25  0331 06/09/25 0933 06/09/25  0800 06/07/25  0539 06/05/25  0624 06/04/25  0643   AST  --  153* 50*  --  25 21   < > 21   ALT  --  67 21  --  19 10   < > 18   ALKPHOS  --  44 48  --  67 66   < > 79   BILITOTAL  --  1.8* 2.2*  --  1.0 0.4   < > 0.5   ALBUMIN  --  2.1* 2.6*  --  2.8* 2.6*   < > 4.0   INR >10.00*  --   --  3.62*  --   --   --  1.36*    < > = values in this interval not displayed.     Pancreatic Enzymes  Recent Labs   Lab 06/04/25  0643   LIPASE 41     Coagulation Profile  Recent Labs   Lab 06/10/25  0854 06/10/25  0331 06/09/25 2032 06/09/25  0933 06/04/25  0643   INR >10.00*  --   --  3.62* 1.36*   * >240* >240* 36  --          IMAGING:   Recent Results (from the past 24 hours)   XR Surgery HENRI G/T 5 Min Fluoro w Stills    Narrative    This exam was marked as non-reportable because it will not be read by a   radiologist or a [facility] non-radiologist provider.         XR Chest Port 1 View    Narrative    Portable chest    INDICATION: Increased oxygen needs    COMPARISON: CT chest 6/4/2025. Most recent plain films on PACS  portable exam 7/5/2020.    Low inspiratory volumes and portable technique. NG/OG tube side hole  and tip in the air distended stomach. Mild vascular crowding related  to low inspiratory volume. Heart size normal. Degenerative spurring  throughout the thoracic spine.      Impression    IMPRESSION: Benign vascular crowding related to low inspiratory volume  from portable technique. Gaseous distention of the stomach with NG/OG  tube tip and sidehole projecting within the stomach.    JUANY REDD MD         SYSTEM ID:  [id number]   XR Chest Port 1 View    Narrative     Portable chest 6/10/2025 at 0904    INDICATION: Check endotracheal tube position    COMPARISON: Earlier today 0721 hours    Low inspiratory volumes presumably from portable technique. Heart size  normal. Endotracheal tube tip is approximately 2.5 cm above the  eleanor. NG/OG tube beyond the inferior margin of the image. Streaky  densities in the lungs are similar which may indicate mild  edema/atelectasis. No ectopic air. Overall, no significant change in  the appearance of the lungs with the interval change being that of  endotracheal intubation.      Impression    IMPRESSION: Interval endotracheal intubation with grossly appropriate  position of the tubing. Minimal edema/atelectasis in the lungs.    JUANY REDD MD         SYSTEM ID:  [id number]

## 2025-06-10 NOTE — PROVIDER NOTIFICATION
Vascular surgery notified of DP and PT pulses difficult to find via doppler. Provider related difficulty to course of disease progression. Plan to continue to monitor per POC and keep RLE warm with blankets.

## 2025-06-10 NOTE — CODE/RAPID RESPONSE
"Rapid Response Team Note    Assessment   A rapid response was called on Gamaliel Johnson due to hypotension and dyspnea.     Admitted with bowel obstruction, new stage IV pancreatic adenocarcinoma, acute critical limb ischemia, s/p fasciotomy yesterday.   RRT for profound hypotension with SBP 50-60. He complains of shortness of breath.     Plan   -  2 units PRBC  - EKG  - Norepi  - stop heparin gtt  - CBC, CMP, VBG, lactic acid, INR, 10a  - 1000 ml NS  - Chest x-ray, abdominal x-ray  - Vascular access for blood/IV  - CT chest/abd/pelvis wo    Dr. Salazar at bedside    Patient discussed with ICU attending/fellow. Patient accepted to SICU    Family notified by Dr. Salazar, son is one his way in      NikosmalMALDONADO Corrales CNP  Rapid Response Team [facility]  Securely message with [email or web address]     Medical Decision Making     35 MINUTES SPENT BY ME on the date of service doing chart review, history, exam, documentation & further activities per the note.      Gamaliel is critically ill with acute hypoxic respiratory failure and undifferentiated shock    Total Critical Care time spent by me, excluding procedures, was 35 minutes  Hospital Course       Physical Exam   Vital Signs: Temp: 97.5  F (36.4  C) Temp src: Axillary BP: 93/67 Pulse: 107   Resp: (!) 36 SpO2: 94 % O2 Device: None (Room air) Oxygen Delivery: 1 LPM  Weight: 190 lbs 4.11 oz      Exam:   Respiratory: dyspneic at rest  Cardiovascular: tachy, regular  GI- distended abdomen    Significant Results and Procedures   Lab Results   Component Value Date    WBC 9.8 06/10/2025    WBC 7.9 04/08/2021     Lab Results   Component Value Date    RBC 3.72 06/10/2025    RBC 4.55 04/08/2021     Lab Results   Component Value Date    HGB 10.9 06/10/2025    HGB 10.9 06/10/2025    HGB 15.2 04/08/2021     Lab Results   Component Value Date    HCT 32.5 06/10/2025    HCT 42.4 04/08/2021     No components found for: \"MCT\"  Lab Results   Component Value Date    MCV 87 06/10/2025    MCV 87 " 06/10/2025    MCV 93 04/08/2021     Lab Results   Component Value Date    MCH 29.3 06/10/2025    MCH 33.4 04/08/2021     Lab Results   Component Value Date    MCHC 33.5 06/10/2025    MCHC 35.8 04/08/2021     Lab Results   Component Value Date    RDW 14.6 06/10/2025    RDW 12.8 04/08/2021     Lab Results   Component Value Date     06/10/2025     04/08/2021

## 2025-06-10 NOTE — PROGRESS NOTES
CLINICAL NUTRITION SERVICES - ASSESSMENT NOTE    RECOMMENDATIONS FOR MDs/PROVIDERS TO ORDER:  If TPN becomes nutrition POC, please place consult for:   Pharmacy/Nutrition to start & manage TPN    Registered Dietitian Interventions:  Met with family in room to offer option of TPN if within pt's GOC. They are appreciative of information and are considering in their GOC discussions.    Future/Additional Recommendations:    TPN recs if needed:  Dosing weight:  79 kg  Access: CVC triple lumen femoral is current central access    Initial parameters (per day)  Volume:  Minimal or per PharmD discretion pending fluid status  Dextrose: 150 g (GIR 1.3)  AA: 150 g  Lipids: 250 mL 20% daily    Dextrose titration:   Monitor lytes and if within acceptable parameters (Mg++ > or = 1.5, K+ is > or = 3, and PO4 > or = 1.9), increase dextrose by 20 g/day to goal of 170 g dextrose.    Additives: 10 mL Infuvite + 1 mL MTE-4 daily [100 mg thiamine daily x 5 days - can be added to TPN bag or in separate infusion]    **DELETE BELOW PORTION FOR TPN CHANGE ORDER    Goal PN provides 170 g dextrose, 150 g AA, and 250 mL 20% lipids daily for total provision of 1678 Kcals (21 Kcals/kg), 1.9 g/kg protein, GIR 1.5 mg/kg/minute, and 30% fat kcals on average daily.      REASON FOR ASSESSMENT  Interdisciplinary rounds - possible TPN pending GOC    INFORMATION OBTAINED  Assessed patient in room.    NUTRITION HISTORY  Per Palliative note on 6/8:   We discussed general treatment options (full/restorative, selective/conservatives, and comfort only/hospice). We then discussed how these specifically apply to metastatic pancreatic cancer.  Based on this discussion, he has decided to proceed with more of a comfort focused plan of care.  He is interested in procedures at this point that could provide symptomatic benefit, such as a paracentesis as well as a venting G-tube.  Ultimately he is interested in discharging home with hospice.    No food allergies  "noted.    Per visit with pt and pt's family at bedside: Writer briefly visited to discuss the option of TPN if within patient's GOC. Family is aware that TPN is not going to provide benefit for recovery from the standpoint of his cancer diagnosis. They share that they think he wouldn't be interested in TPN as he's previously expressed that he desires to take nutrition by mouth as able, and they're hopeful to work toward getting him to the point of trying PO again. Family at bedside are appreciative of visit and are currently in the process of discussing GOC after his current decompensation.    CURRENT NUTRITION ORDERS  Diet: NPO > previously Regular vs CLD vs FLD earlier this admit.     CURRENT INTAKE/TOLERANCE  Minimal intake this admit. No meal intake charted in RN flowsheets this admit.    LABS  Nutrition-relevant labs: Reviewed; K+ 3.0 (L), elevated LFTs    MEDICATIONS  Nutrition-relevant medications: Reviewed; norepi gtt    ANTHROPOMETRICS  Height: 170.2 cm (5' 7.008\")  Admission Weight: 82.7 kg (182 lb 6.4 oz) (06/08/25 0830)   Most Recent Weight: 86.3 kg (190 lb 4.1 oz) (06/10/25 0614)  IBW: 67.3 kg   BMI: Body mass index is 29.79 kg/m .   Weight History: Recent wt fluctuation   Wt Readings from Last 20 Encounters:   06/10/25 86.3 kg (190 lb 4.1 oz)   06/04/25 79.4 kg (175 lb)   05/30/25 75.9 kg (167 lb 6.4 oz)   02/14/22 77.1 kg (170 lb)   10/26/21 80.3 kg (177 lb)   04/09/21 81.2 kg (179 lb)   12/14/20 80.3 kg (177 lb)   11/16/20 80.3 kg (177 lb)   11/02/20 82.6 kg (182 lb)   09/11/20 77.1 kg (170 lb)   04/22/20 82.1 kg (181 lb)   03/16/20 81.2 kg (179 lb)   02/03/20 81.2 kg (179 lb)   11/29/18 83.9 kg (185 lb)   10/30/17 77.1 kg (170 lb)   07/27/16 75.8 kg (167 lb)   03/16/15 77.1 kg (170 lb)   02/19/15 77.1 kg (170 lb)   01/09/15 78 kg (172 lb)   12/29/14 77.1 kg (170 lb)     Per Care Everywhere:  82.2 kg (181 lb 3.5 oz) 04/23/2025 11:32 AM CDT     84.7 kg (186 lb 11.7 oz) 12/06/2024 3:52 PM CST     83.9 " kg (184 lb 15.5 oz) 10/21/2024 10:50 AM CDT     Wts this Admit  Date/Time Weight Weight Method   06/10/25 0614 86.3 kg (190 lb 4.1 oz) Bed scale   06/09/25 0759 81.1 kg (178 lb 12.7 oz) Bed scale   06/08/25 0830 82.7 kg (182 lb 6.4 oz) Standing scale     Dosing Weight: 79 kg, based on recent suspect dry wt of 79.4 kg on 6/4 (118% IBW)    ASSESSED NUTRITION NEEDS  Estimated Energy Needs: 6306-2832 kcals/day (20 - 25 kcals/kg) if PN  Justification: Maintenance  Estimated Protein Needs: 119-158 grams protein/day (1.5 - 2 grams of pro/kg)  Justification: Hypercatabolism with critical illness  Estimated Fluid Needs: Per provider pending fluid status    SYSTEM AND PHYSICAL FINDINGS    Large bore NGT to LIS  ETT    GI symptoms: Reviewed; last BM 6/5, mass-related bowel obstruction  Skin/wounds: Reviewed; surgical incisions to R groin, R leg, R foot and medial abdomen    MALNUTRITION  % Intake: </= 50% for >/= 5 days (severe)  % Weight Loss: Unable to assess  - wt gain this admit, suspect fluid-related  Subcutaneous Fat Loss: Buccal: Mild per visual - deferred formal NFPE d/t ongoing GOC discussion, kept visit brief  Muscle Loss: Unable to assess - deferred formal NFPE d/t ongoing GOC discussion, kept visit brief  Fluid Accumulation/Edema: Moderate to severe, 2-4+  Malnutrition Diagnosis: At minimum Moderate malnutrition in the context of acute illness or injury  Malnutrition Present on Admission: No    NUTRITION DIAGNOSIS  Altered gastrointestinal (GI) function related to mass-related bowel obstruction as evidenced by NGT placement for decompression and inability to tolerate adequate PO prior to current intubated status.    INTERVENTIONS  TPN recs  Collaboration by nutrition professional with other providers  Nutrition education content    GOALS  TPN within 1-4 days pending GOC     MONITORING/EVALUATION  Progress toward goals will be monitored and evaluated per policy.'    Leslie Burt RD, LD  Available on Vocera - can  search by name or unit Dietitian  **Clinical Nutrition is no longer available via pager

## 2025-06-10 NOTE — PROVIDER NOTIFICATION
06/10/25 0700   Call Information   Date of Call 06/10/25   Time of Call 0652   Name of person requesting the team Juvenal VALENCIA   Title of person requesting team RN   RRT Arrival time 0655   Time RRT ended 0735   Reason for call   Type of RRT Adult   Primary reason for call Cardiovascular;Additional help needed   Cardiovascular SBP less than 90   Was patient transferred from the ED, ICU, or PACU within last 24 hours prior to RRT call? Yes   SBAR   Situation Pt w/ profound hypotension with MAPs in 30-40's. Abdomen w/ new distention/tenderness   Background 69 year old male admitted on 6/4/2025 for management of abdominal pain and vomiting found to have large bowel obstruction, likely carcinomatosis, and new PE. Patient had undergone recent umbilical hernia repair on 5/30/25 at which time was seen to have nodularities concerning for malignancy and from which biopsy returned positive for adenocarcinoma. On 6/7 he seemed to develop a small bowel obstruction as well. On 6/9 he developed critical ischemia to RLE and was taken urgently to OR by vascular surgery. He otherwise has a history pertinent for hepatitis C with compensated cirrhosis, HTN, tobacco use, past alcohol use.   Notable History/Conditions Cancer;Recent surgery   Assessment Pt lethargic/withdrawn/pale but arrousable. Vitals significant for hypotension w/ MAPs in 30-40's. Pt more tachypneic w/ increased WOB and O2 needs. R leg bandaged but bleeding through gauze. Abdomen firm, distended, tender. During workup peripheral norepi infiltrated in the the RUE and patient lost all IV access and pressors and blood had to be paused until IV access could be established.   Interventions CXR;ECG;Fluid bolus;IV fluids;Labs;O2 per N/C or mask  (2L crystalloid infusing until 2u of PRBCs can be started. Peripheral levo started.)   Patient Outcome   Patient Outcome Transferred to  (Patient was brought to SICU emergently for CVC placement for pressors and blood administration,  intubation.)   RRT Team   Attending/Primary/Covering Physician Frank 1 (MD Marie)   Date Attending Physician notified 06/10/25   Time Attending Physician notified 0652   Physician(s) MALDONADO Kelsey-NP   Lead RN Dino Carlin   Other staff Cheryl Salazar   Post RRT Intervention Assessment   Post RRT Assessment Transferred to ICU   Date Follow Up Done 06/10/25   Time Follow Up Done 0730   Comments Transfered to 4E SICU for CVC and arterial line placement, intubation, and stabilization before CT Chest, Abd, Pelvis

## 2025-06-10 NOTE — PROGRESS NOTES
Events from earlier this morning are noted.  Patient developed profound hypotension and required intubation to protect his airway.  Patient has been transferred to the intensive care unit.  The groin incision is clean, dry and intact with no hematoma.  The Dressing is also clean dry and intact.  There is no evidence of bleeding from the surgical sites from yesterday to account for his hypotension.  The patient's calf muscles were all viable at the end of the case.  I suspect the hypotension is related to some other aspect of his metastatic cancer and ongoing need for therapeutic anticoagulation for thromboembolic events.

## 2025-06-10 NOTE — CODE/RAPID RESPONSE
06/10/25 0700   Call Information   Date of Call 06/10/25   Time of Call 0652   Name of person requesting the team Juvenal B   Title of person requesting team RN   RRT Arrival time 0655   Time RRT ended 0735   Reason for call   Type of RRT Adult   Primary reason for call Cardiovascular;Additional help needed   Cardiovascular SBP less than 90   Was patient transferred from the ED, ICU, or PACU within last 24 hours prior to RRT call? Yes   SBAR   Situation Pt w/ profound hypotension with MAPs in 30-40's. Abdomen w/ new distention/tenderness   Background 69 year old male admitted on 6/4/2025 for management of abdominal pain and vomiting found to have large bowel obstruction, likely carcinomatosis, and new PE. Patient had undergone recent umbilical hernia repair on 5/30/25 at which time was seen to have nodularities concerning for malignancy and from which biopsy returned positive for adenocarcinoma. On 6/7 he seemed to develop a small bowel obstruction as well. On 6/9 he developed critical ischemia to RLE and was taken urgently to OR by vascular surgery. He otherwise has a history pertinent for hepatitis C with compensated cirrhosis, HTN, tobacco use, past alcohol use.   Notable History/Conditions Cancer;Recent surgery   Assessment Pt lethargic/withdrawn/pale but arrousable. Vitals significant for hypotension w/ MAPs in 30-40's. Pt more tachypneic w/ increased WOB and O2 needs. R leg bandaged but bleeding through gauze. Abdomen firm, distended, tender.   Interventions CXR;ECG;Fluid bolus;IV fluids;Labs;O2 per N/C or mask  (2L crystalloid infusing until 2u of PRBCs can be started. Peripheral levo started.)   Patient Outcome   Patient Outcome Transferred to  (4E)   RRT Team   Attending/Primary/Covering Physician Frank 1 (MD Marie)   Date Attending Physician notified 06/10/25   Time Attending Physician notified 0652   Physician(s) Yasmine Cavanaugh, MALDONADO-NP   Lead RN Dino Carlin   Other staff Cheryl  [place]

## 2025-06-10 NOTE — CONSULTS
"Palliative Care Initial Social Work Note  Location: [facility]    Patient Info:  Per EMR, Gamaliel Johnson is a 69 year old male with  HTN, hepatitis C c/b compensated cirrhosis, tobacco use disorder, and recently diagnosed stage IV pancreatic adenocarcinoma with known metastases to peritoneum. Now with recent large bowel obstruction likely 2/2 carcinomatosis, new PE, SBO (6/7/25) who developed RLE critical limb ischemia (S/P thrombectomy & fasciotomy to RLE 6/9/25). Postoperatively he had bleeding from the site. This morning he was transferred to ICU for hypotension and dyspnea, requiring pressors and intubation.  The patient is unable to contribute to HPI.      Brief summary of visit: Care Conference    Medical update provided and family provided opportunity to ask questions, express worries/concerns.  Prior to care conference, family had received results of pt's CT scan which noted bowel perforation. Family visibly saddened and frustrated after learning these results.    Option of transitioning to comfort discussed with family. SICU team reviewed their hopefulness that given pt's low vent settings and not being dependent on pressors, pt may have possible days following transition to comfort care should he/family make that decision. Family continues to identify pt's wish to get back home with hospice.    SICU team made recommendation of making patient DNR in light of clinical status. Family appeared to understand recommendation while also noting, \"We don't need to make that decision right now.\" Team validated family can take time to consider all information gathered today and reviewed that should decisions be made with changes to code status or plan of care, family can advise bedside nurse who can reach teams.    Dtr Elsa identified no other questions. Son Juice validated his sister in \"doing a good job\" while noting \"I'm keeping my mouth shut.\"    Interventions used today: active and reflective listening, " "validation, emotional support, trust and rapport building      Date of Admission: 6/4/2025    Reason for consult: Goals of care  Decisional support  Patient and family support    Sources of information: Family member greg Bose dtr Elsa  Staff SICU  Other chart review    Recommendations & Plan:  PCSW will remain available in the event family identifies children's grief/support needs       Symptoms & Concerns Addressed Today:  Emotional support and validation provided during care conference today  Family  Grief & loss family demonstrating grief and verbalizing shock around pt's rapid decline noting, \"he was just mowing his lawn a week ago.\"    Strengths Identified:    Family support    Relationships & Support:  Aspects of relationships and support assessed today:  Identified family members: greg Bose dtr Elsa  Professional supports: medical teams  Family coping: dtr tearful during care conference today, greg Bose very quiet, reserved, appeared angry  Bereavement Risk concerns: continue to assess; pt has four young grandchildren, ages 11 mos- 11 years    Coping, Mental Health & Adjustment to Illness:   Adjustment to Illness/Hospitalization    Goals, Decision Making & Advance Care Planning:   Prognosis, Goals, and/or Advance Care Planning were assessed today: Yes  If yes, brief summary of discussion: Son and dtr had received CT results prior to care conference; SICU attending reviewed poor prognosis related to results which show bowel perforation  Preferred language: English  Patient's decision making preferences: not assessed  I have concerns about the patient/family's health literacy today: No  Patient has a completed Health Care Directive: No.   Code status per chart review: Full Code      Clinical Social Work Interventions:   Assessment of palliative specific issues    Introduction of Palliative clinical social work interventions   Adjustment to illness counseling  Attended/participated in care " conference  Facilitation of processing of thoughts/feelings  Goals of care discussion/facilitation      NORMAN Salguero, [facility]  [facility], Palliative Care  Securely message with the [place] Web Console (learn more here) or  Text page via [facility] Paging/Directory

## 2025-06-10 NOTE — CONSULTS
[facility]    Consult Note - General Surgery Service  Date of Admission:  6/4/2025  Consult Requested by: Dr. Manning   Reason for Consult: Hemopneumoperitoneum     History of Present Illness   Gamaliel Johnson is a 69 year old male with a recent diagnosis of metastatic pancreatic adenocarcinoma, umbilical hernia surgery repair 05/30 who was admitted 06/04/25 with worsening abdominal pain. He was found to have a large bowel obstruction S/P colonoscopy with colonic stent insertion 06/05/2025. On 06/09 he developed critical leg ischemia to the right lower extremity S/P right iliofemoral thrombectomy and RLE fasciotomy. Last evening he developed worsening abdominal pain, hypotension was transferred to the ICU. CTA abdomen concerning for increased small volume pneumoperitoneum along the colonic stent and new hemoperitoneum. Moderate volume ascites.  General surgery consulted.    Assessment & Plan: Surgery   Intubated, sedated on propofol, norepi, vaso  NG in place with dark brown colored output  Abdomen is taut    - No surgical intervention is recommended at this time. Recommendations were communicated to the patients daughter and son by phone  - Recommend ongoing goals of care discussion, planned care conference today     Drains: None     Code Status: Full Code      Clinically Significant Risk Factors     # Hypokalemia: Lowest K = 3.1 mmol/L in last 2 days, will replace as needed    # Hypocalcemia: Lowest iCa = 4 mg/dL in last 2 days, will monitor and replace as appropriate   # Hypomagnesemia: Lowest Mg = 1.6 mg/dL in last 2 days, will replace as needed  # Anion Gap Metabolic Acidosis: Highest Anion Gap = 23 mmol/L in last 2 days, will monitor and treat as appropriate  # Hypoalbuminemia: Lowest albumin = 2.1 g/dL at 6/10/2025  7:52 AM, will monitor as appropriate    # Coagulation Defect: INR = >10.00 (Ref range: 0.85 - 1.15) and/or PTT = 93 Seconds (Ref range: 22 - 38  "Seconds), will monitor for bleeding   # Acute Kidney Injury, unspecified: based on a >150% or 0.3 mg/dL increase in last creatinine compared to past 90 day average, will monitor renal function  # Hypertension: Noted on problem list     # Acute Hypercapnic Respiratory Failure: based on arterial blood gas results.  Continue supplemental oxygen and ventilatory support as indicated.         # Overweight: Estimated body mass index is 29.79 kg/m  as calculated from the following:    Height as of this encounter: 1.702 m (5' 7.01\").    Weight as of this encounter: 86.3 kg (190 lb 4.1 oz).        # Financial/Environmental Concerns: none       The patient's care was discussed with the Attending Physician, Dr. Patel.    MALDONADO [facility]  Non-urgent messages: Securely message with [employer] (more info)  Text page via [facility] Paging/Directory     ______________________________________________________________________    Chief Complaint   Abdominal pain   Past Medical History    Past Medical History:   Diagnosis Date    Compensated HCV cirrhosis (H)     Hepatitis C        Past Surgical History   Past Surgical History:   Procedure Laterality Date    ANGIOGRAM Right 06/09/2025    Procedure: Angiogram Right Leg;  Surgeon: Bruno Zamudio MD;  Location: [facility] OR    COLONOSCOPY  01/01/1986    COLONOSCOPY N/A 02/19/2015    Procedure: COLONOSCOPY;  Surgeon: Efren Hernandez MD;  Location: [facility] OR    COLONOSCOPY N/A 06/05/2025    Procedure: COLONOSCOPY, WITH COLONIC STENT INSERTION;  Surgeon: Guru Katelynn Barber MD;  Location: [facility] OR    ESOPHAGOSCOPY, GASTROSCOPY, DUODENOSCOPY (EGD), COMBINED N/A 06/05/2025    Procedure: ESOPHAGOGASTRODUODENOSCOPY, WITH FINE NEEDLE ASPIRATION BIOPSY, WITH ENDOSCOPIC ULTRASOUND GUIDANCE;  Surgeon: Guru Katelynn Barber MD;  Location: [facility] OR    FASCIOTOMY LOWER EXTREMITY Right 06/09/2025    Procedure: " Fasciotomy lower extremity Right;  Surgeon: Bruno Zamudio MD;  Location: [facility] OR    HERNIORRHAPHY UMBILICAL N/A 05/30/2025    Procedure: Open umbilical hernia repair, umbilical mass biopsy and omental biopsy;  Surgeon: Rainer Brenner MD;  Location: [facility] OR    THROMBECTOMY LOWER EXTREMITY Right 06/09/2025    Procedure: Right iliofemoral Thrombectomy, popliteal thrombectomy through separate incision, dorsalis pedis and posterior tibial thrombectomy through separate incisions;  Surgeon: Bruno Zamudio MD;  Location: [facility] OR    VASECTOMY Bilateral        Prior to Admission Medications   Current Facility-Administered Medications   Medication Dose Route Frequency Provider Last Rate Last Admin    acetaminophen (TYLENOL) tablet 650 mg  650 mg Oral Q4H PRN Sanchez Clifton PA-C   650 mg at 06/09/25 2054    Or    acetaminophen (TYLENOL) Suppository 650 mg  650 mg Rectal Q4H PRN Sanchez Clifton PA-C        calcium carbonate (TUMS) chewable tablet 1,000 mg  1,000 mg Oral 4x Daily PRN Sanchez Clifton PA-C        chlorhexidine (PERIDEX) 0.12 % solution 15 mL  15 mL Mouth/Throat Q12H Judy Hatch PA-C   15 mL at 06/10/25 1206    glucose gel 15-30 g  15-30 g Oral Q15 Min PRN Judy Hatch PA-C        Or    dextrose 50 % injection 25-50 mL  25-50 mL Intravenous Q15 Min PRN Judy Hatch PA-C        Or    glucagon injection 1 mg  1 mg Subcutaneous Q15 Min PRN Judy Hatch PA-C        EPINEPHrine (ADRENALIN) 16 mg in sodium chloride 0.9 % 250 mL infusion CENTRAL  0.01-0.3 mcg/kg/min (Dosing Weight) Intravenous Continuous Windy Russell MD        fentaNYL (SUBLIMAZE) infusion   mcg/hr Intravenous Continuous Brett Manning MD 1.5 mL/hr at 06/10/25 1300 75 mcg/hr at 06/10/25 1300    [Held by provider] heparin 25,000 units in 0.45% NaCl 250 mL ANTICOAGULANT infusion  0-5,000 Units/hr Intravenous Continuous Windy Russell MD   Stopped at 06/10/25 0655    hydrALAZINE (APRESOLINE) tablet 10  mg  10 mg Oral Q4H PRN Kerri Moreno PA-C   10 mg at 06/04/25 1856    Or    hydrALAZINE (APRESOLINE) injection 10 mg  10 mg Intravenous Q4H PRN Kerri Moreno PA-C        HYDROmorphone (DILAUDID) injection 0.2 mg  0.2 mg Intravenous Q2H PRN Mandy Hackett MD        Or    HYDROmorphone (DILAUDID) injection 0.4 mg  0.4 mg Intravenous Q2H PRN Mandy Hackett MD   0.4 mg at 06/10/25 0434    insulin aspart (NovoLOG) injection (RAPID ACTING)  1-12 Units Subcutaneous Q4H Judy Hatch PA-C        lactated ringers infusion   Intravenous Continuous Cyndy Salazar  mL/hr at 06/10/25 1300 Rate Verify at 06/10/25 1300    lidocaine (LMX4) cream   Topical Q1H PRN Sanchez Clifton PA-C        lidocaine 1 % 0.1-1 mL  0.1-1 mL Other Q1H PRN Sanchez Clifton PA-C        [Held by provider] lisinopril (ZESTRIL) tablet 40 mg  40 mg Oral Daily Gregroia Mccloud PA-C   40 mg at 06/08/25 0840    LORazepam (ATIVAN) 2 MG/ML (HIGH CONC) oral solution 0.5 mg  0.5 mg Oral Q6H PRN Jennyfer Leung DO   0.5 mg at 06/08/25 2216    propofol (DIPRIVAN) infusion  5-75 mcg/kg/min (Dosing Weight) Intravenous Continuous Mandy Hackett MD 11.9 mL/hr at 06/10/25 1300 25 mcg/kg/min at 06/10/25 1300    And    propofol (DIPRIVAN) bolus from bag or syringe pump  10 mg Intravenous Q15 Min PRN Mandy Hackett MD        And    Medication Instruction   Does not apply Continuous PRN Mandy Hackett MD        naloxone (NARCAN) injection 0.2 mg  0.2 mg Intravenous Q2 Min PRN Swapna Leroy MD        Or    naloxone (NARCAN) injection 0.4 mg  0.4 mg Intravenous Q2 Min PRN Swapna Leroy MD        Or    naloxone (NARCAN) injection 0.2 mg  0.2 mg Intramuscular Q2 Min PRN Swapna Leroy MD        Or    naloxone (NARCAN) injection 0.4 mg  0.4 mg Intramuscular Q2 Min PRN Swapna Leroy MD        norepinephrine (LEVOPHED) 16 mg in  mL infusion MAX CONC CENTRAL LINE  0.01-0.6 mcg/kg/min (Dosing Weight)  Intravenous Continuous Eladio Reyes MD 6.7 mL/hr at 06/10/25 1300 0.09 mcg/kg/min at 06/10/25 1300    ondansetron (ZOFRAN ODT) ODT tab 4 mg  4 mg Oral Q6H PRN Sanchez Clifton PA-C        Or    ondansetron (ZOFRAN) injection 4 mg  4 mg Intravenous Q6H PRN Sanchez Clifton PA-C        oxyCODONE (ROXICODONE) tablet 5 mg  5 mg Oral Q4H PRN Sanchez Clifton PA-C   5 mg at 06/09/25 0857    oxyCODONE IR (ROXICODONE) half-tab 2.5 mg  2.5 mg Oral Q4H PRN Sanchez Clifton PA-C        [START ON 6/11/2025] pantoprazole (PROTONIX) 2 mg/mL suspension 40 mg  40 mg Per Feeding Tube QAM  Judy Hatch PA-C        Or    [START ON 6/11/2025] pantoprazole (PROTONIX) injection 40 mg  40 mg Intravenous QAM  Judy Hatch PA-C        Patient is already receiving anticoagulation with heparin, enoxaparin (LOVENOX), warfarin (COUMADIN)  or other anticoagulant medication   Does not apply Continuous PRN Sanchez Clifton PA-C        piperacillin-tazobactam (ZOSYN) 3.375 g vial to attach to  mL bag  3.375 g Intravenous Q6H Judy Hatch PA-C        polyethylene glycol (MIRALAX) Packet 17 g  17 g Oral Daily PRN Gregoria Mccloud PA-C        [Held by provider] polyethylene glycol (MIRALAX) Packet 17 g  17 g Oral Daily Sanchez Clifton PA-C   17 g at 06/04/25 1551    prochlorperazine (COMPAZINE) injection 5 mg  5 mg Intravenous Q6H PRN Sanchez Clifton PA-C        Or    prochlorperazine (COMPAZINE) tablet 5 mg  5 mg Oral Q6H PRN Sanchez Clifotn PA-C        [Held by provider] senna-docusate (SENOKOT-S/PERICOLACE) 8.6-50 MG per tablet 1 tablet  1 tablet Oral Daily Gregoria Mccloud PA-C        sennosides (SENOKOT) tablet 1-2 tablet  1-2 tablet Oral BID PRN Gregoria Mccloud PA-C        sodium chloride (PF) 0.9% PF flush 3 mL  3 mL Intracatheter Q8H [facility] Mandy Hackett MD        sodium chloride (PF) 0.9% PF flush 3 mL  3 mL Intracatheter q1 min prn Mandy Hackett MD        sodium chloride (PF) 0.9% PF flush 3 mL  3 mL Intracatheter Q8H [facility] [place],  [facility] [place]   3 mL at 06/07/25 0506    sodium chloride (PF) 0.9% PF flush 3 mL  3 mL Intracatheter q1 min prn Sanchez Clifton PA-C        vancomycin place grant - receiving intermittent dosing  1 each Intravenous See Admin Instructions Windy Russell MD        vasopressin 1 unit/mL MAX Conc (PITRESSIN) infusion  0.5-4 Units/hr Intravenous Continuous Windy Russell MD 2.4 mL/hr at 06/10/25 1300 2.4 Units/hr at 06/10/25 1300          Review of Systems    Review of systems not obtained due to patient factors - critical condition     Physical Exam   Vital Signs: Temp: 97.4  F (36.3  C) Temp src: Esophageal BP: (S) (!) 60/44 (RRT called) Pulse: 90   Resp: 20 SpO2: 100 % O2 Device: Mechanical Ventilator Oxygen Delivery: (S) 15 LPM  Weight: 190 lbs 4.11 oz  Intake/Output Summary (Last 24 hours) at 6/10/2025 1334  Last data filed at 6/10/2025 1300  Gross per 24 hour   Intake 7477.25 ml   Output 1625 ml   Net 5852.25 ml     Constitutional: resting comfortably in bed, sedated  ENT: ETT in place  Respiratory: vent assisted, non labored  Cardiovascular: tachy  GI: distended and tenderness noted diffusely, NG tube with dark brown output  Skin: normal skin color, texture, turgor    Data     I have personally reviewed the following data over the past 24 hrs:    11.0  \   9.4 (L)   / 302     142 105 20.7 /  123 (H)   4.8 14 (L) 1.84 (H) \     ALT: 67 AST: 153 (H) AP: 44 TBILI: 1.8 (H)   ALB: 2.1 (L) TOT PROTEIN: 4.1 (L) LIPASE: N/A     Trop: 97 (H) BNP: N/A     Procal: N/A CRP: N/A Lactic Acid: 4.1 (HH)       INR:  >10.00 (HH) PTT:  93 (H)   D-dimer:  N/A Fibrinogen:  325       Imaging results reviewed over the past 24 hrs:   Recent Results (from the past 24 hours)   XR Surgery HENRI G/T 5 Min Fluoro w Stills    Narrative    This exam was marked as non-reportable because it will not be read by a   radiologist or a [place] non-radiologist provider.         XR Chest Port 1 View    Narrative    Portable chest    INDICATION: Increased  oxygen needs    COMPARISON: CT chest 6/4/2025. Most recent plain films on PACS  portable exam 7/5/2020.    Low inspiratory volumes and portable technique. NG/OG tube side hole  and tip in the air distended stomach. Mild vascular crowding related  to low inspiratory volume. Heart size normal. Degenerative spurring  throughout the thoracic spine.      Impression    IMPRESSION: Benign vascular crowding related to low inspiratory volume  from portable technique. Gaseous distention of the stomach with NG/OG  tube tip and sidehole projecting within the stomach.    JUANY REDD MD         SYSTEM ID:  [id number]   XR Chest Port 1 View    Narrative    Portable chest 6/10/2025 at 0904    INDICATION: Check endotracheal tube position    COMPARISON: Earlier today 0721 hours    Low inspiratory volumes presumably from portable technique. Heart size  normal. Endotracheal tube tip is approximately 2.5 cm above the  eleanor. NG/OG tube beyond the inferior margin of the image. Streaky  densities in the lungs are similar which may indicate mild  edema/atelectasis. No ectopic air. Overall, no significant change in  the appearance of the lungs with the interval change being that of  endotracheal intubation.      Impression    IMPRESSION: Interval endotracheal intubation with grossly appropriate  position of the tubing. Minimal edema/atelectasis in the lungs.    JUANY REDD MD         SYSTEM ID:  [id number]   CTA GI Bleed   Result Value    Radiologist flags Hemopneumoperitoneum (Urgent)    Narrative    Exam: Computed tomographic angiography of the abdomen and pelvis  without and with contrast, including 3D reformations dated 6/10/2025  11:14 AM    Clinical information:  hypotension, dyspnea, worsening distended  abdomen    Technique: Helical scans through the abdomen and pelvis obtained  before the administration of intravenous contrast media and following  the injection of contrast media  in the arterial phase. Source  images  reviewed as well as 3D and multi-planar reconstructions.    Comparison study: CT of 6/7/2025, 6/4/2025    Findings:      VESSELS:     The abdominal aorta is normal in caliber.    The celiac axis, SMA and RISA are patent. The single renal arteries are  patent bilaterally. Right common iliac stent patent. Mixed calcific  atherosclerosis of the common femoral artery bifurcation bilaterally.  Subcutaneous emphysema overlying the right groin likely related to  recent access. Left groin approach central venous catheter tip  terminating in the proximal left common iliac vein and arterial  catheter tip terminating in the mid left external iliac artery.    The splenic vein, portal vein, SMV and renal veins are  patent.  The hepatic veins and IVC are patent.    ABDOMEN/PELVIS:  LOWER THORAX: Small left greater than right loculated pleural  effusions with adjacent compressive atelectasis. Heart size is normal.  No pericardial effusion.    LIVER: No focal hepatic mass.    BILIARY: Normal appearance of the gallbladder. No intra- or  extrahepatic biliary dilation. Vicarious excretion of previously  administered contrast within the gallbladder lumen.    PANCREAS: No focal pancreatic mass or ductal dilation.    SPLEEN: No splenic mass.    ADRENAL GLANDS: Within normal limits.    URINARY TRACT: Urinary catheter in place. No suspicious renal mass,  renal calculi, or hydronephrosis. No bladder wall thickening.    REPRODUCTIVE ORGANS: No suspicious pelvic mass.    STOMACH: Enteric tube in place. Within normal limits.    BOWEL/PERITONEUM/FLUID: There is new moderate volume hemoperitoneum  with layering mixed density material most pronounced in the left  paracolic gutter and pelvis without active source of arterial  extravasation identified. Small volume free air in the mid mid abdomen  with foci of air scattered throughout the mesentery. Source of  perforation may be in the proximal jejunum with few transmural foci of  air  (16/211) versus the lateral end of the colonic stent (16/365) .  Mild small bowel distention likely reactive to ongoing peritoneal  fluid. Appendix is normal.     LYMPH NODES: Scattered mesenteric prominent lymph nodes.    BONES/SOFT TISSUES: No aggressive osseous lesions.      Impression    Impression:   1. New moderate volume hemoperitoneum with regions of layering  heterogenous fluid both in the left paracolic cluster as well as the  mid pelvis suggestive of recent bleeding. No definitive feeding vessel  or focus of acute arterial extravasation.  2. Increased small volume pneumoperitoneum without definitive source  of perforation, though suspicion is in the lateral/proximal edge of  the colonic stent as well as possible few diverticula in the inflamed  proximal jejunum.  3. Small bilateral loculated pleural effusions with adjacent  compressive atelectasis.    [Urgent Result: Hemopneumoperitoneum]    Finding was identified on 6/10/2025 12:04 PM.     Eladio Reyes MD was contacted by Dr. Block at 6/10/2025 12:28 PM and  verbalized understanding of the urgent finding.     I have personally reviewed the examination and initial interpretation  and I agree with the findings.    VINCENZO VALENCIA DO         SYSTEM ID:  [id number]

## 2025-06-10 NOTE — PROGRESS NOTES
[facility] Medicine Cross Cover Note    Sylvia 10, 2025 2:58 AM    I was notified by RN that this patient has ongoing low urine output. Bladder scan 300-400ml but with underlying ascites unclear accuracy.  King irrigated but no change.    Action taken:   -change IVF rate to 125ml/hr  -UA    Communicated plan via secure messaging.     I spent 10 minutes on the date of the encounter doing chart review, history and exam, documentation and further activities noted above.     Cyndy Salazar MD on 6/10/2025 at 2:58 AM    5:18a  He remains oliguric. Urine appears orange, not bloody.  UA moderate blood, RBC10, WBC8    When I saw the patient, he was awake, feeling very uncomfortable with distended abdomen. His therapeutic para was postponed to today given surgical interventions on 6/9.  AM labs reviewed.   New EDISON Cre 1.47 up from 0.7 yesterday.  Bilirubin now 2.2  Not on nephrotoxic meds    - replace king to ensure it is correctly in his bladder    EDISON possibly due to transient hypotension, contrast, external compression from ascites   - contact IR to get para done early this am  - added CPK level    King was removed and re insertion was attempted, but was not successful. The patient preferred to keep it removed and try to void on his own.    At 6:45a paged that his BP dropped acutely to 66/ .  When I saw him he looked pale. Started on norepi    - vascular surgery paged: leg assessed, no significant bleed to explain the acute hypotension    transfusion as most concerning for acute bleed ? Intraabdominal, other possibilities include PE etc  - labs sent    I spoke with the son Juice and updated of this morning's acute hypotension and transfer to ICU.   They are heading to the hospital

## 2025-06-11 VITALS
BODY MASS INDEX: 32.94 KG/M2 | WEIGHT: 209.88 LBS | HEIGHT: 67 IN | OXYGEN SATURATION: 90 % | TEMPERATURE: 99.9 F | SYSTOLIC BLOOD PRESSURE: 60 MMHG | DIASTOLIC BLOOD PRESSURE: 44 MMHG

## 2025-06-11 PROBLEM — F10.21 ALCOHOL DEPENDENCE IN REMISSION (H): Status: ACTIVE | Noted: 2022-05-17

## 2025-06-11 PROBLEM — M72.0 DUPUYTREN CONTRACTURE OF BOTH HANDS: Status: ACTIVE | Noted: 2025-01-01

## 2025-06-11 LAB
ALBUMIN SERPL BCG-MCNC: 2.2 G/DL (ref 3.5–5.2)
ALLEN'S TEST: ABNORMAL
ALP SERPL-CCNC: 50 U/L (ref 40–150)
ALT SERPL W P-5'-P-CCNC: 472 U/L (ref 0–70)
ANION GAP SERPL CALCULATED.3IONS-SCNC: 16 MMOL/L (ref 7–15)
APTT PPP: 47 SECONDS (ref 22–38)
AST SERPL W P-5'-P-CCNC: 1886 U/L (ref 0–45)
ATRIAL RATE - MUSE: 124 BPM
ATRIAL RATE - MUSE: 89 BPM
ATRIAL RATE - MUSE: 93 BPM
ATRIAL RATE - MUSE: 97 BPM
BASE EXCESS BLDA CALC-SCNC: -5.5 MMOL/L (ref -3–3)
BILIRUB SERPL-MCNC: 1.5 MG/DL
BLD PROD TYP BPU: NORMAL
BLOOD COMPONENT TYPE: NORMAL
BUN SERPL-MCNC: 37.9 MG/DL (ref 8–23)
CA-I BLD-MCNC: 4.2 MG/DL (ref 4.4–5.2)
CALCIUM SERPL-MCNC: 8 MG/DL (ref 8.8–10.4)
CF REDUC 60M P MA LENFR BLD TEG: 0 % (ref 0–15)
CFT BLD TEG: 20.7 MINUTE (ref 1–3)
CHLORIDE SERPL-SCNC: 106 MMOL/L (ref 98–107)
CI (COAGULATION INDEX)(Z) NON NATIVE: -31.8 (ref -3–3)
CLOT ANGLE BLD TEG: 11.1 DEGREES (ref 53–72)
CLOT INIT BLD TEG: 34.4 MINUTE (ref 5–10)
CLOT LYSIS 30M P MA LENFR BLD TEG: 0 % (ref 0–8)
CLOT STRENGTH BLD TEG: 3.9 KD/SC (ref 4.5–11)
CODING SYSTEM: NORMAL
CREAT SERPL-MCNC: 2.87 MG/DL (ref 0.67–1.17)
DIASTOLIC BLOOD PRESSURE - MUSE: NORMAL MMHG
EGFRCR SERPLBLD CKD-EPI 2021: 23 ML/MIN/1.73M2
ERYTHROCYTE [DISTWIDTH] IN BLOOD BY AUTOMATED COUNT: 15.9 % (ref 10–15)
ERYTHROCYTE [DISTWIDTH] IN BLOOD BY AUTOMATED COUNT: 16 % (ref 10–15)
FIBRINOGEN PPP-MCNC: 537 MG/DL (ref 170–510)
GLUCOSE BLDC GLUCOMTR-MCNC: 121 MG/DL (ref 70–99)
GLUCOSE BLDC GLUCOMTR-MCNC: 135 MG/DL (ref 70–99)
GLUCOSE BLDC GLUCOMTR-MCNC: 151 MG/DL (ref 70–99)
GLUCOSE SERPL-MCNC: 135 MG/DL (ref 70–99)
HCO3 BLD-SCNC: 19 MMOL/L (ref 21–28)
HCO3 SERPL-SCNC: 18 MMOL/L (ref 22–29)
HCT VFR BLD AUTO: 23.8 % (ref 40–53)
HCT VFR BLD AUTO: 25.6 % (ref 40–53)
HGB BLD-MCNC: 8.1 G/DL (ref 13.3–17.7)
HGB BLD-MCNC: 8.9 G/DL (ref 13.3–17.7)
INR PPP: 5.76 (ref 0.85–1.15)
INTERPRETATION ECG - MUSE: NORMAL
ISSUE DATE AND TIME: NORMAL
LACTATE SERPL-SCNC: 4.2 MMOL/L (ref 0.7–2)
LACTATE SERPL-SCNC: 5.5 MMOL/L (ref 0.7–2)
MAGNESIUM SERPL-MCNC: 2.9 MG/DL (ref 1.7–2.3)
MCF BLD TEG: 43.7 MM (ref 50–70)
MCH RBC QN AUTO: 29.1 PG (ref 26.5–33)
MCH RBC QN AUTO: 29.6 PG (ref 26.5–33)
MCHC RBC AUTO-ENTMCNC: 34 G/DL (ref 31.5–36.5)
MCHC RBC AUTO-ENTMCNC: 34.8 G/DL (ref 31.5–36.5)
MCV RBC AUTO: 85 FL (ref 78–100)
MCV RBC AUTO: 86 FL (ref 78–100)
O2/TOTAL GAS SETTING VFR VENT: 50 %
OXYHGB MFR BLDA: 94 % (ref 92–100)
P AXIS - MUSE: 16 DEGREES
P AXIS - MUSE: 26 DEGREES
P AXIS - MUSE: 27 DEGREES
P AXIS - MUSE: 41 DEGREES
PCO2 BLD: 34 MM HG (ref 35–45)
PEEP: 5 CM H2O
PH BLD: 7.37 [PH] (ref 7.35–7.45)
PHOSPHATE SERPL-MCNC: 7 MG/DL (ref 2.5–4.5)
PLATELET # BLD AUTO: 274 10E3/UL (ref 150–450)
PLATELET # BLD AUTO: 279 10E3/UL (ref 150–450)
PO2 BLD: 83 MM HG (ref 80–105)
POTASSIUM SERPL-SCNC: 5.6 MMOL/L (ref 3.4–5.3)
PR INTERVAL - MUSE: 162 MS
PR INTERVAL - MUSE: 166 MS
PR INTERVAL - MUSE: 172 MS
PR INTERVAL - MUSE: 176 MS
PROT SERPL-MCNC: 4.3 G/DL (ref 6.4–8.3)
PROTHROMBIN TIME: 50.9 SECONDS (ref 11.8–14.8)
QRS DURATION - MUSE: 70 MS
QRS DURATION - MUSE: 74 MS
QRS DURATION - MUSE: 80 MS
QRS DURATION - MUSE: 84 MS
QT - MUSE: 314 MS
QT - MUSE: 360 MS
QT - MUSE: 368 MS
QT - MUSE: 400 MS
QTC - MUSE: 451 MS
QTC - MUSE: 457 MS
QTC - MUSE: 457 MS
QTC - MUSE: 486 MS
R AXIS - MUSE: -2 DEGREES
R AXIS - MUSE: 11 DEGREES
R AXIS - MUSE: 22 DEGREES
R AXIS - MUSE: 4 DEGREES
RBC # BLD AUTO: 2.78 10E6/UL (ref 4.4–5.9)
RBC # BLD AUTO: 3.01 10E6/UL (ref 4.4–5.9)
SAO2 % BLDA: 96 % (ref 95–96)
SODIUM SERPL-SCNC: 140 MMOL/L (ref 135–145)
SYSTOLIC BLOOD PRESSURE - MUSE: NORMAL MMHG
T AXIS - MUSE: 17 DEGREES
T AXIS - MUSE: 18 DEGREES
T AXIS - MUSE: 20 DEGREES
T AXIS - MUSE: 30 DEGREES
TROPONIN T SERPL HS-MCNC: 120 NG/L
TROPONIN T SERPL HS-MCNC: 124 NG/L
TROPONIN T SERPL HS-MCNC: 129 NG/L
UNIT ABO/RH: NORMAL
UNIT NUMBER: NORMAL
UNIT STATUS: NORMAL
UNIT TYPE ISBT: 5100
UNIT TYPE ISBT: 5100
UNIT TYPE ISBT: 9500
VENTRICULAR RATE- MUSE: 124 BPM
VENTRICULAR RATE- MUSE: 89 BPM
VENTRICULAR RATE- MUSE: 93 BPM
VENTRICULAR RATE- MUSE: 97 BPM
WBC # BLD AUTO: 10.5 10E3/UL (ref 4–11)
WBC # BLD AUTO: 10.8 10E3/UL (ref 4–11)

## 2025-06-11 PROCEDURE — 250N000011 HC RX IP 250 OP 636

## 2025-06-11 PROCEDURE — 83735 ASSAY OF MAGNESIUM: CPT

## 2025-06-11 PROCEDURE — 250N000013 HC RX MED GY IP 250 OP 250 PS 637

## 2025-06-11 PROCEDURE — 85610 PROTHROMBIN TIME: CPT

## 2025-06-11 PROCEDURE — 84155 ASSAY OF PROTEIN SERUM: CPT

## 2025-06-11 PROCEDURE — 99233 SBSQ HOSP IP/OBS HIGH 50: CPT | Mod: 24

## 2025-06-11 PROCEDURE — 83605 ASSAY OF LACTIC ACID: CPT

## 2025-06-11 PROCEDURE — 84484 ASSAY OF TROPONIN QUANT: CPT

## 2025-06-11 PROCEDURE — 258N000003 HC RX IP 258 OP 636: Performed by: INTERNAL MEDICINE

## 2025-06-11 PROCEDURE — 999N000248 HC STATISTIC IV INSERT WITH US BY RN

## 2025-06-11 PROCEDURE — 94003 VENT MGMT INPAT SUBQ DAY: CPT

## 2025-06-11 PROCEDURE — P9012 CRYOPRECIPITATE EACH UNIT: HCPCS

## 2025-06-11 PROCEDURE — 999N000157 HC STATISTIC RCP TIME EA 10 MIN

## 2025-06-11 PROCEDURE — 82805 BLOOD GASES W/O2 SATURATION: CPT

## 2025-06-11 PROCEDURE — 85027 COMPLETE CBC AUTOMATED: CPT

## 2025-06-11 PROCEDURE — 250N000011 HC RX IP 250 OP 636: Mod: JZ

## 2025-06-11 PROCEDURE — 84100 ASSAY OF PHOSPHORUS: CPT

## 2025-06-11 PROCEDURE — P9059 PLASMA, FRZ BETWEEN 8-24HOUR: HCPCS

## 2025-06-11 PROCEDURE — 85384 FIBRINOGEN ACTIVITY: CPT

## 2025-06-11 PROCEDURE — 82330 ASSAY OF CALCIUM: CPT

## 2025-06-11 PROCEDURE — 85730 THROMBOPLASTIN TIME PARTIAL: CPT

## 2025-06-11 PROCEDURE — 99291 CRITICAL CARE FIRST HOUR: CPT | Mod: 24 | Performed by: SURGERY

## 2025-06-11 PROCEDURE — 999N000259 HC STATISTIC EXTUBATION

## 2025-06-11 PROCEDURE — 82040 ASSAY OF SERUM ALBUMIN: CPT

## 2025-06-11 RX ORDER — LIDOCAINE 40 MG/G
CREAM TOPICAL
Status: DISCONTINUED | OUTPATIENT
Start: 2025-06-11 | End: 2025-06-11

## 2025-06-11 RX ORDER — GLYCOPYRROLATE 0.2 MG/ML
0.2 INJECTION, SOLUTION INTRAMUSCULAR; INTRAVENOUS ONCE
Status: COMPLETED | OUTPATIENT
Start: 2025-06-11 | End: 2025-06-11

## 2025-06-11 RX ORDER — ATROPINE SULFATE 10 MG/ML
1 SOLUTION/ DROPS OPHTHALMIC
Status: DISCONTINUED | OUTPATIENT
Start: 2025-06-11 | End: 2025-06-11 | Stop reason: HOSPADM

## 2025-06-11 RX ORDER — MIDAZOLAM HCL IN 0.9 % NACL/PF 1 MG/ML
1-8 PLASTIC BAG, INJECTION (ML) INTRAVENOUS CONTINUOUS
Status: DISCONTINUED | OUTPATIENT
Start: 2025-06-11 | End: 2025-06-11 | Stop reason: HOSPADM

## 2025-06-11 RX ORDER — GLYCOPYRROLATE 0.2 MG/ML
0.2 INJECTION, SOLUTION INTRAMUSCULAR; INTRAVENOUS ONCE
Status: DISCONTINUED | OUTPATIENT
Start: 2025-06-11 | End: 2025-06-11

## 2025-06-11 RX ORDER — METHYLPREDNISOLONE SODIUM SUCCINATE 125 MG/2ML
125 INJECTION INTRAMUSCULAR; INTRAVENOUS ONCE
Status: COMPLETED | OUTPATIENT
Start: 2025-06-11 | End: 2025-06-11

## 2025-06-11 RX ORDER — GLYCOPYRROLATE 0.2 MG/ML
0.1 INJECTION, SOLUTION INTRAMUSCULAR; INTRAVENOUS EVERY 4 HOURS PRN
Status: DISCONTINUED | OUTPATIENT
Start: 2025-06-11 | End: 2025-06-11 | Stop reason: HOSPADM

## 2025-06-11 RX ADMIN — PANTOPRAZOLE SODIUM 40 MG: 40 INJECTION, POWDER, FOR SOLUTION INTRAVENOUS at 08:03

## 2025-06-11 RX ADMIN — PIPERACILLIN AND TAZOBACTAM 3.38 G: 3; .375 INJECTION, POWDER, LYOPHILIZED, FOR SOLUTION INTRAVENOUS at 08:03

## 2025-06-11 RX ADMIN — METHYLPREDNISOLONE SODIUM SUCCINATE 125 MG: 125 INJECTION, POWDER, FOR SOLUTION INTRAMUSCULAR; INTRAVENOUS at 11:23

## 2025-06-11 RX ADMIN — GLYCOPYRROLATE 0.2 MG: 0.2 INJECTION INTRAMUSCULAR; INTRAVENOUS at 11:23

## 2025-06-11 RX ADMIN — SODIUM CHLORIDE, SODIUM LACTATE, POTASSIUM CHLORIDE, AND CALCIUM CHLORIDE: .6; .31; .03; .02 INJECTION, SOLUTION INTRAVENOUS at 01:17

## 2025-06-11 RX ADMIN — PIPERACILLIN AND TAZOBACTAM 3.38 G: 3; .375 INJECTION, POWDER, LYOPHILIZED, FOR SOLUTION INTRAVENOUS at 03:54

## 2025-06-11 RX ADMIN — CHLORHEXIDINE GLUCONATE 15 ML: 1.2 SOLUTION ORAL at 08:03

## 2025-06-11 RX ADMIN — Medication 100 MCG/HR: at 11:05

## 2025-06-11 RX ADMIN — PROPOFOL 30 MCG/KG/MIN: 10 INJECTION, EMULSION INTRAVENOUS at 04:22

## 2025-06-11 RX ADMIN — Medication 150 MCG: at 11:46

## 2025-06-11 RX ADMIN — INSULIN ASPART 1 UNITS: 100 INJECTION, SOLUTION INTRAVENOUS; SUBCUTANEOUS at 00:14

## 2025-06-11 ASSESSMENT — ACTIVITIES OF DAILY LIVING (ADL)
ADLS_ACUITY_SCORE: 53
ADLS_ACUITY_SCORE: 48
ADLS_ACUITY_SCORE: 53

## 2025-06-11 NOTE — PROGRESS NOTES
RT compassionately extubated pt to oximask per provider order at 1148. Pt was suctioned orally prior to extubation.    Juice Mariscal, RT on 6/11/2025 at 11:48 AM

## 2025-06-11 NOTE — PROGRESS NOTES
SURGICAL ICU PROGRESS NOTE  06/11/2025      PRIMARY TEAM:  Hospitalist Service, GOLD TEAM   PRIMARY PHYSICIAN: Dr. Oglesby    REASON FOR CRITICAL CARE ADMISSION: Hemodynamic support, ventilator requirements  ADMITTING PHYSICIAN: Joshua Valerio MD       ASSESSMENT: Gamaliel Johnson is a 69 year old male w/ newly diagnosed stage IV pancreatic adenocarcinoma with secondary large and small bowel obstructions, hemopneumoperitoneum and acute pulmonary emboli. He is s/p Right iliofemoral Thrombectomy, popliteal thrombectomy through separate incision, dorsalis pedis and posterior tibial thrombectomy through separate incisions, Angiogram right leg, right leg, fasciotomy with Dr. Zamudio, admitted to the SICU on 6/10 after developing profound hypotension requiring emergent intubation.    PMH: hepatitis C, compensated cirrhosis, HTN, tobacco use (chew), EtOH use disorder, possible EtOH withdrawal seizures (on keppra intermittently).      6/11/2025 Today daughter Elsa and son has decided to initiate comfort care for Gamaliel.     Changes Today:  - Comfort cares; comfort care order set  -  discontinue antibiotics, labs  -  Extubation order set  - Continue pressors for now      Neurological:  # Acute pain   - COMFORT CARE  - Monitor neurological status. Delirium preventions and precautions  - Pain: Fentanyl gtt, Tylenol 650 mg Q4H PRN, Dilaudid 0.2-0.4 mg Q2h PRN   - Sedation plan: propofol gtt, RASS goal 0 to -1  - PRN: fentanyl, versed     # AUD drinking about 6 pack a day for many years  # possible EtOH withdrawal seizures (on keppra intermittently).  # marijuana use   # tobacco use (chew)        Pulmonary:   # Post operative ventilatory support  # Acute hypoxic respiratory support  - VENT: FiO2 (%): (S) 100 % (RT at bedside, SICU notfied), Resp: 16, Vent Mode: VC/AC, Resp Rate (Set): 15 breaths/min, Tidal Volume (Set, mL): 500 mL, PEEP (cm H2O): 5 cmH2O, Resp Rate (Set): 15 breaths/min, Tidal Volume (Set, mL): 500 mL,  PEEP (cm H2O): 5 cmH2O   - Comfort cares, extubation today  - PRN robinul and pre-extubation      # New PE, RLL, segmental, with possible infarct   - discontinue hep gtt  - comfort care    Cardiovascular:    # Shock-hemorrhagic  # Shock-distributive   - Vasopressors: titrate Levophed and Vasopressin, wean as able after extubation.     # HTN  - comfort care      Gastroenterology/Nutrition:  # Protein calorie deficit malnutrition   - NPO except for meds  - Comfort care      -   # Stage IV pancreatic adenocarcinoma  # Peritoneal carcinomatosis  # Distal pancreatic mass, s/p EUS FNA on 6/6/25 with poorly differentiated carcinoma  - Surgical oncology consult 6/8  - we would not have any plans for surgical intervention given the fact that he has peritoneal disease with ascites. I discussed that treatment should be focused on palliation of symptoms as they arise. Currently he is passing gas and liquid stool and therefore I would probably wait another day or 2 before any further investigations assuming that he continues to be less distended over the course of the next 48 to 72 hours.   - Comfort care  # Large bowel obstruction secondary to mass in the sigmoid colon  #c/f Partial SBO (imaging 6/7/25)   - 6/5 s/p colonoscopy with stent placement  - Bx without malignancy, although unclear if adequately sampled  - The advanced endoscopy team can be re-consulted if a venting G-tube becomes necessary with goals of going home on hospice although currently patient is critically ill and would recommend ongoing GOC discussion/   - Appreciate excellent supportive care per ICU team   - comfort care  # Hemopneumoperitoneum  # Perforated bowel  CT AP from 6/10 showing new hemopneumoperitoneum  - EGS consult 6/10:   - No surgical intervention is recommended at this time. Recommendations were communicated to the patients daughter and son by phone  - Recommend ongoing goals of care discussion, planned care conference today  - comfort  care    # Elevated LFTs   # hepatitis C  # compensated cirrhosis  Comfort care  -  Fluids/Electrolytes/Renal:   # EDISON  Comfort care      # Acute kidney injury  # Urinary retention  Comfort cares        Endocrine:  # Stress hyperglycemia   # Prediabetes   Comfort cares  Recent Labs   Lab 06/11/25  0403 06/11/25  0401 06/11/25  0012 06/10/25  2043 06/10/25  1634   * 135* 151* 160*  154* 139*       ID:  # C/f septic shock  Comfort cares        Positive cultures:  - comfort care      Heme:     # Acute blood loss anemia   # Anemia of critical illness   Comfort cares    MSK:  # Weakness and deconditioning of critical illness  Comfort cares    # Acute R limb ischemia s/p emergency vascular surgery on 6/9/25   - s/p Right iliofemoral thrombectomy from groin incision, right popliteal and tibial thrombectomy from calf incision, posterior tibial and dorsalis pedis thrombectomies from separate pedal incisions, 4 compartment fasciotomy, right common iliac artery angioplasty and stenting.   - Vascular surgery Recommendations:    No current surgical recommendations   Comfort cares    General Cares/Prophylaxis:    DVT Prophylaxis: Comfort cares  GI Prophylaxis: Comfort cares  Restraints: Restraints for medical healing needed: NO    Lines/ tubes/ drains:  - ETT: extubate  - CVC Triple lumen left femoral  - Arterial line femoral: remove  - king  - PIV x 2  - NG tube: remove    Disposition:  - Surgical ICU  - comfort care discussed with daughter and son who are by the bedside, they are devastated rightfully. They would like to keep him as comfortable as possible moving forward.       Patient seen and discussed with staff.    Aaron Arce, MS4  [employer]    Resident Attestation   I, Clara Padron DO, was present with the medical student who participated in the service and in the documentation of the note. I have verified the history and personally performed the physical exam and medical decision  making. I agree with the assessment and plan of care as documented in the note and have edited where appropriate.      JONAH Padron, DO  General Surgery PGY-1      ====================================  History of present illness:  Gamaliel Johnson is a 69 year old male w/ pmh pertinent for hepatitis C, HTN, tobacco use (chew), EtOH use disorder, possible EtOH withdrawal seizures (on keppra intermittently). He was admitted on 6/4/2025 for management of abdominal pain and vomiting and was found to have large bowel obstruction and new PE. Patient had undergone recent umbilical hernia repair on 5/30/25 at which time was seen to have nodularities concerning for malignancy and from which biopsy returned positive for adenocarcinoma. On 6/7 he seemed to develop a small bowel obstruction as well. On 6/9 he developed critical ischemia to RLE and was taken urgently to OR by vascular surgery for surgical intervention. Following surgery on 6/9 he required 2 U pRBCS due to bleeding from RLE fasciotomy site. On 6/10 he developed profound hypotension and required emergent intubation for airway protection with admission to the SICU.       OBJECTIVE:     Temp:  [97.4  F (36.3  C)-99.6  F (37.6  C)] 99  F (37.2  C)  Pulse:  [] 93  Resp:  [16-20] 16  MAP:  [57 mmHg-108 mmHg] 74 mmHg  Arterial Line BP: ()/(40-78) 135/50  FiO2 (%):  [50 %-100 %] 100 %  SpO2:  [90 %-100 %] 90 %  FiO2 (%): (S) 100 % (RT at bedside, SICU notfied), Resp: 16, Vent Mode: VC/AC, Resp Rate (Set): 15 breaths/min, Tidal Volume (Set, mL): 500 mL, PEEP (cm H2O): 5 cmH2O, Resp Rate (Set): 15 breaths/min, Tidal Volume (Set, mL): 500 mL, PEEP (cm H2O): 5 cmH2O    I/O last 3 completed shifts:  In: 5505.85 [I.V.:3275.85; NG/GT:30; IV Piggyback:1300]  Out: 705 [Urine:205; Emesis/NG output:500]    General/Neuro: Intubated and sedated, ET tube in place, does not rouse to voice, does not follow commands  CV: RRR, normal S1 and S2, no murmurs or gallops  Pulm:  Mechanically ventilated, symmetric chest rise, normal breath sounds  Abd: distended, no rebound, no guarding  Extremities: no edema, right lower extremity is wrapped in a clean, dry ace bandage  Skin: warm and well-perfused.     LABS:   Arterial Blood Gases   Recent Labs   Lab 06/11/25  0402 06/10/25  2044 06/10/25  1715 06/10/25  1217   PH 7.37 7.41 7.42 7.47*   PCO2 34* 33* 34* 24*   PO2 83 74* 81 115*   HCO3 19* 21 22 18*     Complete Blood Count   Recent Labs   Lab 06/11/25  0401 06/11/25  0158 06/10/25  0752 06/10/25  0331   WBC 10.8 10.5 11.0 9.8   HGB 8.1* 8.9* 9.4* 10.9*  10.9*    279 302 319     Basic Metabolic Panel  Recent Labs   Lab 06/11/25  0403 06/11/25  0401 06/11/25  0012 06/10/25  2043 06/10/25  1634 06/10/25  1630 06/10/25  1223 06/10/25  1218   NA  --  140  --  139  --  143  --  143   POTASSIUM  --  5.6*  --  5.3  --  3.8  --  3.0*   CHLORIDE  --  106  --  106  --  115*  --  117*   CO2  --  18*  --  19*  --  17*  --  15*   BUN  --  37.9*  --  31.9*  --  24.5*  --  19.4   CR  --  2.87*  --  2.27*  --  1.67*  --  1.34*   * 135* 151* 160*  154*   < > 140*   < > 129*    < > = values in this interval not displayed.     Liver Function Tests  Recent Labs   Lab 06/11/25  0611 06/11/25  0401 06/10/25  2211 06/10/25  2043 06/10/25  1218 06/10/25  0854 06/10/25  0752   AST  --  1,886*  --  1,932* 1,055*  --  153*   ALT  --  472*  --  525* 390*  --  67   ALKPHOS  --  50  --  45 27*  --  44   BILITOTAL  --  1.5*  --  1.3* 1.1  --  1.8*   ALBUMIN  --  2.2*  --  2.0* 1.4*  --  2.1*   INR 5.76*  --  >10.00*  --  >10.00* >10.00*  --      Pancreatic Enzymes  No lab results found in last 7 days.    Coagulation Profile  Recent Labs   Lab 06/11/25  0611 06/10/25  2211 06/10/25  1218 06/10/25  0854   INR 5.76* >10.00* >10.00* >10.00*   PTT 47* 64* 93* 182*         IMAGING:   Recent Results (from the past 24 hours)   XR Chest Port 1 View    Narrative    Portable chest 6/10/2025 at 0904    INDICATION:  Check endotracheal tube position    COMPARISON: Earlier today 0721 hours    Low inspiratory volumes presumably from portable technique. Heart size  normal. Endotracheal tube tip is approximately 2.5 cm above the  eleanor. NG/OG tube beyond the inferior margin of the image. Streaky  densities in the lungs are similar which may indicate mild  edema/atelectasis. No ectopic air. Overall, no significant change in  the appearance of the lungs with the interval change being that of  endotracheal intubation.      Impression    IMPRESSION: Interval endotracheal intubation with grossly appropriate  position of the tubing. Minimal edema/atelectasis in the lungs.    JUANY REDD MD         SYSTEM ID:  [id number]   CTA GI Bleed   Result Value    Radiologist flags Hemopneumoperitoneum (Urgent)    Narrative    Exam: Computed tomographic angiography of the abdomen and pelvis  without and with contrast, including 3D reformations dated 6/10/2025  11:14 AM    Clinical information:  hypotension, dyspnea, worsening distended  abdomen    Technique: Helical scans through the abdomen and pelvis obtained  before the administration of intravenous contrast media and following  the injection of contrast media  in the arterial phase. Source images  reviewed as well as 3D and multi-planar reconstructions.    Comparison study: CT of 6/7/2025, 6/4/2025    Findings:      VESSELS:     The abdominal aorta is normal in caliber.    The celiac axis, SMA and RISA are patent. The single renal arteries are  patent bilaterally. Right common iliac stent patent. Mixed calcific  atherosclerosis of the common femoral artery bifurcation bilaterally.  Subcutaneous emphysema overlying the right groin likely related to  recent access. Left groin approach central venous catheter tip  terminating in the proximal left common iliac vein and arterial  catheter tip terminating in the mid left external iliac artery.    The splenic vein, portal vein, SMV and renal veins  are  patent.  The hepatic veins and IVC are patent.    ABDOMEN/PELVIS:  LOWER THORAX: Small left greater than right loculated pleural  effusions with adjacent compressive atelectasis. Heart size is normal.  No pericardial effusion.    LIVER: No focal hepatic mass.    BILIARY: Normal appearance of the gallbladder. No intra- or  extrahepatic biliary dilation. Vicarious excretion of previously  administered contrast within the gallbladder lumen.    PANCREAS: No focal pancreatic mass or ductal dilation.    SPLEEN: No splenic mass.    ADRENAL GLANDS: Within normal limits.    URINARY TRACT: Urinary catheter in place. No suspicious renal mass,  renal calculi, or hydronephrosis. No bladder wall thickening.    REPRODUCTIVE ORGANS: No suspicious pelvic mass.    STOMACH: Enteric tube in place. Within normal limits.    BOWEL/PERITONEUM/FLUID: There is new moderate volume hemoperitoneum  with layering mixed density material most pronounced in the left  paracolic gutter and pelvis without active source of arterial  extravasation identified. Small volume free air in the mid mid abdomen  with foci of air scattered throughout the mesentery. Source of  perforation may be in the proximal jejunum with few transmural foci of  air (16/211) versus the lateral end of the colonic stent (16/365) .  Mild small bowel distention likely reactive to ongoing peritoneal  fluid. Appendix is normal.     LYMPH NODES: Scattered mesenteric prominent lymph nodes.    BONES/SOFT TISSUES: No aggressive osseous lesions.      Impression    Impression:   1. New moderate volume hemoperitoneum with regions of layering  heterogenous fluid both in the left paracolic cluster as well as the  mid pelvis suggestive of recent bleeding. No definitive feeding vessel  or focus of acute arterial extravasation.  2. Increased small volume pneumoperitoneum without definitive source  of perforation, though suspicion is in the lateral/proximal edge of  the colonic stent as well  as possible few diverticula in the inflamed  proximal jejunum.  3. Small bilateral loculated pleural effusions with adjacent  compressive atelectasis.    [Urgent Result: Hemopneumoperitoneum]    Finding was identified on 6/10/2025 12:04 PM.     Eladio Reyes MD was contacted by Dr. Block at 6/10/2025 12:28 PM and  verbalized understanding of the urgent finding.     I have personally reviewed the examination and initial interpretation  and I agree with the findings.    VINCENZO VALENCIA DO         SYSTEM ID:  [id number]   XR Chest Port 1 View    Narrative    Portable chest 6/10/2025 at 1718 hours    INDICATION: Tachyarrhythmia. Hypotension. Diaphoresis.    COMPARISON: 0904 same day    Bibasilar haziness unchanged. Costophrenic angles are obscured.  Degenerative changes in the thoracic spine. Endotracheal tube tip  approximately 3.8 cm above the eleanor. Esophageal temperature probe is  just above the diaphragmatic hiatus. NG/OG tube beyond the inferior  margin of the image.      Impression    IMPRESSION: Continued edema and pleural effusions in the lung bases  bilaterally.    JUANY REDD MD         SYSTEM ID:  [id number]

## 2025-06-11 NOTE — PROGRESS NOTES
SICU, vascular, and bedside RN at bedside with patient's son and daughter. Patient extubated to comfort care today per family.

## 2025-06-11 NOTE — PLAN OF CARE
Major Shift Events:   Neuro: sedated on prop/fent, opens eyes to pain, PERRL, withdraws in ext  Cardiac: SR HR 90s with PACs, MAP > 65 on vaso/levo, pulses palpable BUE & LLE/RLE popliteal with doppler (no pulses present lower - vascular assessed), afebrile  Resp: lung sounds clear/dim, tolerating vent settings 500/15/5/60%   GI: ab taut, NGT to LIS, bowel sounds hardly audible, LBM 6/5  : king intact, irrigated per order, no UO since 2200 (SICU aware)   Skin: R groin incision CDI, RLE fasciotomy site dressing intact, umbilical incision intact   LDA: L & R PIV infusing IVMF/abx, R fem cvc infusing pressors/sedation/blood products, L fem art line  Labs: reviewed, K 5.6, Cr 2.87, liver enzymes trending up, trending trops, LA 5.5, Hgb 8.1, INR > 10 (2 units FFP, 1 unit cryo given)  Plan: continue POC   For vital signs and complete assessments, please see documentation flowsheets.

## 2025-06-11 NOTE — PROCEDURES
Procedures:   Left femoral arterial line placement   Left femoral central venous catheter placement     Name: Gamaliel Johnson   MRN: [id number]   Date: Sylvia 10, 2025     Indication: Hypotension, shock - likely hemorrhagic vs distributive, need for hemodynamic monitoring and frequent labs     Consent: emergent     The left groin was prepped and draped. A time out for safety was performed. The left femoral vein was visualized with ultrasound and accessed with a needle. A wire was passed into the vessel using the Seldinger technique. The needle was removed. A skin knick was made, and the subcutaneous tissues were dilated. The 7 Fr triple lumen catheter was advanced over the wire into the vessel. The wire was removed from the patient. All ports flushed and aspirated without issue. The line was sutured in place.     The left femoral artery was then visualized with ultrasound and accessed with a needle. A wire was advanced through the needle into the vessel using the Seldinger technique. The needle was removed. The arterial catheter was advanced over the wire into the vessel. The wire was removed from the patient. The catheter was transduced, and there was a good waveform. The line was sutured in place. Both were dressed with a Biopatch and Tegaderm.     Hollie Long MD   SICU Fellow

## 2025-06-11 NOTE — PROGRESS NOTES
Goals of Care Discussion:     I spoke with the patient's two children at the bedside.   I confirmed that the code status is DNAR.   They express a desire to focus on comfort and compassionately extubate today, knowing that he may die shortly after extubation.   Stop all labs, antibiotics, transfusions.   Appreciate Palliative Care involvement.     Hollie Long MD   SICU Fellow

## 2025-06-11 NOTE — DEATH PRONOUNCEMENT
MD DEATH PRONOUNCEMENT    Called to pronounce Gamaliel Johnson dead.    Physical Exam: Unresponsive to noxious stimuli, Spontaneous respirations absent, Breath sounds absent, Carotid pulse absent, Heart sounds absent, and Pupillary light reflex absent    Patient was pronounced dead at 1:52 PM, 2025.    Preliminary Cause of Death: Multi-organ failure related to shock, Pancreatic adenocarcinoma with carcinomatosis    Active Problems:    Other pulmonary embolism without acute cor pulmonale, unspecified chronicity (H)       Infectious disease present?: YES    Communicable disease present? (examples: HIV, chicken pox, TB, Ebola, CJD) :  Yes. Hepatitis C    Multi-drug resistant organism present? (example: MRSA): NO    Please consider an autopsy if any of the following exist:  NO Unexpected or unexplained death during or following any dental, medical, or surgical diagnostic treatment procedures.   NO Death of mother at or up to seven days after delivery.     NO All  and pediatric deaths.     NO Death where the cause is sufficiently obscure to delay completion of the death certificate.   NO Deaths in which autopsy would confirm a suspected illness/condition that would affect surviving family members or recipients of transplanted organs.     The following deaths must be reported to the 's Office:  NO A death that may be due entirely or in part to any factors other than natural disease (recent surgery, recent trauma, suspected abuse/neglect).   NO A death that may be an accident, suicide, or homicide.     NO Any sudden, unexpected death in which there is no prior history of significant heart disease or any other condition associated with sudden death.   NO A death under suspicious, unusual, or unexpected circumstances.    NO Any death which is apparently due to natural causes but in which the  does not have a personal physician familiar with the patient s medical history, social, or  environmental situation or the circumstances of the terminal event.   NO Any death apparently due to Sudden Infant Death Syndrome.     NO Deaths that occur during, in association with, or as consequences of a diagnostic, therapeutic, or anesthetic procedure.   NO Any death in which a fracture of a major bone has occurred within the past (6) six months.   NO A death of persons note seen by their physician within 120 days of demise.     NO Any death in which the  was an inmate of a public institution or was in the custody of Law Enforcement personnel.   NO  All unexpected deaths of children   NO Solid organ donors   NO Unidentified bodies   NO Deaths of persons whose bodies are to be cremated or otherwise disposed of so that the bodies will later be unavailable for examination;   NO Deaths unattended by a physician outside of a licensed healthcare facility or licensed residential hospice program   NO Deaths occurring within 24 hours of arrival to a health care facility if death is unexpected.    NO Deaths associated with the decedent s employment.   NO Deaths attributed to acts of terrorism.   NO Any death in which there is uncertainty as to whether it is a medical examiner s care should be discussed with the medical investigator.        Body disposition: Body released to the morgue.

## 2025-06-11 NOTE — PROGRESS NOTES
PALLIATIVE CARE PROGRESS NOTE  [facility]     Patient Name: Gamaliel Johnson  Date of Admission: 6/4/2025   Today the patient was seen for: Goals of care, symptoms, support     Recommendations & Counseling     GOALS OF CARE:   Planning to transition to comfort care once family is ready today.   Family would like to prioritize alertness, see if they are able to interact with Gamaliel once he is extubated.   Plan we discussed/recommended:   Glycopyrrolate 30-60 minutes prior to extubation (I did order this)  Please remove arterial line, NG tube, and additional lines not needed for IV medications/comfort prior to extubation.   Stop propofol prior to extubation  Will continue pressors initially, with plan to wean them once patient is not as responsive/unable to interact with family  Extubate to oxymask with additional oxygen support initially. Once patient less interactive/not responsive, plan to wean that to 2-4L for comfort with heavier use of opioids for air hunger.   Utilize low doses of fentanyl before extubation and after extubation if needed - however family request that initially we assess if he is able to be alert and awake/interact with them first before utilizing them to the point of sedating him more.   We discussed that if he is uncomfortable on oxygen, we can always utilize opioids to manage these symptoms and treat his air hunger.    MEDICAL MANAGEMENT:   Comfort care recommendations:   Turn off all monitors. Stop vitals, labs, ordered studies, procedures.   Lines, drains, tubes, restraints:  Can consider continuing urinary catheters and fecal management systems with patient's comfort in mind. Otherwise, discontinue all other lines.   If alert, patient can be offered food/fluids for comfort by mouth if appropriate - discussed that PO intake may cause more pain given his bowel perforation.   Pain/Dyspnea:   Fentanyl gtt & boluses per SICU  Lorazepam 0.5mg SL/IV  q4h prn anxiety from dyspnea if opioid is not effective  Haldol 2 mg SL/IV q6h prn   Secretions:   Discontinue or reduce IV fluid and enteral feeding  Gentle oropharyngeal suctioning may be used but avoid deep suctioning  Positioning - Reposition the body in a lateral position on either left or right side to facilitate drainage.  Glycopyrrolate IV 0.2mg q4h prn (can increase up to 0.4mg q4h prn)  Atropine drops SL 1-2 drops q4h prn   If above ineffective, could consider scopolamine transdermal patch  Nausea/Vomiting:   Zofran 4mg PO/IV q6h prn - can increase up to 8mg  Compazine 10mg PO/IV q6h prn  Haldol 2mg SL/IV q6h prn   Bowel Regimen:   Dulcolax 10mg rectal daily prn     PSYCHOSOCIAL/SPIRITUAL:  [place] community: Zoroastrianism - declined  or  support today  Support system: daughter and her  live in [place] with their [id number]; son and daughter-in-law live in [place] with their 3 children, the oldest of whom is 11 years old.    Palliative care will continue to follow patient. If any urgent needs arise please reach out to our team via [employer].     These recommendations have been discussed with SICU, bedside RN, unit SW.    MALDONADO Hdez CNP  [facility], Palliative Care  Securely message with the [employer] Web Console (learn more here) or  Text page via [facility] Paging/Directory        Assessment          Gamailel Johnson is a 69 year old male admitted on 6/4/2025 for management of abdominal pain and vomiting found to have large bowel obstruction, likely carcinomatosis, and new PE. Patient had undergone recent umbilical hernia repair on 5/30/25 at which time was seen to have nodularities concerning for malignancy and from which biopsy returned positive for adenocarcinoma.  S/p colonoscopy with colonic stent placement 6/5, as well as biopsy of pancreatic mass which showed pancreatic adenocarcinoma.     Today, the patient was seen for:  Palliative care encounter  Support  Goals of care        Interval History:     Multidisciplinary collaboration:  Chart reviewed. Discussed with SICU and bedside RN.     See plan discussed with family above today.     Review of Systems:     Besides above, ROS was reviewed and is unremarkable.      Physical Exam:   Temp:  [97.4  F (36.3  C)-99.6  F (37.6  C)] 99.5  F (37.5  C)  Pulse:  [] 94  Resp:  [16] 16  MAP:  [57 mmHg-108 mmHg] 65 mmHg  Arterial Line BP: ()/(40-78) 122/47  FiO2 (%):  [50 %-100 %] 100 %  SpO2:  [89 %-100 %] 92 %  209 lbs 14.05 oz    GEN: Intubated and appears sedated on my exam      Data Reviewed:     Medical Decision Making       MANAGEMENT DISCUSSED with the following over the past 24 hours: SICU, bedside RN   NOTE(S)/MEDICAL RECORDS REVIEWED over the past 24 hours: nursing, ICU  SUPPLEMENTAL HISTORY, in addition to the patient's history, over the past 24 hours obtained from:   - Son and daughter  Medical complexity over the past 24 hours:  - Decision to DE-ESCALATE CARE based on prognosis      Chart documentation was completed using Dragon voice-recognition software. Even though reviewed, some grammatical, spelling, and word errors may remain.

## 2025-06-12 LAB
BACTERIA SPEC CULT: NORMAL
BACTERIA SPEC CULT: NORMAL
BACTERIA SPT CULT: NO GROWTH
GRAM STAIN RESULT: NORMAL
GRAM STAIN RESULT: NORMAL
UPPER EUS: NORMAL

## 2025-06-12 NOTE — DISCHARGE SUMMARY
[facility]  SURGICAL ICU  DISCHARGE SUMMARY    Gamaliel Johnson MRN# [id number]   YOB: 1955 Age: 69 year old     Date of Admission:  2025  Date of Discharge::   2025  Admitting Physician:  Joshua Valerio MD  Discharge Physician:  Brett Valentin MD  Primary Care Physician:             Brian Muse MD         Admission Diagnoses:   Malignant tumor of intestine (H) [C26.0]  Pancreatic mass [K86.89]  Other pulmonary embolism without acute cor pulmonale, unspecified chronicity (H) [I26.99]            Discharge Diagnosis:   , secondary to multi-organ failure related to shock, pancreatic adenocarcinoma with carcinomatosis           Procedures:     Procedure(s):  25: Right iliofemoral Thrombectomy, popliteal thrombectomy through separate incision, dorsalis pedis and posterior tibial thrombectomy through separate incisions  Angiogram Right Leg  Fasciotomy lower extremity Right with Dr. Zamudio    25: COLONOSCOPY, WITH COLONIC STENT INSERTION, ENDOSCOPIC ULTRASOUND, ESOPHAGOSCOPY / UPPER GASTROINTESTINAL TRACT (GI) with Dr. Barber          Consultations:   PHARMACY IP CONSULT  SURGICAL ONCOLOGY ADULT IP CONSULT  GI PANCREATICOBILIARY ADULT IP CONSULT  PHYSICAL THERAPY ADULT IP CONSULT  PHARMACY LIAISON FOR MEDICATION COVERAGE CONSULT  CARE MANAGEMENT / SOCIAL WORK IP CONSULT  NURSING TO CONSULT FOR VIRTUAL CARE IP  ONCOLOGY ADULT IP CONSULT  SURGERY GENERAL ADULT IP CONSULT  PALLIATIVE CARE ADULT IP CONSULT  SOCIAL WORK IP CONSULT  INTERVENTIONAL RADIOLOGY ADULT/PEDS IP CONSULT  PHARMACY IP CONSULT  PHARMACY IP CONSULT  NURSING TO CONSULT FOR VASCULAR ACCESS CARE IP CONSULT  OCCUPATIONAL THERAPY ADULT IP CONSULT  NURSING TO CONSULT FOR VASCULAR ACCESS CARE IP CONSULT  NURSING TO CONSULT FOR VASCULAR ACCESS CARE IP CONSULT  UROLOGY IP CONSULT  PHARMACY TO DOSE VANCO  IP PALLIATIVE CARE SOCIAL WORK ADULT CONSULT  NURSING TO CONSULT FOR VASCULAR ACCESS  CARE IP CONSULT          Brief History of Illness:   Gamaliel Johnson is a 69 year old male w/ pmh pertinent for hepatitis C, HTN, tobacco use (chew), EtOH use disorder, possible EtOH withdrawal seizures (on keppra intermittently).  Patient had undergone recent umbilical hernia repair on 5/30/25 at which time was seen to have nodularities concerning for malignancy and from which biopsy returned positive for pancreatic adenocarcinoma. He was admitted on 6/4/2025 for management of abdominal pain and vomiting and was found to have large bowel obstruction secondary to carcinomatosis and new pulmonary embolism.            Hospital Course:   Gamaliel Johnson is a 69 year old male w/ pm pertinent for hepatitis C, HTN, tobacco use (chew), EtOH use disorder, possible EtOH withdrawal seizures (on keppra intermittently). He was admitted on 6/4/2025 for management of abdominal pain and vomiting and was found to have large bowel obstruction and new PE. Patient had undergone recent umbilical hernia repair on 5/30/25 at which time was seen to have nodularities concerning for malignancy and from which biopsy returned positive for adenocarcinoma. On 6/7 he seemed to develop a small bowel obstruction as well. On 6/9 he developed critical ischemia to RLE and was taken urgently to OR by vascular surgery for surgical intervention. Following surgery on 6/9 he required 2 U pRBCS due to bleeding from RLE fasciotomy site. On 6/10 he developed profound hypotension and required emergent intubation for airway protection with admission to the SICU.     Developed profound hypotension and required emergent intubation for airway protection with admission to the SICU. CT abdomen obtained and showed new hemopneumoperitoneum from likely colon perforation. In the ICU required pressors along with progressively increasing oxygen needs on a ventilator. Care conference with family, ICU, and surgical staff 6/10 stating extremely poor prognosis with  recommendation for transition to comfort cares. Patient transitioned to comfort cares by family 6/11. He was pronounced disease at 1:52 pm 6/11/25.           Imaging Studies:     Results for orders placed or performed during the hospital encounter of 06/04/25   XR Surgery HENRI Fluoro Greater Than 5 Min w Stills    Narrative    This exam was marked as non-reportable because it will not be read by a   radiologist or a [place] non-radiologist provider.         CT Abdomen Pelvis w Contrast    Narrative    Exam: Abdomen/pelvis CT with contrast 6/7/2025 3:45 PM      History: s/p colonic stent placement 6/5 with ongoing bloating,  distention, abdominal firmness concerning for obstruction. Lower on  differential would be intraabdominal bleed vs ascites.    Comparison: CT 4/20/2025.     Technique: Helical CT from the lung bases through the symphysis pubis  was performed with intravenous contrast.    Findings:    Liver: No new focal liver mass.    Gallbladder: Intraluminal density greater than simple fluid suggestive  of sludge.    Pancreas: Ill-defined prominence of the pancreatic head and uncinate  process. Hypoenhancing lesion in the hepatic body/tail junction  measuring 1.4 x 1.4 cm. No pancreatic ductal dilation.    Spleen: Normal size.    Adrenals: No nodule.    Kidneys/bladder: Solitary wedge-shaped area of hypoenhancement in the  left kidney midpole. Normal enhancement of the right kidney. No  hydronephrosis, calculus, or renal mass.    Reproductive: No suspicious pelvic mass.    Bowel: Stent in the distal colon in place, where there is wall  thickening due to a mass seen on prior CT. Colonic diverticulosis.  Normal appendix. Normal stomach and duodenum. Enteric contrast is  present in the mid to distal jejunum which is mildly dilated up to 3.1  cm No definite transition point in the small bowel. Cecum is dilated,  measuring up to 10 cm (6/47), previously 10. No pneumatosis or portal  venous  gas.    Mesentery/peritoneum: Multiple mesenteric soft tissue nodularities are  present, overall similar to CT 6/4/2025. No pneumoperitoneum. Moderate  volume simple free fluid in the abdomen and pelvis.    Lymph nodes: No adenopathy.    Vascular: Major intra-abdominal vasculature is patent. Aortoiliac  calcifications. Nonaneurysmal infrarenal aorta. Right common iliac  artery aneurysm, 2.2 cm.    Bones/soft tissues: Spine degenerative changes with multilevel  Schmorl's nodes and facet osteoarthritis. Phlegmon at the midline near  the umbilicus status post hernia repair.    Lung bases: Small bilateral pleural effusions with adjacent  subsegmental atelectasis. Linear scars in the right lung base      Impression    Impression: Compared to prior CT from 6/4/2025, the current scan  shows:  1. Interval placement of colonic stent; apparent decreased caliber of  the stent in mid part, intraluminal density in the stent; similar  persistent air-filled distention of the cecum and the transverse  colon, distal obstruction not excluded. Contrast enema may be  considered for further evaluation, as clinically desired  2. Increased ascites.  3. Small pleural effusions left more than right with associated  bibasilar atelectasis/consolidation  4.focal area of hypoenhancement in the left kidney, consider  correlation for pyelonephritis  5.additional findings, similar to prior, as above, including  omental/peritoneal nodularity, hypodensity in the pancreatic  body/tail, consider pancreatic protocol CT/MRI for further evaluation  as clinically desired.      I have personally reviewed the examination and initial interpretation  and I agree with the findings.    ROSETTA TINAJERO MD         SYSTEM ID:  [id number]   XR Abdomen Port 1 View    Narrative    EXAMINATION: XR ABDOMEN PORT 1 VIEW 6/7/2025 11:12 PM     COMPARISON: CT abdomen/pelvis same day    HISTORY: assess ng place    TECHNIQUE: Portable upright AP view of the  abdomen.    FINDINGS: Enteric tube tip and sidehole project over the stomach.  Mildly dilated loops of small bowel centrally. No pneumatosis or  portal venous gas. Bibasilar streaky opacities.      Impression    IMPRESSION:  1. Enteric tube tip and sidehole project over the stomach.  2. Mildly dilated loops of small, consistent with known small bowel  obstruction.    I have personally reviewed the examination and initial interpretation  and I agree with the findings.    OMAR LENZ MD         SYSTEM ID:  [id number]   XR Surgery HENRI G/T 5 Min Fluoro w Stills    Narrative    This exam was marked as non-reportable because it will not be read by a   radiologist or a [place] non-radiologist provider.         XR Chest Port 1 View    Narrative    Portable chest    INDICATION: Increased oxygen needs    COMPARISON: CT chest 6/4/2025. Most recent plain films on PACS  portable exam 7/5/2020.    Low inspiratory volumes and portable technique. NG/OG tube side hole  and tip in the air distended stomach. Mild vascular crowding related  to low inspiratory volume. Heart size normal. Degenerative spurring  throughout the thoracic spine.      Impression    IMPRESSION: Benign vascular crowding related to low inspiratory volume  from portable technique. Gaseous distention of the stomach with NG/OG  tube tip and sidehole projecting within the stomach.    JUANY REDD MD         SYSTEM ID:  [id number]   XR Chest Port 1 View    Narrative    Portable chest 6/10/2025 at 0904    INDICATION: Check endotracheal tube position    COMPARISON: Earlier today 0721 hours    Low inspiratory volumes presumably from portable technique. Heart size  normal. Endotracheal tube tip is approximately 2.5 cm above the  eleanor. NG/OG tube beyond the inferior margin of the image. Streaky  densities in the lungs are similar which may indicate mild  edema/atelectasis. No ectopic air. Overall, no significant change in  the appearance of the lungs with the  interval change being that of  endotracheal intubation.      Impression    IMPRESSION: Interval endotracheal intubation with grossly appropriate  position of the tubing. Minimal edema/atelectasis in the lungs.    JUANY REDD MD         SYSTEM ID:  [id number]   CTA GI Bleed     Value    Radiologist flags Hemopneumoperitoneum (Urgent)    Narrative    Exam: Computed tomographic angiography of the abdomen and pelvis  without and with contrast, including 3D reformations dated 6/10/2025  11:14 AM    Clinical information:  hypotension, dyspnea, worsening distended  abdomen    Technique: Helical scans through the abdomen and pelvis obtained  before the administration of intravenous contrast media and following  the injection of contrast media  in the arterial phase. Source images  reviewed as well as 3D and multi-planar reconstructions.    Comparison study: CT of 6/7/2025, 6/4/2025    Findings:      VESSELS:     The abdominal aorta is normal in caliber.    The celiac axis, SMA and RISA are patent. The single renal arteries are  patent bilaterally. Right common iliac stent patent. Mixed calcific  atherosclerosis of the common femoral artery bifurcation bilaterally.  Subcutaneous emphysema overlying the right groin likely related to  recent access. Left groin approach central venous catheter tip  terminating in the proximal left common iliac vein and arterial  catheter tip terminating in the mid left external iliac artery.    The splenic vein, portal vein, SMV and renal veins are  patent.  The hepatic veins and IVC are patent.    ABDOMEN/PELVIS:  LOWER THORAX: Small left greater than right loculated pleural  effusions with adjacent compressive atelectasis. Heart size is normal.  No pericardial effusion.    LIVER: No focal hepatic mass.    BILIARY: Normal appearance of the gallbladder. No intra- or  extrahepatic biliary dilation. Vicarious excretion of previously  administered contrast within the gallbladder  lumen.    PANCREAS: No focal pancreatic mass or ductal dilation.    SPLEEN: No splenic mass.    ADRENAL GLANDS: Within normal limits.    URINARY TRACT: Urinary catheter in place. No suspicious renal mass,  renal calculi, or hydronephrosis. No bladder wall thickening.    REPRODUCTIVE ORGANS: No suspicious pelvic mass.    STOMACH: Enteric tube in place. Within normal limits.    BOWEL/PERITONEUM/FLUID: There is new moderate volume hemoperitoneum  with layering mixed density material most pronounced in the left  paracolic gutter and pelvis without active source of arterial  extravasation identified. Small volume free air in the mid mid abdomen  with foci of air scattered throughout the mesentery. Source of  perforation may be in the proximal jejunum with few transmural foci of  air (16/211) versus the lateral end of the colonic stent (16/365) .  Mild small bowel distention likely reactive to ongoing peritoneal  fluid. Appendix is normal.     LYMPH NODES: Scattered mesenteric prominent lymph nodes.    BONES/SOFT TISSUES: No aggressive osseous lesions.      Impression    Impression:   1. New moderate volume hemoperitoneum with regions of layering  heterogenous fluid both in the left paracolic cluster as well as the  mid pelvis suggestive of recent bleeding. No definitive feeding vessel  or focus of acute arterial extravasation.  2. Increased small volume pneumoperitoneum without definitive source  of perforation, though suspicion is in the lateral/proximal edge of  the colonic stent as well as possible few diverticula in the inflamed  proximal jejunum.  3. Small bilateral loculated pleural effusions with adjacent  compressive atelectasis.    [Urgent Result: Hemopneumoperitoneum]    Finding was identified on 6/10/2025 12:04 PM.     Eladio Reyes MD was contacted by Dr. Block at 6/10/2025 12:28 PM and  verbalized understanding of the urgent finding.     I have personally reviewed the examination and initial  interpretation  and I agree with the findings.    VINCENZO VALENCIA DO         SYSTEM ID:  [id number]   XR Chest Port 1 View    Narrative    Portable chest 6/10/2025 at 1718 hours    INDICATION: Tachyarrhythmia. Hypotension. Diaphoresis.    COMPARISON: 904 same day    Bibasilar haziness unchanged. Costophrenic angles are obscured.  Degenerative changes in the thoracic spine. Endotracheal tube tip  approximately 3.8 cm above the eleanor. Esophageal temperature probe is  just above the diaphragmatic hiatus. NG/OG tube beyond the inferior  margin of the image.      Impression    IMPRESSION: Continued edema and pleural effusions in the lung bases  bilaterally.    JUANY REDD MD         SYSTEM ID:  [id number]   Echo Complete     Value    LVEF  60-65%    Narrative    [id number]  [id number]  [id number]  [id number]^HAYLEY^JUANITA^ELISABETH     [facility],[place]  Echocardiography Laboratory  [address] [place] [id number]     Name: BRAYDON LOUIS  MRN: [id number]  : 1955  Study Date: 2025 03:28 PM  Age: 69 yrs  Gender: Male  Patient Location: [facility]  Reason For Study: Pulmonary Embolism  Ordering Physician: JUANITA HARDY  Referring Physician: SIGRID VILA  Performed By: Jama Dominguez     BSA: 1.9 m2  Height: 67 in  Weight: 175 lb  BP: 131/79 mmHg  ______________________________________________________________________________  Procedure  Echocardiogram with two-dimensional, color and spectral Doppler. Contrast  Optison. Optison (NDC #3295-9017-28) given intravenously. Patient was given 6  ml mixture of 3 ml Optison and 6 ml saline. 3 ml wasted.  ______________________________________________________________________________  Interpretation Summary  Global and regional left ventricular function is normal with an EF of 60-65%.  Global right ventricular function is normal.  IVC diameter <2.1 cm collapsing >50% with sniff suggests a normal RA pressure  of 3 mmHg.  No pericardial effusion is  present.  There is no prior study for direct comparison.  ______________________________________________________________________________  Left Ventricle  Global and regional left ventricular function is normal with an EF of 60-65%.  Left ventricular size is normal. Relative wall thickness is increased  consistent with concentric remodeling. No regional wall motion abnormalities  are seen.     Right Ventricle  The right ventricle is normal size. Global right ventricular function is  normal.     Aortic Valve  The aortic valve is tricuspid. Aortic valve sclerosis is present.     Tricuspid Valve  Trace tricuspid insufficiency is present. The peak velocity of the tricuspid  regurgitant jet is not obtainable. Pulmonary artery systolic pressure cannot  be assessed.     Pulmonic Valve  The valve leaflets are not well visualized. Trace pulmonic insufficiency is  present.     Vessels  The aorta root is normal. IVC diameter <2.1 cm collapsing >50% with sniff  suggests a normal RA pressure of 3 mmHg.     Pericardium  No pericardial effusion is present.     Compared to Previous Study  There is no prior study for direct comparison.     ______________________________________________________________________________  MMode/2D Measurements & Calculations  IVSd: 1.2 cm  LVIDd: 3.7 cm  LVIDs: 2.1 cm  LVPWd: 1.2 cm  FS: 43.2 %  LV mass(C)d: 149.1 grams  LV mass(C)dI: 78.0 grams/m2  Ao root diam: 3.7 cm  asc Aorta Diam: 3.8 cm     LVOT diam: 2.0 cm  LVOT area: 3.1 cm2  Ao root diam index Ht(cm/m): 2.2  Ao root diam index BSA (cm/m2): 2.0  Asc Ao diam index BSA (cm/m2): 2.0  Asc Ao diam index Ht(cm/m): 2.3  RWT: 0.64  TAPSE: 2.3 cm     Doppler Measurements & Calculations  Ao V2 max: 127.0 cm/sec  Ao max P.5 mmHg  Ao V2 mean: 91.4 cm/sec  Ao mean P.0 mmHg  Ao V2 VTI: 20.5 cm  MARIANA(I,D): 2.7 cm2  MARIANA(V,D): 2.9 cm2  LV V1 max P.7 mmHg  LV V1 max: 119.0 cm/sec  LV V1 VTI: 17.6 cm  SV(LVOT): 55.2 ml  SI(LVOT): 28.9 ml/m2  PA V2 max:  107.5 cm/sec  PA max P.6 mmHg  PA acc time: 0.07 sec  AV Ry Ratio (DI): 0.94  MARIANA Index (cm2/m2): 1.4  RV S Ry: 18.0 cm/sec     ______________________________________________________________________________  Report approved by: Yolande Oseguera Dr on 2025 04:11 PM                  Medications Prior to Admission:     No medications prior to admission.          Discharge Medications:     Discharge Medication List as of 2025  4:07 PM        CONTINUE these medications which have NOT CHANGED    Details   lisinopril (ZESTRIL) 40 MG tablet Take 40 mg by mouth daily., Historical                  Antibiotics Prescribed at Discharge:   None prescribed           Discharge Instructions and Follow-Up:   No discharge procedures on file.          Discharge Disposition:           Condition at discharge:     Patient was discussed with staff, Dr. Brett Manning, on the day of discharge.    Aaron Arce, MS4  [facility]    Resident/Fellow Attestation   I, Karan Donaldson MD, was present with the medical student who participated in the service and in the documentation of the note.  I have verified the history and personally performed the physical exam and medical decision making.  I agree with the assessment and plan of care as documented in the note.      Karan Donaldson MD  PGY2  Date of Service (when I saw the patient): 25

## 2025-06-15 LAB
BACTERIA SPEC CULT: NO GROWTH
BACTERIA SPEC CULT: NO GROWTH
PATH REPORT.COMMENTS IMP SPEC: NORMAL
PATH REPORT.COMMENTS IMP SPEC: NORMAL
PATH REPORT.FINAL DX SPEC: NORMAL
PATH REPORT.GROSS SPEC: NORMAL
PATH REPORT.MICROSCOPIC SPEC OTHER STN: NORMAL
PATH REPORT.RELEVANT HX SPEC: NORMAL
PHOTO IMAGE: NORMAL

## (undated) DEVICE — GEL ULTRASOUND AQUASONIC 20GM 01-01

## (undated) DEVICE — CATH ANGIO KUMPE SOFT-VU 5FRX65CM CVD H787107327015

## (undated) DEVICE — COTTON BALL 2IN STRL C15000-300

## (undated) DEVICE — DRSG GAUZE 4X4" TRAY

## (undated) DEVICE — ESU GROUND PAD ADULT W/CORD E7507

## (undated) DEVICE — GUIDEWIRE AMPLATZ SUPER STIFF 0.035"X260CM M001465260

## (undated) DEVICE — SPONGE RAY-TEC 4X8" 7318

## (undated) DEVICE — VENTRALEX ST HERNIA PATCH, 8.0 CM (3.2"), CIRCLE
Type: IMPLANTABLE DEVICE | Status: NON-FUNCTIONAL
Brand: VENTRALEX

## (undated) DEVICE — PREP SKIN SCRUB TRAY 4461A

## (undated) DEVICE — SYR 30ML LL W/O NDL 302832

## (undated) DEVICE — PITCHER STERILE 1000ML  SSK9004A

## (undated) DEVICE — LINEN TOWEL PACK X30 5481

## (undated) DEVICE — DRAPE SHEET REV FOLD 3/4 9349

## (undated) DEVICE — INTRO SHEATH 5FRX10CM PINNACLE MARKER RSB512

## (undated) DEVICE — BAG DECANTER STERILE WHITE DYNJDEC09

## (undated) DEVICE — CLIP ENDO HEMO-LOC PURPLE LG 544240

## (undated) DEVICE — ENDO NDL ASPIRATION ULTRASOUND 22GA ACQUIRE M00555640

## (undated) DEVICE — Device

## (undated) DEVICE — DRAPE IOBAN INCISE 23X17" 6650EZ

## (undated) DEVICE — SYR 05ML LL W/O NDL

## (undated) DEVICE — CATH RETRIEVAL BALLOON EXTRACTOR PRO RX-S INJ ABOVE 12-15MM

## (undated) DEVICE — SU PROLENE 5-0 RB-2DA 30" 8710H

## (undated) DEVICE — NDL COUNTER 20CT 31142493

## (undated) DEVICE — CANISTER WOUND VAC W/GEL 1000ML M8275093/5

## (undated) DEVICE — SYR 01ML LL W/O NDL LATEX FREE 309628

## (undated) DEVICE — ENDO PROBE COVER ULTRASOUND BALLOON LATEX  MAJ-249

## (undated) DEVICE — INSERT FOGARTY 33MM TRACTION HYDRAJAW HYDRA33

## (undated) DEVICE — PAD ABD 10X8IN DERMACEA ABS NWVN 4 SL EDG SFT LF STRL 7198D

## (undated) DEVICE — COVER LT HANDLE 2/PK 5160-2FG

## (undated) DEVICE — CLIP HORIZON SM RED WIDE SLOT 001201

## (undated) DEVICE — DRSG AQUACEL EXTRA AG 4X5" 422299

## (undated) DEVICE — INFLATION DEVICE ENCORE  26 PRESSURE GAUGE M001151050

## (undated) DEVICE — CATH FOGARTY EMBOLECTOMY 3FR 80CM LATEX 120803FP

## (undated) DEVICE — SU PROLENE 5-0 RB-1DA 36"  8556H

## (undated) DEVICE — CATH FOGARTY EMBOLECTOMY 5FR 80CM LATEX 120805FP

## (undated) DEVICE — CLIP HORIZON MED BLUE 002200

## (undated) DEVICE — BIN-COVIDIEN MESH BIN BN50

## (undated) DEVICE — KIT ENDO FIRST STEP DISINFECTANT 200ML W/POUCH EP-4

## (undated) DEVICE — WIRE GUIDE 0.035"X450CM JAGWIRE HP STR TIP M00556580

## (undated) DEVICE — PREP CHLORAPREP 26ML TINTED HI-LITE ORANGE 930815

## (undated) DEVICE — LINEN TOWEL PACK X6 WHITE 5487

## (undated) DEVICE — GUIDEWIRE AMPLATZ SUPER STIFF 0.035"X180CM M001465250

## (undated) DEVICE — SU SILK 3-0 TIE 12X30" A304H

## (undated) DEVICE — CATH TRAY FOLEY SURESTEP 16FR W/URNE MTR STLK LATEX A303316A

## (undated) DEVICE — SU SILK 2-0 TIE 12X30" A305H

## (undated) DEVICE — CATH TRAY FOLEY SURESTEP 16FR W/TMP PRB STLK LATEX A319416AM

## (undated) DEVICE — SHEATH PRELUDE SNAP 7FRX13CM PLS-1007

## (undated) DEVICE — SU PLEDGET SOFT TFE 3/8"X3/26"X1/16" PCP40

## (undated) DEVICE — SU PROLENE 6-0 C-1DA 30" 8706H

## (undated) DEVICE — SU ETHIBOND 0 MO-7 CR 8X18" CX41D

## (undated) DEVICE — CATH FOGARTY EMBOLECTOMY 2FR 60CM LATEX 120602FP

## (undated) DEVICE — KIT MICRO-INTRODUCER STIFFEN 4FR 7274V

## (undated) DEVICE — SU SILK 3-0 SH 30" K832H

## (undated) DEVICE — LINEN TOWEL PACK X5 5464

## (undated) DEVICE — DRAPE IOBAN INCISE 36X23" 6651EZ

## (undated) DEVICE — PACK ENDOSCOPY GI CUSTOM UMMC

## (undated) DEVICE — SU DERMABOND ADVANCED .7ML DNX12

## (undated) DEVICE — PACK LAPAROTOMY CUSTOM SBA32LPMBG

## (undated) DEVICE — KIT CONNECTOR FOR OLYMPUS ENDOSCOPES DEFENDO 100310

## (undated) DEVICE — SU PROLENE 7-0 BV-1DA 30" 8703H

## (undated) DEVICE — DECANTER BAG 2002S

## (undated) DEVICE — GOWN SURG XL LVL 3 REINFORCED 9541

## (undated) DEVICE — SOL WATER IRRIG 1000ML BOTTLE 2F7114

## (undated) DEVICE — CUP AND LID 2PK 2OZ STERILE  SSK9006A

## (undated) DEVICE — CATH BALLOON DILATION MUSTANG 10X40X75CM H74939171100470

## (undated) DEVICE — GLIDEWIRE ADVANTAGE 25CM .035 180CM GA3501

## (undated) DEVICE — DRAPE ISOLATION BAG 1003

## (undated) DEVICE — ENDO CAP AND TUBING STERILE FOR ENDOGATOR  100130

## (undated) DEVICE — DRSG XEROFORM 5X9" CUR253590W

## (undated) DEVICE — ADH FLOSEAL W/HUMAN THROMBIN 5ML W/APPLICATOR TIP ADS201844

## (undated) DEVICE — SOL NACL 0.9% INJ 1000ML BAG 07983-09

## (undated) DEVICE — DRSG TEGADERM 4X4 3/4" 1626

## (undated) DEVICE — SU VICRYL 3-0 SH 27" UND J416H

## (undated) DEVICE — SU MONOCRYL 4-0 PS-2 18" UND Y496G

## (undated) DEVICE — CANISTER SUCTION MEDI-VAC GUARDIAN 2000ML 90D 65651-220

## (undated) DEVICE — ENDO FORCEP ENDOJAW BIOPSY 2.8MMX230CM FB-220U

## (undated) DEVICE — TOURNIQUET VASCULAR KIT 7 1/2" 79012

## (undated) DEVICE — LABEL STERILE PREPRINTED FOR OR FRRH01-2M

## (undated) DEVICE — VESSEL LOOPS YELLOW MAXI 31145694

## (undated) DEVICE — PREP POVIDONE-IODINE 7.5% SCRUB 4OZ BOTTLE MDS093945

## (undated) DEVICE — DRSG GAUZE 4X4" 3033

## (undated) DEVICE — PACK BASIN SET UP SUTCNBSBBA

## (undated) DEVICE — GLOVE PROTEXIS BLUE W/NEU-THERA 8.5  2D73EB85

## (undated) DEVICE — ENDO TUBING CO2 SMARTCAP STERILE DISP 100145CO2EXT

## (undated) DEVICE — CATH FOGARTY EMBOLECTOMY 4FR 80CM LATEX 120804FP

## (undated) DEVICE — SPONGE SURGIFOAM 100 1974

## (undated) DEVICE — DRSG TELFA 3X8" 1238

## (undated) DEVICE — GLOVE PROTEXIS MICRO 8.0 LT BLUE 2D73PM80

## (undated) DEVICE — TUBING SUCTION 20FT N620A

## (undated) DEVICE — SU PROLENE 2-0 CT-2 30" 8411H

## (undated) DEVICE — SOL NACL 0.9% IRRIG 1000ML BOTTLE 2F7124

## (undated) DEVICE — DRSG KERLIX 4 1/2"X4YDS ROLL 6715

## (undated) DEVICE — SPONGE LAP 18X18" X8435

## (undated) DEVICE — PACK THORACOTOMY UMMC LF SCV32THF13

## (undated) DEVICE — VESSEL LOOPS RED MINI 24000-01R

## (undated) DEVICE — DRSG STERI STRIP 1/2X4" R1547

## (undated) DEVICE — SU SILK 2-0 SH 30" K833H

## (undated) DEVICE — SU PROLENE 7-0 BV-1DA 24" 8702H

## (undated) DEVICE — SU VICRYL+ 3-0 27IN SH UND VCP416H

## (undated) DEVICE — SU ETHILON 3-0 PS-1 18" 1663H

## (undated) DEVICE — SLEEVE SCD EXPRESS KNEE LENGTH MED 9529

## (undated) DEVICE — SU VICRYL 2-0 SH 27" UND J417H

## (undated) DEVICE — SUCTION MANIFOLD NEPTUNE 2 SYS 4 PORT 0702-020-000

## (undated) DEVICE — SU MONOCRYL 4-0 PS-2 27" UND Y426H

## (undated) DEVICE — DRAPE C-ARM W/STRAPS 42X72" 07-CA104

## (undated) DEVICE — SUTURE BOOTS 051003PBX

## (undated) DEVICE — SU PROLENE 6-0 BV-1DA 18" 8709H

## (undated) RX ORDER — DEXAMETHASONE SODIUM PHOSPHATE 10 MG/ML
INJECTION, SOLUTION INTRAMUSCULAR; INTRAVENOUS
Status: DISPENSED
Start: 2025-05-30

## (undated) RX ORDER — DEXTROSE MONOHYDRATE AND SODIUM CHLORIDE 5; .9 G/100ML; G/100ML
INJECTION, SOLUTION INTRAVENOUS
Status: DISPENSED
Start: 2025-06-05

## (undated) RX ORDER — PROPOFOL 10 MG/ML
INJECTION, EMULSION INTRAVENOUS
Status: DISPENSED
Start: 2025-05-30

## (undated) RX ORDER — FENTANYL CITRATE 50 UG/ML
INJECTION, SOLUTION INTRAMUSCULAR; INTRAVENOUS
Status: DISPENSED
Start: 2025-06-05

## (undated) RX ORDER — HEPARIN SODIUM 1000 [USP'U]/ML
INJECTION, SOLUTION INTRAVENOUS; SUBCUTANEOUS
Status: DISPENSED
Start: 2025-06-09

## (undated) RX ORDER — ONDANSETRON 2 MG/ML
INJECTION INTRAMUSCULAR; INTRAVENOUS
Status: DISPENSED
Start: 2025-06-05

## (undated) RX ORDER — PROPOFOL 10 MG/ML
INJECTION, EMULSION INTRAVENOUS
Status: DISPENSED
Start: 2025-06-05

## (undated) RX ORDER — FENTANYL CITRATE 50 UG/ML
INJECTION, SOLUTION INTRAMUSCULAR; INTRAVENOUS
Status: DISPENSED
Start: 2025-06-09

## (undated) RX ORDER — FENTANYL CITRATE 50 UG/ML
INJECTION, SOLUTION INTRAMUSCULAR; INTRAVENOUS
Status: DISPENSED
Start: 2025-05-30

## (undated) RX ORDER — POTASSIUM CHLORIDE 750 MG/1
TABLET, EXTENDED RELEASE ORAL
Status: DISPENSED
Start: 2025-06-06

## (undated) RX ORDER — ONDANSETRON 2 MG/ML
INJECTION INTRAMUSCULAR; INTRAVENOUS
Status: DISPENSED
Start: 2025-05-30

## (undated) RX ORDER — GLYCOPYRROLATE 0.2 MG/ML
INJECTION, SOLUTION INTRAMUSCULAR; INTRAVENOUS
Status: DISPENSED
Start: 2025-05-30

## (undated) RX ORDER — DEXAMETHASONE SODIUM PHOSPHATE 4 MG/ML
INJECTION, SOLUTION INTRA-ARTICULAR; INTRALESIONAL; INTRAMUSCULAR; INTRAVENOUS; SOFT TISSUE
Status: DISPENSED
Start: 2025-06-05

## (undated) RX ORDER — HYDROMORPHONE HYDROCHLORIDE 1 MG/ML
INJECTION, SOLUTION INTRAMUSCULAR; INTRAVENOUS; SUBCUTANEOUS
Status: DISPENSED
Start: 2025-06-09

## (undated) RX ORDER — IOPAMIDOL 510 MG/ML
INJECTION, SOLUTION INTRAVASCULAR
Status: DISPENSED
Start: 2025-06-09

## (undated) RX ORDER — ACETAMINOPHEN 325 MG/1
TABLET ORAL
Status: DISPENSED
Start: 2025-06-06

## (undated) RX ORDER — FENTANYL CITRATE-0.9 % NACL/PF 10 MCG/ML
PLASTIC BAG, INJECTION (ML) INTRAVENOUS
Status: DISPENSED
Start: 2025-06-05